# Patient Record
Sex: MALE | Race: WHITE | NOT HISPANIC OR LATINO | Employment: UNEMPLOYED | URBAN - NONMETROPOLITAN AREA
[De-identification: names, ages, dates, MRNs, and addresses within clinical notes are randomized per-mention and may not be internally consistent; named-entity substitution may affect disease eponyms.]

---

## 2017-05-19 RX ORDER — CARVEDILOL 3.12 MG/1
3.12 TABLET ORAL DAILY
Qty: 60 TABLET | Refills: 3 | Status: SHIPPED | OUTPATIENT
Start: 2017-05-19 | End: 2017-05-19 | Stop reason: SDUPTHER

## 2017-05-19 RX ORDER — CARVEDILOL 3.12 MG/1
3.12 TABLET ORAL 2 TIMES DAILY WITH MEALS
Qty: 60 TABLET | Refills: 3 | Status: SHIPPED | OUTPATIENT
Start: 2017-05-19 | End: 2017-10-04 | Stop reason: SDUPTHER

## 2017-05-19 RX ORDER — CARVEDILOL 3.12 MG/1
TABLET ORAL
Qty: 60 TABLET | Refills: 1 | OUTPATIENT
Start: 2017-05-19

## 2017-06-14 ENCOUNTER — HOSPITAL ENCOUNTER (OUTPATIENT)
Facility: HOSPITAL | Age: 42
Setting detail: OBSERVATION
Discharge: HOME | End: 2017-06-17
Attending: FAMILY MEDICINE | Admitting: FAMILY MEDICINE

## 2017-06-14 DIAGNOSIS — I25.110 CORONARY ARTERY DISEASE WITH UNSTABLE ANGINA PECTORIS, UNSPECIFIED VESSEL OR LESION TYPE, UNSPECIFIED WHETHER NATIVE OR TRANSPLANTED HEART (HCC): Primary | ICD-10-CM

## 2017-06-15 PROBLEM — I25.10 CAD (CORONARY ARTERY DISEASE): Status: ACTIVE | Noted: 2017-06-15

## 2017-06-15 PROBLEM — R07.9 CHEST PAIN: Status: ACTIVE | Noted: 2017-06-15

## 2017-06-15 LAB
ALBUMIN SERPL-MCNC: 4 G/DL (ref 3.4–4.8)
ALBUMIN/GLOB SERPL: 1.4 G/DL (ref 1.1–1.8)
ALP SERPL-CCNC: 102 U/L (ref 38–126)
ALT SERPL W P-5'-P-CCNC: 42 U/L (ref 21–72)
ANION GAP SERPL CALCULATED.3IONS-SCNC: 10 MMOL/L (ref 5–15)
AST SERPL-CCNC: 20 U/L (ref 17–59)
BILIRUB SERPL-MCNC: 0.4 MG/DL (ref 0.2–1.3)
BUN BLD-MCNC: 13 MG/DL (ref 7–21)
BUN/CREAT SERPL: 12.9 (ref 7–25)
CALCIUM SPEC-SCNC: 9.6 MG/DL (ref 8.4–10.2)
CHLORIDE SERPL-SCNC: 103 MMOL/L (ref 95–110)
CK MB SERPL-CCNC: 0.38 NG/ML (ref 0–5)
CK SERPL-CCNC: 46 U/L (ref 55–170)
CO2 SERPL-SCNC: 25 MMOL/L (ref 22–31)
CREAT BLD-MCNC: 1.01 MG/DL (ref 0.7–1.3)
DEPRECATED RDW RBC AUTO: 44.5 FL (ref 35.1–43.9)
ERYTHROCYTE [DISTWIDTH] IN BLOOD BY AUTOMATED COUNT: 14.2 % (ref 11.5–14.5)
GFR SERPL CREATININE-BSD FRML MDRD: 81 ML/MIN/1.73 (ref 60–147)
GLOBULIN UR ELPH-MCNC: 2.8 GM/DL (ref 2.3–3.5)
GLUCOSE BLD-MCNC: 110 MG/DL (ref 60–100)
HCT VFR BLD AUTO: 47.1 % (ref 39–49)
HGB BLD-MCNC: 16.5 G/DL (ref 13.7–17.3)
INR PPP: 1.02 (ref 0.8–1.2)
MCH RBC QN AUTO: 30.1 PG (ref 26.5–34)
MCHC RBC AUTO-ENTMCNC: 35 G/DL (ref 31.5–36.3)
MCV RBC AUTO: 85.8 FL (ref 80–98)
PLATELET # BLD AUTO: 205 10*3/MM3 (ref 150–450)
PMV BLD AUTO: 10.3 FL (ref 8–12)
POTASSIUM BLD-SCNC: 3.8 MMOL/L (ref 3.5–5.1)
PROT SERPL-MCNC: 6.8 G/DL (ref 6.3–8.6)
PROTHROMBIN TIME: 13.3 SECONDS (ref 11.1–15.3)
RBC # BLD AUTO: 5.49 10*6/MM3 (ref 4.37–5.74)
SODIUM BLD-SCNC: 138 MMOL/L (ref 137–145)
TROPONIN I SERPL-MCNC: 0.01 NG/ML
TROPONIN I SERPL-MCNC: 0.04 NG/ML
TROPONIN I SERPL-MCNC: 0.06 NG/ML
TROPONIN I SERPL-MCNC: 0.07 NG/ML
WBC NRBC COR # BLD: 7.04 10*3/MM3 (ref 3.2–9.8)

## 2017-06-15 PROCEDURE — G0378 HOSPITAL OBSERVATION PER HR: HCPCS

## 2017-06-15 PROCEDURE — 99225 PR SBSQ OBSERVATION CARE/DAY 25 MINUTES: CPT | Performed by: INTERNAL MEDICINE

## 2017-06-15 PROCEDURE — 85027 COMPLETE CBC AUTOMATED: CPT | Performed by: INTERNAL MEDICINE

## 2017-06-15 PROCEDURE — 82550 ASSAY OF CK (CPK): CPT | Performed by: FAMILY MEDICINE

## 2017-06-15 PROCEDURE — 93010 ELECTROCARDIOGRAM REPORT: CPT | Performed by: INTERNAL MEDICINE

## 2017-06-15 PROCEDURE — 80053 COMPREHEN METABOLIC PANEL: CPT | Performed by: INTERNAL MEDICINE

## 2017-06-15 PROCEDURE — 84484 ASSAY OF TROPONIN QUANT: CPT | Performed by: FAMILY MEDICINE

## 2017-06-15 PROCEDURE — 93005 ELECTROCARDIOGRAM TRACING: CPT | Performed by: NURSE PRACTITIONER

## 2017-06-15 PROCEDURE — 85610 PROTHROMBIN TIME: CPT | Performed by: INTERNAL MEDICINE

## 2017-06-15 PROCEDURE — 82553 CREATINE MB FRACTION: CPT | Performed by: FAMILY MEDICINE

## 2017-06-15 PROCEDURE — 84484 ASSAY OF TROPONIN QUANT: CPT | Performed by: NURSE PRACTITIONER

## 2017-06-15 RX ORDER — SODIUM CHLORIDE 0.9 % (FLUSH) 0.9 %
1-10 SYRINGE (ML) INJECTION AS NEEDED
Status: DISCONTINUED | OUTPATIENT
Start: 2017-06-16 | End: 2017-06-16 | Stop reason: HOSPADM

## 2017-06-15 RX ORDER — ONDANSETRON 2 MG/ML
4 INJECTION INTRAMUSCULAR; INTRAVENOUS EVERY 6 HOURS PRN
Status: DISCONTINUED | OUTPATIENT
Start: 2017-06-15 | End: 2017-06-17 | Stop reason: HOSPADM

## 2017-06-15 RX ORDER — PANTOPRAZOLE SODIUM 40 MG/1
40 TABLET, DELAYED RELEASE ORAL DAILY
Status: DISCONTINUED | OUTPATIENT
Start: 2017-06-15 | End: 2017-06-15 | Stop reason: SDUPTHER

## 2017-06-15 RX ORDER — NICOTINE 21 MG/24HR
1 PATCH, TRANSDERMAL 24 HOURS TRANSDERMAL EVERY 24 HOURS
Status: DISCONTINUED | OUTPATIENT
Start: 2017-06-15 | End: 2017-06-17 | Stop reason: HOSPADM

## 2017-06-15 RX ORDER — DOCUSATE SODIUM 100 MG/1
100 CAPSULE, LIQUID FILLED ORAL 2 TIMES DAILY
Status: DISCONTINUED | OUTPATIENT
Start: 2017-06-15 | End: 2017-06-17 | Stop reason: HOSPADM

## 2017-06-15 RX ORDER — ACETAMINOPHEN 325 MG/1
650 TABLET ORAL EVERY 4 HOURS PRN
Status: DISCONTINUED | OUTPATIENT
Start: 2017-06-15 | End: 2017-06-17 | Stop reason: HOSPADM

## 2017-06-15 RX ORDER — PANTOPRAZOLE SODIUM 40 MG/1
40 TABLET, DELAYED RELEASE ORAL EVERY MORNING
Status: DISCONTINUED | OUTPATIENT
Start: 2017-06-15 | End: 2017-06-17 | Stop reason: HOSPADM

## 2017-06-15 RX ORDER — ASPIRIN 81 MG/1
81 TABLET ORAL DAILY
Status: DISCONTINUED | OUTPATIENT
Start: 2017-06-15 | End: 2017-06-15

## 2017-06-15 RX ORDER — ASPIRIN 325 MG
325 TABLET, DELAYED RELEASE (ENTERIC COATED) ORAL DAILY
Status: DISCONTINUED | OUTPATIENT
Start: 2017-06-16 | End: 2017-06-16 | Stop reason: SDUPTHER

## 2017-06-15 RX ORDER — SODIUM CHLORIDE 0.9 % (FLUSH) 0.9 %
1-10 SYRINGE (ML) INJECTION AS NEEDED
Status: DISCONTINUED | OUTPATIENT
Start: 2017-06-15 | End: 2017-06-17 | Stop reason: HOSPADM

## 2017-06-15 RX ORDER — CARVEDILOL 3.12 MG/1
3.12 TABLET ORAL DAILY
Status: DISCONTINUED | OUTPATIENT
Start: 2017-06-15 | End: 2017-06-17 | Stop reason: HOSPADM

## 2017-06-15 RX ORDER — NITROGLYCERIN 0.4 MG/1
0.4 TABLET SUBLINGUAL
Status: DISCONTINUED | OUTPATIENT
Start: 2017-06-15 | End: 2017-06-17 | Stop reason: HOSPADM

## 2017-06-15 RX ORDER — SODIUM CHLORIDE 9 MG/ML
50 INJECTION, SOLUTION INTRAVENOUS CONTINUOUS
Status: DISCONTINUED | OUTPATIENT
Start: 2017-06-16 | End: 2017-06-17 | Stop reason: HOSPADM

## 2017-06-15 RX ADMIN — ASPIRIN 81 MG: 81 TABLET ORAL at 08:45

## 2017-06-15 RX ADMIN — CARVEDILOL 3.12 MG: 3.12 TABLET, FILM COATED ORAL at 08:45

## 2017-06-15 RX ADMIN — NICOTINE 1 PATCH: 21 PATCH TRANSDERMAL at 05:50

## 2017-06-15 RX ADMIN — PANTOPRAZOLE SODIUM 40 MG: 40 TABLET, DELAYED RELEASE ORAL at 05:50

## 2017-06-15 NOTE — H&P
[facility] Medicine Admission      Date of Admission: 6/14/2017      Primary Care Physician: Grabiel Olvera MD      Chief Complaint: Chest pain     HPI: Patient presents to ED with complaints of chest pain that started today. Patient states that he experiences chest pain constantly in the central portion of the chest. Worsens with exertion. States when he has CP, he describes it as a sharp sensation with radiation to the upper extremities and left side of his neck. Associated with SOA and diaphoresis. SL Nitro has some relief of pain with symptoms. He presented to ED at [facility] ED. ECG showed no new ST-T wave changes and cardiac enzymes negative with the 1st set. Due to having no cardiology at their facility he was transferred to [facility]. States having a history of CAD with stent placement in 2010.     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    Past Surgical History:  has a past surgical history that includes Cardiac catheterization; Esophagogastroduodenoscopy; Colonoscopy; and Esophagogastroduodenoscopy.    Family History: family history includes Hypertension in his other.    Social History:  reports that he has been smoking Cigarettes.  He has been smoking about 2.00 packs per day. He has never used smokeless tobacco. He reports that he does not drink  alcohol or use illicit drugs.    Allergies: No Known Allergies    Medications: Scheduled Meds:  aspirin 81 mg Oral Daily   carvedilol 3.125 mg Oral Daily   docusate sodium 100 mg Oral BID   nicotine 1 patch Transdermal Q24H   pantoprazole 40 mg Oral QAM     Continuous Infusions:   PRN Meds:.•  acetaminophen  •  nitroglycerin  •  ondansetron  •  sodium chloride    Review of Systems:  Review of Systems   Constitutional: Positive for diaphoresis and fatigue. Negative for appetite change, chills and fever.   HENT: Negative for congestion, ear pain, rhinorrhea, sneezing and sore throat.    Respiratory: Positive for shortness of breath. Negative for cough.    Cardiovascular: Positive for chest pain. Negative for palpitations and leg swelling.   Gastrointestinal: Negative for diarrhea, nausea and vomiting.   Endocrine: Negative for polyphagia and polyuria.   Musculoskeletal: Negative for back pain and neck pain.   Skin: Negative for color change and rash.   Neurological: Negative for dizziness, syncope and weakness.   Psychiatric/Behavioral: Negative for agitation, confusion and dysphoric mood.      Otherwise complete ROS is negative except as mentioned above.    Physical Exam:   Heart Rate:  [86] 86  Resp:  [20] 20  BP: (130)/(92) 130/92  Physical Exam   Constitutional: He is oriented to person, place, and time. He appears well-developed and well-nourished.   Cardiovascular: Normal rate, regular rhythm and normal heart sounds.  Exam reveals no gallop and no friction rub.    No murmur heard.  Pulmonary/Chest: Effort normal and breath sounds normal. No respiratory distress. He has no wheezes. He has no rales.   Abdominal: Soft. Bowel sounds are normal. There is no tenderness.   Musculoskeletal: Normal range of motion. He exhibits no edema.   Neurological: He is alert and oriented to person, place, and time.   Skin: Skin is warm and dry.   Psychiatric: He has a normal mood and affect. His behavior is normal.   Vitals  reviewed.        Results Reviewed:  I have personally reviewed current lab, radiology, and data and agree with results.  Lab Results (last 24 hours)     ** No results found for the last 24 hours. **        Imaging Results (last 24 hours)     ** No results found for the last 24 hours. **                          Assessment/Plan:   1) Chest pain R/O ACS - Trend 3 sets of cardiac enzymes, monitor on telemetry. ASA, SL Nitro as needed, continue Coreg. Cardiology consultation.  2) Tobacco dependence syndrome - Will provide patient with a nicotine patch  3) DVT PPx - SCDs/TEDs           Jalen Mae MD  06/15/17  1:07 AM

## 2017-06-15 NOTE — PLAN OF CARE
Problem: Patient Care Overview (Adult)  Goal: Plan of Care Review  Outcome: Ongoing (interventions implemented as appropriate)    06/15/17 1872   Coping/Psychosocial Response Interventions   Plan Of Care Reviewed With patient   Patient Care Overview   Progress improving   Outcome Evaluation   Outcome Summary/Follow up Plan pt c/o slight chest discomfort today, troponins drawn and has gradually increased. FLORI Sandoval is aware. consult in to cardiology. v/s stable. will continue to monitor.        Goal: Adult Individualization and Mutuality  Outcome: Ongoing (interventions implemented as appropriate)  Goal: Discharge Needs Assessment  Outcome: Ongoing (interventions implemented as appropriate)    Problem: Acute Coronary Syndrome (ACS) (Adult)  Goal: Signs and Symptoms of Listed Potential Problems Will be Absent or Manageable (Acute Coronary Syndrome)  Outcome: Ongoing (interventions implemented as appropriate)

## 2017-06-15 NOTE — PLAN OF CARE
Problem: Patient Care Overview (Adult)  Goal: Plan of Care Review  Outcome: Ongoing (interventions implemented as appropriate)    06/15/17 0527   Coping/Psychosocial Response Interventions   Plan Of Care Reviewed With patient   Patient Care Overview   Progress improving   Outcome Evaluation   Outcome Summary/Follow up Plan No c/o chest pain, lab work WNL, VSS stable.        Goal: Adult Individualization and Mutuality  Outcome: Ongoing (interventions implemented as appropriate)  Goal: Discharge Needs Assessment  Outcome: Ongoing (interventions implemented as appropriate)    Problem: Acute Coronary Syndrome (ACS) (Adult)  Goal: Signs and Symptoms of Listed Potential Problems Will be Absent or Manageable (Acute Coronary Syndrome)  Outcome: Ongoing (interventions implemented as appropriate)

## 2017-06-15 NOTE — PROGRESS NOTES
[facility] Medicine Services  INPATIENT PROGRESS NOTE    Length of Stay: 0  Date of Admission: 6/14/2017  Primary Care Physician: Grabiel Olvera MD    Subjective   Chief Complaint: Chest pain  HPI:  41 year old male with history of CAD s/p LAD stent in 2010 presents with symptoms of similar quality and quantity as that time.  Troponin is trace elevated.  He is currently pain free.      Review of Systems   Constitutional: Negative for chills and fever.   Respiratory: Negative for shortness of breath and wheezing.    Cardiovascular: Negative for chest pain (currenlty chest pain free) and palpitations.   Gastrointestinal: Negative for abdominal pain, nausea and vomiting.      All pertinent negatives and positives are as above. All other systems have been reviewed and are negative unless otherwise stated.     Objective    Temp:  [97 °F (36.1 °C)-98.8 °F (37.1 °C)] 97.7 °F (36.5 °C)  Heart Rate:  [65-86] 70  Resp:  [20] 20  BP: (110-130)/(62-92) 127/70    Physical Exam   Constitutional: He is oriented to person, place, and time. He appears well-developed and well-nourished.   HENT:   Head: Normocephalic and atraumatic.   Cardiovascular: Normal rate, regular rhythm, normal heart sounds and intact distal pulses.    Pulmonary/Chest: Effort normal and breath sounds normal. No respiratory distress. He has no wheezes.   Abdominal: Soft. Bowel sounds are normal. He exhibits no distension. There is no tenderness.   Musculoskeletal: Normal range of motion. He exhibits no edema.   Neurological: He is alert and oriented to person, place, and time.   Skin: Skin is warm and dry. No erythema.   Vitals reviewed.    Results Review:  I have reviewed the labs, radiology results, and diagnostic studies.    Laboratory Data:         Invalid input(s): LABALBU, PROT  CrCl cannot be calculated (Patient's most recent sCr result is older than the maximum 30 days allowed.).                    Culture  Data:   No results found for: BLOODCX  No results found for: URINECX  No results found for: RESPCX  No results found for: WOUNDCX  No results found for: STOOLCX  No components found for: BODYFLD    Radiology Data:   Imaging Results (last 24 hours)     ** No results found for the last 24 hours. **          I have reviewed the patient current medications.     Assessment/Plan     Hospital Problem List     Chest pain    CAD (coronary artery disease)          Plan:   Trend troponin  Aspirin  Beta blocker  Cardiology consult        This document has been electronically signed by FLORI Dickinson on Char 15, 2017 3:59 PM    I personally evaluated and examined the patient in conjunction with PRIMO Reyes and agree with the assessment, treatment plan, and disposition of the patient as recorded.   Lungs are clear

## 2017-06-16 LAB
ANION GAP SERPL CALCULATED.3IONS-SCNC: 8 MMOL/L (ref 5–15)
BASOPHILS # BLD AUTO: 0.07 10*3/MM3 (ref 0–0.2)
BASOPHILS NFR BLD AUTO: 1.1 % (ref 0–2)
BUN BLD-MCNC: 14 MG/DL (ref 7–21)
BUN/CREAT SERPL: 13.3 (ref 7–25)
CALCIUM SPEC-SCNC: 8.9 MG/DL (ref 8.4–10.2)
CHLORIDE SERPL-SCNC: 105 MMOL/L (ref 95–110)
CO2 SERPL-SCNC: 26 MMOL/L (ref 22–31)
CREAT BLD-MCNC: 1.05 MG/DL (ref 0.7–1.3)
DEPRECATED RDW RBC AUTO: 45.5 FL (ref 35.1–43.9)
EOSINOPHIL # BLD AUTO: 0.27 10*3/MM3 (ref 0–0.7)
EOSINOPHIL NFR BLD AUTO: 4.1 % (ref 0–7)
ERYTHROCYTE [DISTWIDTH] IN BLOOD BY AUTOMATED COUNT: 14.4 % (ref 11.5–14.5)
GFR SERPL CREATININE-BSD FRML MDRD: 77 ML/MIN/1.73 (ref 60–147)
GLUCOSE BLD-MCNC: 98 MG/DL (ref 60–100)
HCT VFR BLD AUTO: 47.9 % (ref 39–49)
HGB BLD-MCNC: 16.4 G/DL (ref 13.7–17.3)
IMM GRANULOCYTES # BLD: 0.02 10*3/MM3 (ref 0–0.02)
IMM GRANULOCYTES NFR BLD: 0.3 % (ref 0–0.5)
LYMPHOCYTES # BLD AUTO: 2.94 10*3/MM3 (ref 0.6–4.2)
LYMPHOCYTES NFR BLD AUTO: 44.4 % (ref 10–50)
MCH RBC QN AUTO: 29.5 PG (ref 26.5–34)
MCHC RBC AUTO-ENTMCNC: 34.2 G/DL (ref 31.5–36.3)
MCV RBC AUTO: 86.3 FL (ref 80–98)
MONOCYTES # BLD AUTO: 0.54 10*3/MM3 (ref 0–0.9)
MONOCYTES NFR BLD AUTO: 8.2 % (ref 0–12)
NEUTROPHILS # BLD AUTO: 2.78 10*3/MM3 (ref 2–8.6)
NEUTROPHILS NFR BLD AUTO: 41.9 % (ref 37–80)
PLATELET # BLD AUTO: 188 10*3/MM3 (ref 150–450)
PMV BLD AUTO: 10.5 FL (ref 8–12)
POTASSIUM BLD-SCNC: 4.1 MMOL/L (ref 3.5–5.1)
RBC # BLD AUTO: 5.55 10*6/MM3 (ref 4.37–5.74)
SODIUM BLD-SCNC: 139 MMOL/L (ref 137–145)
WBC NRBC COR # BLD: 6.62 10*3/MM3 (ref 3.2–9.8)

## 2017-06-16 PROCEDURE — 0 IOPAMIDOL PER 1 ML: Performed by: INTERNAL MEDICINE

## 2017-06-16 PROCEDURE — C1887 CATHETER, GUIDING: HCPCS | Performed by: INTERNAL MEDICINE

## 2017-06-16 PROCEDURE — 25010000002 PROTAMINE SULFATE PER 10 MG: Performed by: INTERNAL MEDICINE

## 2017-06-16 PROCEDURE — C1769 GUIDE WIRE: HCPCS | Performed by: INTERNAL MEDICINE

## 2017-06-16 PROCEDURE — 99152 MOD SED SAME PHYS/QHP 5/>YRS: CPT | Performed by: INTERNAL MEDICINE

## 2017-06-16 PROCEDURE — 80048 BASIC METABOLIC PNL TOTAL CA: CPT | Performed by: FAMILY MEDICINE

## 2017-06-16 PROCEDURE — 99153 MOD SED SAME PHYS/QHP EA: CPT | Performed by: INTERNAL MEDICINE

## 2017-06-16 PROCEDURE — 25010000002 FENTANYL CITRATE (PF) 100 MCG/2ML SOLUTION: Performed by: INTERNAL MEDICINE

## 2017-06-16 PROCEDURE — C9600 PERC DRUG-EL COR STENT SING: HCPCS | Performed by: INTERNAL MEDICINE

## 2017-06-16 PROCEDURE — G0378 HOSPITAL OBSERVATION PER HR: HCPCS

## 2017-06-16 PROCEDURE — C1725 CATH, TRANSLUMIN NON-LASER: HCPCS | Performed by: INTERNAL MEDICINE

## 2017-06-16 PROCEDURE — C1874 STENT, COATED/COV W/DEL SYS: HCPCS | Performed by: INTERNAL MEDICINE

## 2017-06-16 PROCEDURE — 93454 CORONARY ARTERY ANGIO S&I: CPT | Performed by: INTERNAL MEDICINE

## 2017-06-16 PROCEDURE — 94799 UNLISTED PULMONARY SVC/PX: CPT

## 2017-06-16 PROCEDURE — 25010000002 HEPARIN (PORCINE) PER 1000 UNITS: Performed by: INTERNAL MEDICINE

## 2017-06-16 PROCEDURE — 92928 PRQ TCAT PLMT NTRAC ST 1 LES: CPT | Performed by: INTERNAL MEDICINE

## 2017-06-16 PROCEDURE — 25010000002 MIDAZOLAM PER 1 MG: Performed by: INTERNAL MEDICINE

## 2017-06-16 PROCEDURE — C1894 INTRO/SHEATH, NON-LASER: HCPCS | Performed by: INTERNAL MEDICINE

## 2017-06-16 PROCEDURE — 85025 COMPLETE CBC W/AUTO DIFF WBC: CPT | Performed by: FAMILY MEDICINE

## 2017-06-16 DEVICE — XIENCE ALPINE EVEROLIMUS ELUTING CORONARY STENT SYSTEM 3.50 MM X 23 MM / RAPID-EXCHANGE
Type: IMPLANTABLE DEVICE | Status: FUNCTIONAL
Brand: XIENCE ALPINE

## 2017-06-16 RX ORDER — MIDAZOLAM HYDROCHLORIDE 1 MG/ML
INJECTION INTRAMUSCULAR; INTRAVENOUS AS NEEDED
Status: DISCONTINUED | OUTPATIENT
Start: 2017-06-16 | End: 2017-06-16 | Stop reason: HOSPADM

## 2017-06-16 RX ORDER — HEPARIN SODIUM 1000 [USP'U]/ML
INJECTION, SOLUTION INTRAVENOUS; SUBCUTANEOUS AS NEEDED
Status: DISCONTINUED | OUTPATIENT
Start: 2017-06-16 | End: 2017-06-16 | Stop reason: HOSPADM

## 2017-06-16 RX ORDER — NITROGLYCERIN 5 MG/ML
INJECTION, SOLUTION INTRAVENOUS AS NEEDED
Status: DISCONTINUED | OUTPATIENT
Start: 2017-06-16 | End: 2017-06-16 | Stop reason: HOSPADM

## 2017-06-16 RX ORDER — LIDOCAINE HYDROCHLORIDE 20 MG/ML
INJECTION, SOLUTION INFILTRATION; PERINEURAL AS NEEDED
Status: DISCONTINUED | OUTPATIENT
Start: 2017-06-16 | End: 2017-06-16 | Stop reason: HOSPADM

## 2017-06-16 RX ORDER — PROTAMINE SULFATE 10 MG/ML
INJECTION, SOLUTION INTRAVENOUS AS NEEDED
Status: DISCONTINUED | OUTPATIENT
Start: 2017-06-16 | End: 2017-06-16 | Stop reason: HOSPADM

## 2017-06-16 RX ORDER — HYDROCODONE BITARTRATE AND ACETAMINOPHEN 5; 325 MG/1; MG/1
1 TABLET ORAL EVERY 6 HOURS PRN
Status: DISCONTINUED | OUTPATIENT
Start: 2017-06-16 | End: 2017-06-17 | Stop reason: HOSPADM

## 2017-06-16 RX ORDER — SODIUM CHLORIDE 9 MG/ML
125 INJECTION, SOLUTION INTRAVENOUS CONTINUOUS
Status: DISCONTINUED | OUTPATIENT
Start: 2017-06-16 | End: 2017-06-17 | Stop reason: HOSPADM

## 2017-06-16 RX ORDER — ASPIRIN 81 MG/1
81 TABLET, CHEWABLE ORAL DAILY
Status: DISCONTINUED | OUTPATIENT
Start: 2017-06-17 | End: 2017-06-17 | Stop reason: HOSPADM

## 2017-06-16 RX ORDER — FENTANYL CITRATE 50 UG/ML
INJECTION, SOLUTION INTRAMUSCULAR; INTRAVENOUS AS NEEDED
Status: DISCONTINUED | OUTPATIENT
Start: 2017-06-16 | End: 2017-06-16 | Stop reason: HOSPADM

## 2017-06-16 RX ADMIN — CARVEDILOL 3.12 MG: 3.12 TABLET, FILM COATED ORAL at 09:45

## 2017-06-16 RX ADMIN — ASPIRIN 325 MG: 325 TABLET ORAL at 09:45

## 2017-06-16 RX ADMIN — Medication 3 ML: at 09:42

## 2017-06-16 RX ADMIN — SODIUM CHLORIDE 50 ML/HR: 9 INJECTION, SOLUTION INTRAVENOUS at 09:42

## 2017-06-16 RX ADMIN — ACETAMINOPHEN 650 MG: 325 TABLET ORAL at 17:35

## 2017-06-16 RX ADMIN — SODIUM CHLORIDE 125 ML/HR: 9 INJECTION, SOLUTION INTRAVENOUS at 19:54

## 2017-06-16 NOTE — PLAN OF CARE
Problem: Patient Care Overview (Adult)  Goal: Plan of Care Review  Outcome: Ongoing (interventions implemented as appropriate)    06/15/17 1552 06/15/17 2100 06/16/17 0416   Coping/Psychosocial Response Interventions   Plan Of Care Reviewed With --  patient;spouse --    Patient Care Overview   Progress improving --  --    Outcome Evaluation   Outcome Summary/Follow up Plan --  --  No complaints of chest pain this shift, VSS. Dr. Tineo talked to pt about doing heart cath this afternoon. Cath to be done this afternoon.        Goal: Adult Individualization and Mutuality  Outcome: Ongoing (interventions implemented as appropriate)  Goal: Discharge Needs Assessment  Outcome: Ongoing (interventions implemented as appropriate)    Problem: Acute Coronary Syndrome (ACS) (Adult)  Goal: Signs and Symptoms of Listed Potential Problems Will be Absent or Manageable (Acute Coronary Syndrome)  Outcome: Ongoing (interventions implemented as appropriate)

## 2017-06-16 NOTE — CONSULTS
Patient Name: Kristian Chandra  Age/Sex: 41 y.o. male  : 1975  MRN: [id number]    Date of Hospital Visit: 2017  Encounter Provider: Melissa Tineo MD  Referring Provider: Jalen Mae MD         Subjective:       Chief Complaint: Unstable angina    History of Present Illness:  Kristian Chandra is a 41 y.o. male presents to the hospital with chief complaint of chest discomfort.  Patient stated that he started to experience chest discomfort in his anterior precordium radiating to his left shoulder and left upper extremity.  He stated that his arm gets heavy and numb.  He also gets extreme fatigue and lack of energy with minor exertion.  He said his symptoms would last for approximately 15 minutes of resting.  Patient takes nitroglycerin which helped with his chest discomfort.  Patient stated that his symptoms were very similar to what he had when he had his myocardial infarction in .    Last Echo: May 15, 2012 left ventricular systolic function was within normal limits with estimated left ventricular systolic ejection fraction of 65%.  Mild concentric left ventricular hypertrophy was also noted.  Diastolic function was within normal limits.    Last Cath: 2011 99% stenosis in the mid LAD was discovered.  2.5 x 13 mm Cypher stent was initially deployed at 12 theo.  Subsequently due to wasting the stent 2.5 x 10 mm balloon was used for postdilatation.  According to the report the patient had no residual disease.      Past Medical History:  Past Medical History:   Diagnosis Date   • Abnormal liver function tests     elevated   • Acute bronchitis    • Asthma, allergic    • Blastomycosis    • Chronic obstructive lung disease    • Chronic persistent hepatitis    • Coronary arteriosclerosis     post stent    • Diarrhea    • Dyspnea on exertion    • Epigastric pain    • Essential hypertension    • Generalized abdominal pain    • GERD (gastroesophageal reflux disease)     with  esophagitis   • H/O echocardiogram     Normal left ventricular systolic function with EF of 65%. Concentric left ventricular hypertrophy. 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 Postdilated using a noncompliant balloon [id number] balloon 04/17/2011       • COLONOSCOPY      External and internal hemorrhoids.Medium sized lipoma at the ileocecal valve. Biopsied.Several biopsies obtained in the entire colon and in the terminal ileum. 07/08/2016       • ENDOSCOPY      Internal & external hemorrhoids found. 12/21/2011     • ENDOSCOPY      Mildly severe esophagitis. Gastritis. Normal duodenum. Biopsied. Several biopsies obtained in the lower third of the esophagus.Several biopsies obtained in the gastric antrum. 07/08/2016           Home Medications:    Prescriptions Prior to Admission   Medication Sig Dispense Refill Last Dose   • aspirin 81 MG EC tablet Take 81 mg by mouth Daily.   6/15/2017 at Unknown time   • carvedilol (COREG) 3.125 MG tablet Take 1 tablet by mouth 2 (Two) Times a Day With Meals. (Patient taking differently: Take 3.125 mg by mouth Daily.) 60 tablet 3 6/15/2017 at Unknown time   • esomeprazole (nexIUM) 20 MG capsule Take 20 mg by mouth Every Morning Before Breakfast.   6/15/2017   • Loratadine 10 MG capsule Take  by mouth daily.   6/15/2017 at Unknown time   • pantoprazole (PROTONIX) 40 MG EC tablet Take 1 tablet by mouth daily. 30 tablet 3        Allergies:  No Known Allergies    Past Social History:  Social History     Social History   • Marital  status:      Spouse name: N/A   • Number of children: N/A   • Years of education: N/A     Social History Main Topics   • Smoking status: Current Every Day Smoker     Packs/day: 2.00     Types: Cigarettes   • Smokeless tobacco: Never Used   • Alcohol use No   • Drug use: No   • Sexual activity: Not on file     Other Topics Concern   • Not on file     Social History Narrative       Past Family History:      Review of Systems:   Constitution: No chills, no rigors, no unexplained weight loss or weight gain  Eyes:  No diplopia, no blurred vision, no loss of vision, conjunctiva is pink and sclera is anicteric  ENT:  No tinnitus, no otorrhea, no epistaxis, no sore throat   Respiratory: No cough, no hemoptysis  Cardiovascular: Chest pain, dyspnea on minimal exertion.  Fatigue on minimal exertion  Gastrointestinal: No nausea, no vomiting, no hematemesis, no diarrhea or constipation, no melena  Genitourinary: No frequency of dysuria no hematuria  Integument: positive for  No pruritis and  no skin rash  Hematologic / Lymphatic: No excessive bleeding, easy bruising, fatigue, lymphadenopathy and petechiae  Musculoskeletal: No joint pain, joint stiffness, joint swelling, muscle pain, muscle weakness and neck pain  Neurological: No dizziness, headaches, light headedness, seizures and vertigo  Behavioral/Psych: No depression, homicidal ideations and suicidal ideations  Endocrine: No frequent urination and nocturia, temperature intolerance, weight gain, unintended and weight loss, unintended      Objective:     Objective:  Vital Signs (last 24 hours)       06/14 0700  -  06/15 0659 06/15 0700  -  06/15 2038   Most Recent    Temp (°F) 97.3 -  98.8    97 -  97.7     97.6 (36.4)    Heart Rate 80 -  86    65 -  70     67    Resp   20    18 -  20     18    /62 -  130/92    110/68 -  127/70     126/79    SpO2 (%) 96 -  97    96 -  98     97          There is no height or weight on file to calculate BMI.  Weight change:            Physical Exam:   General Appearance:    Alert, oriented, cooperative, in no acute distress   Head:    Normocephalic, atraumatic, without obvious abnormality   Eyes:            Lids and lashes normal, conjunctivae and sclerae normal, no   icterus, no pallor   Ears:    Ears appear intact with no abnormalities noted   Throat:   Mucous membranes pink and moist   Neck:   Supple, trachea midline, no carotid bruit, no JVD   Lungs:     Clear to auscultation, respirations regular, even and                   Unlabored. No wheezes, rales, rhonchi    Heart:    Regular rhythm and normal rate, normal S1 and S2, no            murmur, no gallop, no rub, no click   Abdomen:     Normal bowel sounds, no masses, liver and spleen nonpalpable, soft, non-tender, non-distended, no guarding, no rebound tenderness   Genitalia:    Deferred   Extremities:   Moves all extremities well, no edema, no cyanosis, no              Redness, no clubbing   Pulses:   Pulses palpable and equal bilaterally   Skin:   No bleeding, bruising or rash   Neurologic:   Alert and oriented to person, place, and time. No focal neurological deficits       Lab Review:           Invalid input(s): LABALBU, PROT    Results from last 7 days  Lab Units 06/15/17  1558 06/15/17  1247 06/15/17  0644 06/15/17  0136   CK TOTAL U/L  --   --   --  46*   TROPONIN I ng/mL 0.063* 0.066* 0.035* 0.012                               Invalid input(s):  T4,  FREET4    EKG:   ECG/EMG Results (last 24 hours)     Procedure Component Value Units Date/Time    SCANNED EKG [[id number]] Resulted:  06/14/17      Updated:  06/15/17 0951    SCANNED EKG [[id number]] Resulted:  06/14/17      Updated:  06/15/17 0958    ECG 12 Lead [[id number]] Collected:  06/15/17 1012     Updated:  06/15/17 1507    Narrative:       Test Reason : chest pain  Blood Pressure : **/** mmHG  Vent. Rate : 067 BPM     Atrial Rate : 067 BPM     P-R Int : 136 ms          QRS Dur : 100 ms      QT Int : 408 ms       P-R-T  Axes : 043 028 042 degrees     QTc Int : 431 ms    Normal sinus rhythm  Normal ECG  When compared with ECG of 30-MAR-2015 09:01,  No significant change was found  Confirmed by ALDAIR HOFFMAN, LIBORIO ([id number]),  MIKE GONZALEZ ([id number]) on  6/15/2017 3:07:38 PM    Referred By:             Confirmed By:LIBORIO QUINTEROS MD    SCANNED EKG [[id number]] Resulted:  06/14/17      Updated:  06/15/17 2029          Imaging:  Imaging Results (last 24 hours)     ** No results found for the last 24 hours. **          I personally viewed and interpreted the patient's EKG/Telemetry data.    Assessment:     Active Problems:    1.  Unstable angina with mildly elevated cardiac enzymes    2.  Coronary artery disease    3.  Chronic tobacco abuse      Plan:   · I have counseled the patient for over 10 minutes regarding tobacco smoking cessation and risk factor modification.  Patient has verbalized understanding.  She has not given any comments in regards to his plans are.  · I have advised patient to consider diagnostic coronary angiogram.  Risks and benefits and all potential complications of diagnostic angiogram was discussed with the patient.  Patient has verbalized understanding.  All his questions were answered.   has given me permission to put him on the schedule for diagnostic coronary angiogram and possible PCI.  We have also discussed potential need for open heart surgery depending on severity and location of the lesions.  · We will continue with current medications.  We will keep patient nothing by mouth after breakfast.              This document has been electronically signed by Melissa Tineo MD on Char 15, 2017 8:51 PM     Melissa Tineo MD  06/15/17  8:38 PM

## 2017-06-16 NOTE — PROGRESS NOTES
[facility] Medicine Services  INPATIENT PROGRESS NOTE    Length of Stay: 0  Date of Admission: 6/14/2017  Primary Care Physician: Grabiel Olvera MD    Subjective   Chief Complaint: Chest pain  HPI:  41 year old male with history of CAD s/p LAD stent in 2010 presents with symptoms of similar quality and quantity as that time.  He was evaluated by cardiology and was recommended coronary angiogram.    Review of Systems   Constitutional: Negative for chills and fever.   Respiratory: Negative for shortness of breath and wheezing.    Cardiovascular: Negative for chest pain (currenlty chest pain free) and palpitations.   Gastrointestinal: Negative for abdominal pain, nausea and vomiting.      All pertinent negatives and positives are as above. All other systems have been reviewed and are negative unless otherwise stated.     Objective    Temp:  [97.4 °F (36.3 °C)-98.4 °F (36.9 °C)] 97.8 °F (36.6 °C)  Heart Rate:  [53-75] 62  Resp:  [16-18] 18  BP: ()/(56-87) 118/78    Physical Exam   Constitutional: He is oriented to person, place, and time. He appears well-developed and well-nourished.   HENT:   Head: Normocephalic and atraumatic.   Cardiovascular: Normal rate, regular rhythm, normal heart sounds and intact distal pulses.    Pulmonary/Chest: Effort normal and breath sounds normal. No respiratory distress. He has no wheezes.   Abdominal: Soft. Bowel sounds are normal. He exhibits no distension. There is no tenderness.   Musculoskeletal: Normal range of motion. He exhibits no edema.   Neurological: He is alert and oriented to person, place, and time.   Skin: Skin is warm and dry. No erythema.   Vitals reviewed.    Results Review:  I have reviewed the labs, radiology results, and diagnostic studies.    Laboratory Data:     Results from last 7 days  Lab Units 06/16/17  0535 06/15/17  2150   SODIUM mmol/L 139 138   POTASSIUM mmol/L 4.1 3.8   CHLORIDE mmol/L 105 103   TOTAL CO2  mmol/L 26.0 25.0   BUN mg/dL 14 13   CREATININE mg/dL 1.05 1.01   GLUCOSE mg/dL 98 110*   CALCIUM mg/dL 8.9 9.6   BILIRUBIN mg/dL  --  0.4   ALK PHOS U/L  --  102   ALT (SGPT) U/L  --  42   AST (SGOT) U/L  --  20   ANION GAP mmol/L 8.0 10.0     CrCl cannot be calculated (Unknown ideal weight.).            Results from last 7 days  Lab Units 06/16/17  0535 06/15/17  2150   WBC 10*3/mm3 6.62 7.04   HEMOGLOBIN g/dL 16.4 16.5   HEMATOCRIT % 47.9 47.1   PLATELETS 10*3/mm3 188 205       Results from last 7 days  Lab Units 06/15/17  2150   INR  1.02       Culture Data:   No results found for: BLOODCX  No results found for: URINECX  No results found for: RESPCX  No results found for: WOUNDCX  No results found for: STOOLCX  No components found for: BODYFLD    Radiology Data:   Imaging Results (last 24 hours)     ** No results found for the last 24 hours. **          I have reviewed the patient current medications.     Assessment/Plan     Hospital Problem List     Chest pain    CAD (coronary artery disease)          Plan:   Heart cath today  ASA  Coreg  Smoking cessation discussed        This document has been electronically signed by FLORI Dickinson on June 16, 2017 3:17 PM

## 2017-06-16 NOTE — PLAN OF CARE
Problem: Patient Care Overview (Adult)  Goal: Plan of Care Review  Outcome: Ongoing (interventions implemented as appropriate)    06/16/17 3045   Coping/Psychosocial Response Interventions   Plan Of Care Reviewed With patient   Patient Care Overview   Progress improving   Outcome Evaluation   Outcome Summary/Follow up Plan Pt returned from cath wtih TR band. TR band deflated. 1 stent to LAD.        Goal: Adult Individualization and Mutuality  Outcome: Ongoing (interventions implemented as appropriate)    Problem: Acute Coronary Syndrome (ACS) (Adult)  Goal: Signs and Symptoms of Listed Potential Problems Will be Absent or Manageable (Acute Coronary Syndrome)  Outcome: Ongoing (interventions implemented as appropriate)

## 2017-06-17 VITALS
SYSTOLIC BLOOD PRESSURE: 154 MMHG | HEART RATE: 60 BPM | WEIGHT: 225.1 LBS | RESPIRATION RATE: 18 BRPM | HEIGHT: 70 IN | BODY MASS INDEX: 32.22 KG/M2 | DIASTOLIC BLOOD PRESSURE: 75 MMHG | TEMPERATURE: 97.7 F | OXYGEN SATURATION: 97 %

## 2017-06-17 PROBLEM — I20.0 UNSTABLE ANGINA (HCC): Status: ACTIVE | Noted: 2017-06-17

## 2017-06-17 PROBLEM — F17.200 TOBACCO DEPENDENCE: Status: ACTIVE | Noted: 2017-06-17

## 2017-06-17 PROBLEM — E78.5 DYSLIPIDEMIA: Status: ACTIVE | Noted: 2017-06-17

## 2017-06-17 LAB
ANION GAP SERPL CALCULATED.3IONS-SCNC: 9 MMOL/L (ref 5–15)
ARTICHOKE IGE QN: 113 MG/DL (ref 1–129)
BASOPHILS # BLD AUTO: 0.05 10*3/MM3 (ref 0–0.2)
BASOPHILS NFR BLD AUTO: 0.7 % (ref 0–2)
BUN BLD-MCNC: 14 MG/DL (ref 7–21)
BUN/CREAT SERPL: 15.1 (ref 7–25)
CALCIUM SPEC-SCNC: 8.1 MG/DL (ref 8.4–10.2)
CHLORIDE SERPL-SCNC: 110 MMOL/L (ref 95–110)
CHOLEST SERPL-MCNC: 152 MG/DL (ref 0–199)
CO2 SERPL-SCNC: 21 MMOL/L (ref 22–31)
CREAT BLD-MCNC: 0.93 MG/DL (ref 0.7–1.3)
DEPRECATED RDW RBC AUTO: 44.3 FL (ref 35.1–43.9)
EOSINOPHIL # BLD AUTO: 0.22 10*3/MM3 (ref 0–0.7)
EOSINOPHIL NFR BLD AUTO: 2.9 % (ref 0–7)
ERYTHROCYTE [DISTWIDTH] IN BLOOD BY AUTOMATED COUNT: 14.2 % (ref 11.5–14.5)
GFR SERPL CREATININE-BSD FRML MDRD: 89 ML/MIN/1.73 (ref 63–147)
GLUCOSE BLD-MCNC: 91 MG/DL (ref 60–100)
HBA1C MFR BLD: 5.73 % (ref 4–5.6)
HCT VFR BLD AUTO: 43.8 % (ref 39–49)
HDLC SERPL-MCNC: 23 MG/DL (ref 60–200)
HGB BLD-MCNC: 15.2 G/DL (ref 13.7–17.3)
IMM GRANULOCYTES # BLD: 0.02 10*3/MM3 (ref 0–0.02)
IMM GRANULOCYTES NFR BLD: 0.3 % (ref 0–0.5)
LDLC/HDLC SERPL: 3.34 {RATIO} (ref 0–3.55)
LYMPHOCYTES # BLD AUTO: 2.79 10*3/MM3 (ref 0.6–4.2)
LYMPHOCYTES NFR BLD AUTO: 37.2 % (ref 10–50)
MCH RBC QN AUTO: 29.6 PG (ref 26.5–34)
MCHC RBC AUTO-ENTMCNC: 34.7 G/DL (ref 31.5–36.3)
MCV RBC AUTO: 85.2 FL (ref 80–98)
MONOCYTES # BLD AUTO: 0.47 10*3/MM3 (ref 0–0.9)
MONOCYTES NFR BLD AUTO: 6.3 % (ref 0–12)
NEUTROPHILS # BLD AUTO: 3.96 10*3/MM3 (ref 2–8.6)
NEUTROPHILS NFR BLD AUTO: 52.6 % (ref 37–80)
PLATELET # BLD AUTO: 183 10*3/MM3 (ref 150–450)
PMV BLD AUTO: 10.1 FL (ref 8–12)
POTASSIUM BLD-SCNC: 3.8 MMOL/L (ref 3.5–5.1)
RBC # BLD AUTO: 5.14 10*6/MM3 (ref 4.37–5.74)
SODIUM BLD-SCNC: 140 MMOL/L (ref 137–145)
TRIGL SERPL-MCNC: 261 MG/DL (ref 20–199)
WBC NRBC COR # BLD: 7.51 10*3/MM3 (ref 3.2–9.8)

## 2017-06-17 PROCEDURE — G0378 HOSPITAL OBSERVATION PER HR: HCPCS

## 2017-06-17 PROCEDURE — 94799 UNLISTED PULMONARY SVC/PX: CPT

## 2017-06-17 PROCEDURE — 83036 HEMOGLOBIN GLYCOSYLATED A1C: CPT | Performed by: INTERNAL MEDICINE

## 2017-06-17 PROCEDURE — 80048 BASIC METABOLIC PNL TOTAL CA: CPT | Performed by: FAMILY MEDICINE

## 2017-06-17 PROCEDURE — 99225 PR SBSQ OBSERVATION CARE/DAY 25 MINUTES: CPT | Performed by: INTERNAL MEDICINE

## 2017-06-17 PROCEDURE — 80061 LIPID PANEL: CPT | Performed by: INTERNAL MEDICINE

## 2017-06-17 PROCEDURE — 85025 COMPLETE CBC W/AUTO DIFF WBC: CPT | Performed by: FAMILY MEDICINE

## 2017-06-17 RX ORDER — ROSUVASTATIN CALCIUM 20 MG/1
20 TABLET, COATED ORAL NIGHTLY
Qty: 30 TABLET | Refills: 0 | Status: SHIPPED | OUTPATIENT
Start: 2017-06-17 | End: 2017-06-17 | Stop reason: HOSPADM

## 2017-06-17 RX ORDER — ROSUVASTATIN CALCIUM 20 MG/1
20 TABLET, COATED ORAL NIGHTLY
Status: DISCONTINUED | OUTPATIENT
Start: 2017-06-17 | End: 2017-06-17 | Stop reason: HOSPADM

## 2017-06-17 RX ORDER — PRAVASTATIN SODIUM 40 MG
40 TABLET ORAL DAILY
Qty: 30 TABLET | Refills: 0 | Status: SHIPPED | OUTPATIENT
Start: 2017-06-17 | End: 2017-07-17 | Stop reason: SDUPTHER

## 2017-06-17 RX ORDER — NITROGLYCERIN 0.4 MG/1
0.4 TABLET SUBLINGUAL
Qty: 30 TABLET | Refills: 0 | Status: SHIPPED | OUTPATIENT
Start: 2017-06-17

## 2017-06-17 RX ADMIN — PANTOPRAZOLE SODIUM 40 MG: 40 TABLET, DELAYED RELEASE ORAL at 09:00

## 2017-06-17 RX ADMIN — SODIUM CHLORIDE 125 ML/HR: 9 INJECTION, SOLUTION INTRAVENOUS at 05:39

## 2017-06-17 RX ADMIN — CARVEDILOL 3.12 MG: 3.12 TABLET, FILM COATED ORAL at 09:04

## 2017-06-17 RX ADMIN — HYDROCODONE BITARTRATE AND ACETAMINOPHEN 1 TABLET: 5; 325 TABLET ORAL at 09:01

## 2017-06-17 RX ADMIN — ASPIRIN 81 MG 81 MG: 81 TABLET ORAL at 09:01

## 2017-06-17 RX ADMIN — TICAGRELOR 90 MG: 90 TABLET ORAL at 09:07

## 2017-06-17 NOTE — DISCHARGE SUMMARY
[facility] Medicine Services  DISCHARGE SUMMARY       Date of Admission: 6/14/2017  Date of Discharge:  6/17/2017  Primary Care Physician: Grabiel Olvera MD    Presenting Problem/History of Present Illness:  Chest pain [R07.9]  Coronary artery disease with unstable angina pectoris, unspecified vessel or lesion type, unspecified whether native or transplanted heart [I25.110]     Final Discharge Diagnoses:  Principal Problem:    Unstable angina  Active Problems:    Chest pain    CAD (coronary artery disease)    Tobacco dependence    Dyslipidemia      Consults:   Consults     Date and Time Order Name Status Description    6/15/2017 0601 Inpatient Consult to Cardiology Completed           Procedures Performed: Procedure(s):  Left Heart Cath  Percutaneous Coronary Intervention                Pertinent Test Results:     Findings:     LM: The left main artery originates from the left coronary sinus. It's a decent caliber vessel. It divides into LAD and left circumflex. The left main is free of disease.     LAD: The left anterior descending coronary artery is medium caliber vessel proximally and distally it tapers off on becomes small caliber vessel. The previously placed stent in the mid LAD is patent. Proximal LAD has 90% stenosis and distal to the previously placed stent the distal LAD has 50% stenosis.      LCX: The left circumflex is a codominant vessel. It is medium to large in caliber. It courses along the anterior AV groove. It gives rise to a decent caliber first obtuse marginal branch. Gives rise to a posterolateral branch and the PDA branch. The first obtuse marginal branch has intermediate between 50 and 70% stenosis.      RCA: The right coronary artery originates from the right coronary sinus. It courses along the posterior AV groove. It gives rise to a PDA distally. Proximal LAD has 50-70% stenosis.     PDA: The PDA is free of obstructive stenosis.      Impression:  "Single vessel obstructive coronary artery disease in the proximal LAD.     Plan: Proceed with angioplasty and stent placement to the proximal LAD.       HPI/Hospital Course:  The patient is a 41 y.o. male who presented to [facility] with chest pain.  He has a history of CAD with LAD stent in 2010.  He presented with sharp sensations in his chest radiating to the upper extremities and neck.  It was worse with exertion.  He was placed on observation after initial negative work-up in the ED.  Serial cardiac enzymes trended upward and cardiology was consulted.  The patient was recommended coronary angiogram which revealed a proximal LAD lesion at 90% which underwent stent placement.  He recovered post-procedure in the CCU without incident.  Smoking cessation was discussed.  He is recommended DAPT and statin therapy.  He is stable and will be discharged to home.      Condition on Discharge:  Stable    Physical Exam on Discharge:  /75  Pulse 60  Temp 97.7 °F (36.5 °C) (Oral)   Resp 18  Ht 70\" (177.8 cm)  Wt 225 lb 1.6 oz (102 kg)  SpO2 97%  BMI 32.3 kg/m2  Physical Exam   Constitutional: He is oriented to person, place, and time. He appears well-developed and well-nourished.   HENT:   Head: Normocephalic and atraumatic.   Neck: Neck supple.   Cardiovascular: Normal rate, regular rhythm and intact distal pulses.    Pulmonary/Chest: Effort normal and breath sounds normal. No respiratory distress. He has no wheezes.   Abdominal: Soft. Bowel sounds are normal. He exhibits no distension. There is no tenderness.   Musculoskeletal: Normal range of motion. He exhibits no edema or deformity.   Neurological: He is alert and oriented to person, place, and time.   Skin: Skin is warm and dry. No erythema.   Vitals reviewed.    Discharge Disposition:  Home or Self Care    Discharge Medications:   Kristian Chandra   Home Medication Instructions ИВАН:[id number]    Printed on:06/17/17 0628   Medication " Information                      aspirin 81 MG EC tablet  Take 81 mg by mouth Daily.             carvedilol (COREG) 3.125 MG tablet  Take 1 tablet by mouth 2 (Two) Times a Day With Meals.             nitroglycerin (NITROSTAT) 0.4 MG SL tablet  Place 1 tablet under the tongue Every 5 (Five) Minutes As Needed for Chest Pain. Take no more than 3 doses in 15 minutes.             pantoprazole (PROTONIX) 40 MG EC tablet  Take 1 tablet by mouth daily.             pravastatin (PRAVACHOL) 40 MG tablet  Take 1 tablet by mouth Daily.             ticagrelor (BRILINTA) 90 MG tablet tablet  Take 1 tablet by mouth 2 (Two) Times a Day.                 Discharge Diet:   Diet Instructions     Diet: Cardiac; Thin Liquids, No Restrictions       Discharge Diet:  Cardiac   Fluid Consistency:  Thin Liquids, No Restrictions                 Activity at Discharge:   Activity Instructions     Activity as Tolerated               Additional Activity Instructions:        Take it easy for a few days. No lifting with right hand for a few days.                Follow-up Appointments:   1. PCP 1 week  2. Dr. Tineo 4 weeks     FLORI Dickinson  06/17/17  1:45 PM    Time:  Discharge planning and teaching took greater than 30 minutes.

## 2017-06-17 NOTE — PLAN OF CARE
Problem: Acute Coronary Syndrome (ACS) (Adult)  Goal: Signs and Symptoms of Listed Potential Problems Will be Absent or Manageable (Acute Coronary Syndrome)  Outcome: Outcome(s) achieved Date Met:  06/17/17

## 2017-06-17 NOTE — PROGRESS NOTES
TODAY'S DATE   06/17/17      SUBJECTIVE: Patient was seen and examined today.  He has no chest pain or chest discomfort.  He has no shortness of breath orthopnea or PND.  Patient stated that he was able to walk with no difficulty this morning.  Patient's right radial artery site has healed very well there was no complications.  His pulse is still patent.    PHYSICAL EXAM:    General Appearance:    Alert, cooperative, in no acute distress   Head:    Normocephalic, without obvious abnormality, atraumatic   Eyes:            Lids and lashes normal, conjunctivae and sclerae normal, no   icterus, no pallor, corneas clear, PERRLA   Ears:    Ears appear intact with no abnormalities noted   Throat:   No oral lesions, no thrush, oral mucosa moist   Neck:   No adenopathy, supple, trachea midline, no thyromegaly, no   carotid bruit, no JVD   Back:     No kyphosis present, no scoliosis present, no skin lesions,      erythema or scars, no tenderness to percussion or                   palpation,   range of motion normal   Lungs:     Clear to auscultation,respirations regular, even and                  unlabored    Heart:    Regular rhythm and normal rate, normal S1 and S2, no            murmur, no gallop, no rub, no click   Chest Wall:    No abnormalities observed   Abdomen:     Normal bowel sounds, no masses, no organomegaly, soft        non-tender, non-distended, no guarding, no rebound                tenderness   Rectal:     Deferred   Extremities:   Moves all extremities well, no edema, no cyanosis, no             redness   Pulses:   Pulses palpable and equal bilaterally   Skin:   No bleeding, bruising or rash   Lymph nodes:   No palpable adenopathy   Neurologic:   Cranial nerves 2 - 12 grossly intact, sensation intact, DTR       present and equal bilaterally           Vital Signs (last 24 hours)       06/16 0700  -  06/17 0659 06/17 0700  -  06/17 0935   Most Recent    Temp (°F) 97.4 -  98.2      97.7     97.7 (36.5)    Heart  Rate 52 -  76    53 -  74     60    Resp 14 -  18      18     18    /73 -  151/79    144/87 -  158/74     154/75    SpO2 (%) 92 -  98    97 -  99     99            LABS:      Results from last 7 days  Lab Units 06/17/17 0314 06/16/17  0535 06/15/17  2150   WBC 10*3/mm3 7.51 6.62 7.04   HEMOGLOBIN g/dL 15.2 16.4 16.5   PLATELETS 10*3/mm3 183 188 205       Results from last 7 days  Lab Units 06/17/17  0314 06/16/17  0535 06/15/17  2150   SODIUM mmol/L 140 139 138   POTASSIUM mmol/L 3.8 4.1 3.8   CHLORIDE mmol/L 110 105 103   TOTAL CO2 mmol/L 21.0* 26.0 25.0   BUN mg/dL 14 14 13   CREATININE mg/dL 0.93 1.05 1.01   CALCIUM mg/dL 8.1* 8.9 9.6   GLUCOSE mg/dL 91 98 110*       Results from last 7 days  Lab Units 06/15/17  2150   BILIRUBIN mg/dL 0.4   ALK PHOS U/L 102   AST (SGOT) U/L 20   ALT (SGPT) U/L 42           Results from last 7 days  Lab Units 06/15/17  2150   INR  1.02       Results from last 7 days  Lab Units 06/15/17  1558 06/15/17  1247 06/15/17  0644 06/15/17  0136   CK TOTAL U/L  --   --   --  46*   TROPONIN I ng/mL 0.063* 0.066* 0.035* 0.012   CKMB ng/mL  --   --   --  0.38       Hemoglobin A1C   Date Value Ref Range Status   06/17/2017 5.73 (H) 4 - 5.6 % Final     No results found for: POCGLU      aspirin 81 mg Oral Daily   carvedilol 3.125 mg Oral Daily   docusate sodium 100 mg Oral BID   nicotine 1 patch Transdermal Q24H   pantoprazole 40 mg Oral QAM   rosuvastatin 20 mg Oral Nightly   ticagrelor 90 mg Oral BID       sodium chloride 50 mL/hr Last Rate: 50 mL/hr (06/16/17 0942)   sodium chloride 125 mL/hr Last Rate: 125 mL/hr (06/17/17 0539)       TELEMETRY:  Normal sinus rhythm  ASSESSMENT:  1.  Non-ST segment elevation myocardial infarction  2.  Coronary artery disease  3.  Hyperlipidemia  4.  Chronic tobacco abuse    PLAN:  · Patient has been counseled regarding tobacco smoking cessation on multiple occasions.  · Patient has been counseled and advised regarding compliance with aspirin and  Brilinta  · Patient has been counseled regarding the importance of taking Crestor  · Patient has verbalized understanding.          This document has been electronically signed by Melissa Tineo MD on June 17, 2017 9:38 AM     Melissa Tineo MD [facility], [facility]

## 2017-06-17 NOTE — PLAN OF CARE
Problem: Patient Care Overview (Adult)  Goal: Plan of Care Review  Outcome: Ongoing (interventions implemented as appropriate)    06/17/17 0550   Coping/Psychosocial Response Interventions   Plan Of Care Reviewed With patient;significant other   Patient Care Overview   Progress improving   Outcome Evaluation   Outcome Summary/Follow up Plan Pt rested throughout the night. Wrist site monitored for bleeding (no complications noted); no problems identified at this time       Goal: Adult Individualization and Mutuality  Outcome: Ongoing (interventions implemented as appropriate)  Goal: Discharge Needs Assessment  Outcome: Ongoing (interventions implemented as appropriate)    Problem: Acute Coronary Syndrome (ACS) (Adult)  Goal: Signs and Symptoms of Listed Potential Problems Will be Absent or Manageable (Acute Coronary Syndrome)  Outcome: Ongoing (interventions implemented as appropriate)

## 2017-07-06 ENCOUNTER — TELEPHONE (OUTPATIENT)
Dept: CARDIOLOGY | Facility: CLINIC | Age: 42
End: 2017-07-06

## 2017-07-17 ENCOUNTER — OFFICE VISIT (OUTPATIENT)
Dept: CARDIOLOGY | Facility: CLINIC | Age: 42
End: 2017-07-17

## 2017-07-17 VITALS
HEART RATE: 78 BPM | WEIGHT: 240 LBS | HEIGHT: 70 IN | OXYGEN SATURATION: 96 % | DIASTOLIC BLOOD PRESSURE: 60 MMHG | SYSTOLIC BLOOD PRESSURE: 110 MMHG | BODY MASS INDEX: 34.36 KG/M2

## 2017-07-17 DIAGNOSIS — I25.10 CORONARY ARTERY DISEASE INVOLVING NATIVE CORONARY ARTERY OF NATIVE HEART WITHOUT ANGINA PECTORIS: Primary | ICD-10-CM

## 2017-07-17 DIAGNOSIS — Z71.6 TOBACCO ABUSE COUNSELING: ICD-10-CM

## 2017-07-17 DIAGNOSIS — E78.00 PURE HYPERCHOLESTEROLEMIA: ICD-10-CM

## 2017-07-17 PROCEDURE — 99214 OFFICE O/P EST MOD 30 MIN: CPT | Performed by: INTERNAL MEDICINE

## 2017-07-17 RX ORDER — PRAVASTATIN SODIUM 40 MG
40 TABLET ORAL DAILY
Qty: 30 TABLET | Refills: 6 | Status: SHIPPED | OUTPATIENT
Start: 2017-07-17 | End: 2018-02-20 | Stop reason: SDUPTHER

## 2017-07-19 RX ORDER — PANTOPRAZOLE SODIUM 40 MG/1
40 TABLET, DELAYED RELEASE ORAL DAILY
Qty: 30 TABLET | Refills: 11 | Status: SHIPPED | OUTPATIENT
Start: 2017-07-19 | End: 2017-07-24 | Stop reason: CLARIF

## 2017-07-24 RX ORDER — OMEPRAZOLE 40 MG/1
40 CAPSULE, DELAYED RELEASE ORAL DAILY
Qty: 30 CAPSULE | Refills: 11 | Status: SHIPPED | OUTPATIENT
Start: 2017-07-24 | End: 2017-07-26

## 2017-07-26 RX ORDER — RANITIDINE 300 MG/1
300 CAPSULE ORAL EVERY EVENING
Qty: 30 CAPSULE | Refills: 11 | Status: SHIPPED | OUTPATIENT
Start: 2017-07-26 | End: 2017-12-05

## 2017-07-28 ENCOUNTER — TELEPHONE (OUTPATIENT)
Dept: GASTROENTEROLOGY | Facility: CLINIC | Age: 42
End: 2017-07-28

## 2017-07-28 NOTE — TELEPHONE ENCOUNTER
Pt rx for Brilinta 90 mg BID required prior authorization.  Authorization is obtained and is valid 07/26/17-07/26/18.  [facility] is notified at [phone].  Attempted to contact pt at [phone].  Left voicemail with information and number to office in case he has any questions.

## 2017-07-31 NOTE — PROGRESS NOTES
Chief complaint : Coronary artery disease    History of Present Illness this is a very pleasant 51-year-old gentleman who comes today for a follow-up visit.  Patient was recently in the hospital with acute myocardial infarction.  Patient underwent successful PCI with a stent placement to his proximal LAD with a 3.5 x 23 mm xience alpine stent.  Patient stated that he has normal chest pain or chest discomfort.  He has no shortness of breath orthopnea or PND.         Review of Systems   Constitution: Negative. Negative for decreased appetite, diaphoresis, weakness, night sweats, weight gain and weight loss.   HENT: Negative for headaches, hearing loss, nosebleeds and sore throat.    Eyes: Negative.  Negative for blurred vision and photophobia.   Cardiovascular: Negative for chest pain, claudication, dyspnea on exertion, irregular heartbeat, leg swelling, palpitations, paroxysmal nocturnal dyspnea and syncope.   Respiratory: Negative for cough, hemoptysis, shortness of breath and wheezing.    Endocrine: Negative for cold intolerance, heat intolerance, polydipsia, polyphagia and polyuria.   Hematologic/Lymphatic: Negative.    Skin: Negative for color change, dry skin, flushing, itching and rash.   Musculoskeletal: Negative.  Negative for muscle cramps, muscle weakness and myalgias.   Gastrointestinal: Negative for abdominal pain, change in bowel habit, diarrhea, hematemesis, melena, nausea and vomiting.   Genitourinary: Negative for dysuria, frequency and hematuria.   Neurological: Negative for dizziness, focal weakness, light-headedness, loss of balance, numbness and seizures.   Psychiatric/Behavioral: Negative.  Negative for substance abuse, suicidal ideas and thoughts of violence.   Allergic/Immunologic: Negative.        Past Medical History:   Diagnosis Date   • Abnormal liver function tests     elevated   • Acute bronchitis    • Asthma, allergic    • Blastomycosis    • Chronic obstructive lung disease    • Chronic  "persistent hepatitis    • Coronary arteriosclerosis     post stent 2010   • Diarrhea    • Dyspnea on exertion    • Epigastric pain    • Essential hypertension    • Generalized abdominal pain    • GERD (gastroesophageal reflux disease)     with esophagitis   • H/O echocardiogram     Normal left ventricular systolic function with EF of 65%. Concentric left ventricular hypertrophy. 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     reports that he has been smoking Cigarettes.  He has been smoking about 2.00 packs per day. He has never used smokeless tobacco. He reports that he does not drink alcohol or use illicit drugs.    Objective     Vital Signs   7/17/17 6/14/17 8/24/16   /60 154/75 111/73   Heart Rate 78 60 95   Resp  18    Temp  97.7 °F (36.5 °C)    Temp src  Oral    SpO2 96 % 97 %    Weight 240 lb (109 kg) 225 lb 1.6 oz (102 kg) 232 lb 3.2 oz (105 kg)   Height 70\" (177.8 cm) 70\" (177.8 cm) 70\" (177.8 cm)   BMI (Calculated) 34.4 32.3 33.3   BSA (Calculated - sq m) 2.26 sq meters 2.2 sq meters 2.22 sq meters         Physical Exam     Physical Exam:   General Appearance:    Alert, oriented, cooperative, in no acute distress   Head:    Normocephalic, atraumatic, without obvious abnormality   Eyes:            Lids and lashes normal, conjunctivae and sclerae normal, no   icterus, no pallor   Ears:    Ears appear intact with no abnormalities noted   Throat:   Mucous membranes pink and moist   Neck:   Supple, trachea midline, no carotid bruit, no JVD   Lungs:     Clear to auscultation, respirations " regular, even and                   Unlabored. No wheezes, rales, rhonchi    Heart:    Regular rhythm and normal rate, normal S1 and S2, no            murmur, no gallop, no rub, no click   Abdomen:     Normal bowel sounds, no masses, liver and spleen nonpalpable, soft, non-tender, non-distended, no guarding, no rebound tenderness   Genitalia:    Deferred   Extremities:   Moves all extremities well, no edema, no cyanosis, no              Redness, no clubbing   Pulses:   Pulses palpable and equal bilaterally   Skin:   No bleeding, bruising or rash   Neurologic:   Alert and oriented to person, place, and time. No focal neurological deficits         Physical Exam  Procedures    Assessment/Plan     Patient Active Problem List   Diagnosis   • Chest pain   • CAD (coronary artery disease)   • Unstable angina   • Tobacco dependence   • Dyslipidemia     1. Coronary artery disease involving native coronary artery of native heart without angina pectoris  March 26, 2013:  2.5 x 15 mm Xience Xpedition stent to the right coronary artery  June 16, 2017:     3.5 x 23 mm Xience Alpine stent to the proximal LAD    Plan:  · Continue with dual antiplatelet treatment  · Continue with risk factor modification  · Patient is advised to continue with cardiac rehabilitation    2. Pure hypercholesterolemia  Component      Latest Ref Rng & Units 1/8/2014 11/17/2015 6/17/2017   Total Cholesterol      0 - 199 mg/dL 153 169 152   Triglycerides      20 - 199 mg/dL 368 (H) 127 261 (H)   HDL Cholesterol      60 - 200 mg/dL 28 (L)  23 (L)   LDL Cholesterol      1 - 129 mg/dL 51  113   LDL/HDL Ratio      0.00 - 3.55  4.25 (H) 3.34     We will continue with diet modification  Continue with current dose of pravastatin  If the LDL is not at goal we may switch patient to Crestor or Lipitor    3. Tobacco abuse counseling  Patient has quit smoking cigarettes for the last one month.  I have encouraged patient to continue with tobacco smoking cessation      I  discussed the patients findings and my recommendations with patient.          This document has been electronically signed by Melissa Tineo MD on July 31, 2017 11:18 AM     Melissa Tineo MD  07/31/17  11:15 AM

## 2017-10-04 RX ORDER — CARVEDILOL 3.12 MG/1
TABLET ORAL
Qty: 60 TABLET | Refills: 2 | Status: SHIPPED | OUTPATIENT
Start: 2017-10-04 | End: 2017-12-30 | Stop reason: SDUPTHER

## 2017-12-05 ENCOUNTER — OFFICE VISIT (OUTPATIENT)
Dept: CARDIOLOGY | Facility: CLINIC | Age: 42
End: 2017-12-05

## 2017-12-05 VITALS
BODY MASS INDEX: 35.07 KG/M2 | HEIGHT: 70 IN | HEART RATE: 60 BPM | WEIGHT: 245 LBS | SYSTOLIC BLOOD PRESSURE: 120 MMHG | DIASTOLIC BLOOD PRESSURE: 60 MMHG

## 2017-12-05 DIAGNOSIS — F17.200 TOBACCO DEPENDENCE: ICD-10-CM

## 2017-12-05 DIAGNOSIS — I25.10 CORONARY ARTERY DISEASE INVOLVING NATIVE CORONARY ARTERY OF NATIVE HEART WITHOUT ANGINA PECTORIS: Primary | ICD-10-CM

## 2017-12-05 DIAGNOSIS — E78.5 DYSLIPIDEMIA: ICD-10-CM

## 2017-12-05 DIAGNOSIS — R07.2 PRECORDIAL PAIN: ICD-10-CM

## 2017-12-05 PROBLEM — I20.0 UNSTABLE ANGINA (HCC): Status: RESOLVED | Noted: 2017-06-17 | Resolved: 2017-12-05

## 2017-12-05 PROCEDURE — 99213 OFFICE O/P EST LOW 20 MIN: CPT | Performed by: INTERNAL MEDICINE

## 2017-12-05 NOTE — PROGRESS NOTES
Kristian Chandra  42 y.o. male    12/05/2017  1. Coronary artery disease involving native coronary artery of native heart without angina pectoris    2. Tobacco dependence    3. Dyslipidemia    4. Precordial pain        History of Present Illness:Routine follow-up    41 years old patient known to Dr. Tineo with history of for CAD status post PTA stent with in stent in-stent done in June 2017.  Patient denies orthopnea, PND, nauseous, vomiting.  Polydipsia polyuria.  Denies any fever cough which it.  Present dysuria hematuria.  Denies any intermittent claudication.  Fortunately he stopped smoking.  History of gastroesophageal reflux disease for which patient is taking H2 blocker        Cath 6/2017   LAD: The left anterior descending coronary artery is medium caliber vessel proximally and distally it tapers off on becomes small caliber vessel.  The previously placed stent in the mid LAD is patent.  Proximal LAD has 90% stenosis and distal to the previously placed stent the distal LAD has 50% stenosis.Status post PTA stent of LAD      LCX: The left circumflex is a codominant vessel.  It is medium to large in caliber.  It courses along the anterior AV groove.  It gives rise to a decent caliber first obtuse marginal branch.  Gives rise to a posterolateral branch and the PDA branch.  The first obtuse marginal branch has intermediate between 50 and 70% stenosis  SUBJECTIVE    No Known Allergies      Past Medical History:   Diagnosis Date   • Abnormal liver function tests     elevated   • Acute bronchitis    • Asthma, allergic    • Blastomycosis    • Chronic obstructive lung disease    • Chronic persistent hepatitis    • Coronary arteriosclerosis     post stent 2010   • Diarrhea    • Dyspnea on exertion    • Epigastric pain    • Essential hypertension    • Generalized abdominal pain    • GERD (gastroesophageal reflux disease)     with esophagitis   • H/O echocardiogram     Normal left ventricular systolic function with  EF of 65%. Concentric left ventricular hypertrophy. 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 Postdilated using a noncompliant balloon [id number] balloon 04/17/2011       • CARDIAC CATHETERIZATION N/A 6/16/2017    Procedure: Left Heart Cath;  Surgeon: Melissa Tineo MD;  Location: [facility] CATH INVASIVE LOCATION;  Service:    • COLONOSCOPY      External and internal hemorrhoids.Medium sized lipoma at the ileocecal valve. Biopsied.Several biopsies obtained in the entire colon and in the terminal ileum. 07/08/2016       • ENDOSCOPY      Internal & external hemorrhoids found. 12/21/2011     • ENDOSCOPY      Mildly severe esophagitis. Gastritis. Normal duodenum. Biopsied. Several biopsies obtained in the lower third of the esophagus.Several biopsies obtained in the gastric antrum. 07/08/2016       • [facility] RT/LT HEART CATHETERS N/A 6/16/2017    Procedure: Percutaneous Coronary Intervention;  Surgeon: Melissa Tineo MD;  Location: [facility] CATH INVASIVE LOCATION;  Service: Cardiovascular         Family History   Problem Relation Age of Onset   • Hypertension Other          Social History     Social History   • Marital status:      Spouse name: N/A   • Number of children: N/A   • Years of education: N/A     Occupational History   • Not on file.     Social History Main Topics   • Smoking status: Current Every Day Smoker     Packs/day: 2.00     Types: Cigarettes   • Smokeless tobacco: Never Used   • Alcohol use No   • Drug use: No   • Sexual activity: Not on file     Other  "Topics Concern   • Not on file     Social History Narrative         Current Outpatient Prescriptions   Medication Sig Dispense Refill   • aspirin 81 MG EC tablet Take 81 mg by mouth Daily.     • carvedilol (COREG) 3.125 MG tablet TAKE ONE TABLET BY MOUTH TWICE A DAY WITH MEALS 60 tablet 2   • nitroglycerin (NITROSTAT) 0.4 MG SL tablet Place 1 tablet under the tongue Every 5 (Five) Minutes As Needed for Chest Pain. Take no more than 3 doses in 15 minutes. 30 tablet 0   • pravastatin (PRAVACHOL) 40 MG tablet Take 1 tablet by mouth Daily. 30 tablet 6   • ticagrelor (BRILINTA) 90 MG tablet tablet Take 1 tablet by mouth 2 (Two) Times a Day. 60 tablet 11     No current facility-administered medications for this visit.          OBJECTIVE    /60  Pulse 60  Ht 177.8 cm (70\")  Wt 111 kg (245 lb)  BMI 35.15 kg/m2        Review of Systems     Constitutional:  Denies recent weight loss, weight gain, fever or chills, no change in exercise tolerance     HENT:  Denies any hearing loss, epistaxis, hoarseness, or difficulty speaking.     Eyes: No blurry     Respiratory:  Denies dyspnea with exertion,no cough, wheezing, or hemoptysis.     Cardiovascular: See H&P     Gastrointestinal:  Denies change in bowel habits, dyspepsia, ulcer disease, hematochezia, or melena.     Endocrine: Negative for cold intolerance, heat intolerance, polydipsia, polyphagia and polyuria. Denies any history of weight change, heat/cold intolerance, polydipsia, polyuria     Genitourinary: Negative.      Musculoskeletal: Denies any history of arthritic symptoms or back problems     Skin:  Denies any change in hair or nails, rashes, or skin lesions.     Allergic/Immunologic: Negative.  Negative for environmental allergies, food allergies and immunocompromised state.     Neurological:  Denies any history of recurrent headaches, strokes, TIA, or seizure disorder.     Hematological: Denies any food allergies, seasonal allergies, bleeding disorders, or " lymphadenopathy.     Psychiatric/Behavioral: Denies any history of depression, substance abuse, or change in cognitive function.         Physical Exam     Constitutional: Cooperative, alert and oriented, well-developed, well-nourished, in no acute distress.     HENT:   Head: Normocephalic, normal hair patterns, no masses or tenderness.  Ears, Nose, and Throat: No gross abnormalities. No pallor or cyanosis. Dentition good.   Eyes: EOMS intact, PERRL, conjunctivae and lids unremarkable. Fundoscopic exam and visual fields not performed.   Neck: No palpable masses or adenopathy, no thyromegaly, no JVD, carotid pulses are full and equal bilaterally and without  Bruits.     Cardiovascular: Regular rhythm, S1 and S2 normal, no S3 or S4. Apical impulse not displaced. No murmurs, gallops, or rubs detected.     Pulmonary/Chest: Chest: normal symmetry, no tenderness to palpation, normal respiratory excursion, no intercostal retraction, no use of accessory muscles.            Pulmonary: Normal breath sounds. No rales or ronchi.    Abdominal: Abdomen soft, bowel sounds normoactive, no masses, no hepatosplenomegaly, non-tender, no bruits.     Musculoskeletal: No deformities, clubbing, cyanosis, erythema, or edema observed. There are no spinal abnormalities noted. Normal muscle strength and tone. Pulses full and equal in all extremities, no bruits auscultated.     Neurological: No gross motor or sensory deficits noted, affect appropriate, oriented to time, person, place.     Skin: Warm and dry to the touch, no apparent skin lesions or masses noted.     Psychiatric: He has a normal mood and affect. His behavior is normal. Judgment and thought content normal.         Procedures      Lab Results   Component Value Date    WBC 7.51 06/17/2017    HGB 15.2 06/17/2017    HCT 43.8 06/17/2017    MCV 85.2 06/17/2017     06/17/2017     Lab Results   Component Value Date    GLUCOSE 91 06/17/2017    BUN 14 06/17/2017    CREATININE 0.93  06/17/2017    EGFRIFNONA 89 06/17/2017    BCR 15.1 06/17/2017    CO2 21.0 (L) 06/17/2017    CALCIUM 8.1 (L) 06/17/2017    ALBUMIN 4.00 06/15/2017    LABIL2 1.4 06/15/2017    AST 20 06/15/2017    ALT 42 06/15/2017     Lab Results   Component Value Date    CHOL 152 06/17/2017     Lab Results   Component Value Date    TRIG 261 (H) 06/17/2017    TRIG 127 11/17/2015    TRIG 368 (H) 01/08/2014     Lab Results   Component Value Date    HDL 23 (L) 06/17/2017    HDL 28 (L) 01/08/2014     Lab Results   Component Value Date    LDLCALC 119 11/17/2015    LDLCALC 51 01/08/2014     No results found for: LDL  No results found for: HDLLDLRATIO  No components found for: CHOLHDL  Lab Results   Component Value Date    HGBA1C 5.73 (H) 06/17/2017     Lab Results   Component Value Date    TSH 5.28 (H) 01/14/2014           ASSESSMENT AND PLAN  . Coronary artery disease involving native coronary artery of native heart without angina pectoris  March 26, 2013:  2.5 x 15 mm Xience Xpedition stent to the right coronary artery  June 16, 2017:     3.5 x 23 mm Xience Alpine stent to the proximal LAD     Plan:  · Continue with dual antiplatelet treatment  · Continue with risk factor modification   No evidence of ongoing ischemia at the time of evaluation her sinus symptoms testing of congestive heart failure  2. Pure hypercholesterolemia  Component      Latest Ref Rng & Units 1/8/2014 11/17/2015 6/17/2017   Total Cholesterol      0 - 199 mg/dL 153 169 152   Triglycerides      20 - 199 mg/dL 368 (H) 127 261 (H)   HDL Cholesterol      60 - 200 mg/dL 28 (L)   23 (L)   LDL Cholesterol      1 - 129 mg/dL 51   113   LDL/HDL Ratio      0.00 - 3.55   4.25 (H) 3.34     Will check the lipid profile before changing statin from Pravachol to Crestor or Lipitor.    3.  History of smoking fortunately stopped smoking.    Diagnoses and all orders for this visit:    Coronary artery disease involving native coronary artery of native heart without angina  pectoris    Tobacco dependence    Dyslipidemia    Precordial pain        Laura Katz MD  12/5/2017  2:12 PM

## 2018-01-03 RX ORDER — CARVEDILOL 3.12 MG/1
TABLET ORAL
Qty: 60 TABLET | Refills: 6 | Status: SHIPPED | OUTPATIENT
Start: 2018-01-03 | End: 2018-09-27 | Stop reason: SDUPTHER

## 2018-02-20 RX ORDER — PRAVASTATIN SODIUM 40 MG
40 TABLET ORAL DAILY
Qty: 30 TABLET | Refills: 6 | Status: SHIPPED | OUTPATIENT
Start: 2018-02-20 | End: 2018-02-26 | Stop reason: SDUPTHER

## 2018-02-20 NOTE — TELEPHONE ENCOUNTER
Refill Request came in by fax from   [facility] - [place], [place] - [address] [place] [address] [facility] [facility] [address] - [phone]  - [phone] [address]  [facility] [address]  Phone: [phone] Fax: [phone]   Refill sent to pharmacy via e-scribe.

## 2018-02-26 RX ORDER — PRAVASTATIN SODIUM 40 MG
40 TABLET ORAL DAILY
Qty: 30 TABLET | Refills: 6 | Status: SHIPPED | OUTPATIENT
Start: 2018-02-26 | End: 2018-03-05 | Stop reason: SDUPTHER

## 2018-02-26 NOTE — TELEPHONE ENCOUNTER
Refill Request came in by fax from   [facility] - [place], [place] - [address] [place] [address] [facility] AT [facility] [address] - [phone]  - [phone] [address]  [facility] [address]  Phone: [phone] Fax: [phone]   Refill sent to pharmacy via e-scribe.

## 2018-03-05 RX ORDER — PRAVASTATIN SODIUM 40 MG
40 TABLET ORAL DAILY
Qty: 30 TABLET | Refills: 6 | Status: SHIPPED | OUTPATIENT
Start: 2018-03-05

## 2018-03-05 NOTE — TELEPHONE ENCOUNTER
Refill Request came in by fax from   [facility] - [place], [place] - [address] [place] [address] [place] [place] [facility] [place] [address] [phone]  - [phone] [address]  [facility] [address]  Phone: [phone] Fax: [phone]   Refill sent to pharmacy via e-scribe.

## 2018-09-27 RX ORDER — CARVEDILOL 3.12 MG/1
TABLET ORAL
Qty: 60 TABLET | Refills: 3 | Status: SHIPPED | OUTPATIENT
Start: 2018-09-27 | End: 2019-01-18 | Stop reason: SDUPTHER

## 2018-10-31 DIAGNOSIS — Z79.899 ON LONG TERM DRUG THERAPY: Primary | ICD-10-CM

## 2018-11-09 ENCOUNTER — DOCUMENTATION (OUTPATIENT)
Dept: CARDIOLOGY | Facility: CLINIC | Age: 43
End: 2018-11-09

## 2018-11-12 ENCOUNTER — DOCUMENTATION (OUTPATIENT)
Dept: CARDIOLOGY | Facility: CLINIC | Age: 43
End: 2018-11-12

## 2018-11-12 NOTE — PROGRESS NOTES
Received PA approval for patient on his Brilinta through November 2019.  Also told patient he needed to go get his Hepatic function panel done. Patient going to get his labs done this week per our phone conversation.

## 2018-11-21 ENCOUNTER — DOCUMENTATION (OUTPATIENT)
Dept: CARDIOLOGY | Facility: CLINIC | Age: 43
End: 2018-11-21

## 2018-11-21 NOTE — PROGRESS NOTES
Patient is to stop Brilinta and aspirin 3-4 days prior to dental work and resume the medications after. Patient understood. Also called [facility] and informed of same.

## 2019-01-18 RX ORDER — CARVEDILOL 3.12 MG/1
TABLET ORAL
Qty: 60 TABLET | Refills: 3 | Status: SHIPPED | OUTPATIENT
Start: 2019-01-18 | End: 2019-05-17 | Stop reason: SDUPTHER

## 2019-01-22 DIAGNOSIS — Z79.899 ON LONG TERM DRUG THERAPY: ICD-10-CM

## 2019-02-13 ENCOUNTER — TELEPHONE (OUTPATIENT)
Dept: FAMILY MEDICINE CLINIC | Facility: CLINIC | Age: 44
End: 2019-02-13

## 2019-02-13 NOTE — TELEPHONE ENCOUNTER
He has not seen Dr Olvera since 2014.  He would be considered a new patient.  Dr. Olvera is not accepting new patients.  He will need to establish with either Dr. Leggett or Reynaldo Albert.  Or any other provider that may be taking new patients.  Juhi  Thank you

## 2019-02-13 NOTE — TELEPHONE ENCOUNTER
Patient called and said his insurance called wanting him to have a wellness visit. Once looking this patient has not been seen since 2015. Will Dr Wade abel see him? Call patient at [phone]

## 2019-03-20 ENCOUNTER — DOCUMENTATION (OUTPATIENT)
Dept: CARDIOLOGY | Facility: CLINIC | Age: 44
End: 2019-03-20

## 2019-03-20 NOTE — PROGRESS NOTES
Patient called wanting Melissa changed.  I told patient we could not change any medications or authorize any refills  until he sees Dr. Katz.  He has not seen Dr. Katz since December of 2017.  Patient understood. I transferred him up front for an appointment.

## 2019-05-17 RX ORDER — CARVEDILOL 3.12 MG/1
TABLET ORAL
Qty: 60 TABLET | Refills: 3 | Status: SHIPPED | OUTPATIENT
Start: 2019-05-17 | End: 2019-11-07 | Stop reason: SDUPTHER

## 2019-11-07 RX ORDER — CARVEDILOL 3.12 MG/1
3.12 TABLET ORAL 2 TIMES DAILY WITH MEALS
Qty: 60 TABLET | Refills: 1 | Status: SHIPPED | OUTPATIENT
Start: 2019-11-07

## 2019-11-07 NOTE — TELEPHONE ENCOUNTER
Patient has lost his insurance.  He has not been here because he cannot pay.  He no longer takes Brillinta.  He wanted a refill on his Coreg.  I gave him one month with one refill  and told him he needs to see us or his PCP so he can continue to get his medications filled because we cannot fill them anymore without an appointment.

## 2023-12-21 ENCOUNTER — TELEPHONE (OUTPATIENT)
Dept: CARDIAC SURGERY | Facility: CLINIC | Age: 48
End: 2023-12-21
Payer: MEDICAID

## 2023-12-21 NOTE — TELEPHONE ENCOUNTER
Called patient to discuss open heart surgery with Dr. Delgado. Notified patient that Dr. Delgado recommends he come in for an office evaluation on 12/28 for surgery on 12/29. Patient agreeable to office admit on 12/28 with surgery on 12/29. Appointment scheduled on 12/28 at 1:15 pm with Dr. Delgado. Message sent to surgery scheduling.

## 2023-12-22 ENCOUNTER — PREP FOR SURGERY (OUTPATIENT)
Dept: OTHER | Facility: HOSPITAL | Age: 48
End: 2023-12-22
Payer: MEDICAID

## 2023-12-22 DIAGNOSIS — I25.10 CORONARY ARTERY DISEASE INVOLVING NATIVE CORONARY ARTERY OF NATIVE HEART WITHOUT ANGINA PECTORIS: Primary | ICD-10-CM

## 2023-12-26 ENCOUNTER — TELEPHONE (OUTPATIENT)
Dept: CARDIAC SURGERY | Facility: CLINIC | Age: 48
End: 2023-12-26
Payer: MEDICAID

## 2023-12-28 ENCOUNTER — OFFICE VISIT (OUTPATIENT)
Dept: CARDIAC SURGERY | Facility: CLINIC | Age: 48
End: 2023-12-28
Payer: MEDICAID

## 2023-12-28 ENCOUNTER — APPOINTMENT (OUTPATIENT)
Dept: CARDIOLOGY | Facility: HOSPITAL | Age: 48
End: 2023-12-28
Payer: MEDICAID

## 2023-12-28 ENCOUNTER — HOSPITAL ENCOUNTER (INPATIENT)
Facility: HOSPITAL | Age: 48
LOS: 6 days | Discharge: HOME HEALTH | End: 2024-01-03
Attending: THORACIC SURGERY (CARDIOTHORACIC VASCULAR SURGERY) | Admitting: THORACIC SURGERY (CARDIOTHORACIC VASCULAR SURGERY)
Payer: MEDICAID

## 2023-12-28 ENCOUNTER — APPOINTMENT (OUTPATIENT)
Dept: GENERAL RADIOLOGY | Facility: HOSPITAL | Age: 48
End: 2023-12-28
Payer: MEDICAID

## 2023-12-28 ENCOUNTER — APPOINTMENT (OUTPATIENT)
Dept: CT IMAGING | Facility: HOSPITAL | Age: 48
End: 2023-12-28
Payer: MEDICAID

## 2023-12-28 VITALS
HEIGHT: 70 IN | SYSTOLIC BLOOD PRESSURE: 118 MMHG | DIASTOLIC BLOOD PRESSURE: 74 MMHG | OXYGEN SATURATION: 97 % | HEART RATE: 80 BPM | WEIGHT: 240 LBS | BODY MASS INDEX: 34.36 KG/M2 | RESPIRATION RATE: 18 BRPM | TEMPERATURE: 98 F

## 2023-12-28 DIAGNOSIS — Z95.1 S/P CABG (CORONARY ARTERY BYPASS GRAFT): Primary | ICD-10-CM

## 2023-12-28 DIAGNOSIS — E78.5 DYSLIPIDEMIA: ICD-10-CM

## 2023-12-28 DIAGNOSIS — F17.200 TOBACCO DEPENDENCE: ICD-10-CM

## 2023-12-28 DIAGNOSIS — I25.118 CORONARY ARTERY DISEASE OF NATIVE ARTERY OF NATIVE HEART WITH STABLE ANGINA PECTORIS: Primary | ICD-10-CM

## 2023-12-28 DIAGNOSIS — R07.2 PRECORDIAL PAIN: ICD-10-CM

## 2023-12-28 LAB
ABO GROUP BLD: NORMAL
ALBUMIN SERPL-MCNC: 4.2 G/DL (ref 3.5–5.2)
ALBUMIN/GLOB SERPL: 1.6 G/DL
ALP SERPL-CCNC: 102 U/L (ref 39–117)
ALT SERPL W P-5'-P-CCNC: 84 U/L (ref 1–41)
ANION GAP SERPL CALCULATED.3IONS-SCNC: 9.8 MMOL/L (ref 5–15)
APTT PPP: 32.7 SECONDS (ref 22.7–35.4)
ARTERIAL PATENCY WRIST A: POSITIVE
AST SERPL-CCNC: 44 U/L (ref 1–40)
ATMOSPHERIC PRESS: 743.6 MMHG
BASE EXCESS BLDA CALC-SCNC: -1.7 MMOL/L (ref 0–2)
BASOPHILS # BLD AUTO: 0.05 10*3/MM3 (ref 0–0.2)
BASOPHILS NFR BLD AUTO: 0.8 % (ref 0–1.5)
BDY SITE: ABNORMAL
BH CV XLRA MEAS - DIST GSV CALF DIST LEFT: 0.36 CM
BH CV XLRA MEAS - DIST GSV CALF DIST RIGHT: 0.33 CM
BH CV XLRA MEAS - DIST GSV THIGH DIST LEFT: 0.38 CM
BH CV XLRA MEAS - DIST GSV THIGH DIST RIGHT: 0.34 CM
BH CV XLRA MEAS - DIST LSV CALF DIST LEFT: 0.25 CM
BH CV XLRA MEAS - DIST LSV CALF DIST RIGHT: 0.27 CM
BH CV XLRA MEAS - GSV ANKLE DIST LEFT: 0.34 CM
BH CV XLRA MEAS - GSV ANKLE DIST RIGHT: 0.33 CM
BH CV XLRA MEAS - GSV KNEE DIST LEFT: 0.42 CM
BH CV XLRA MEAS - GSV KNEE DIST RIGHT: 0.29 CM
BH CV XLRA MEAS - GSV ORIGIN DIST LEFT: 0.41 CM
BH CV XLRA MEAS - GSV ORIGIN DIST RIGHT: 0.58 CM
BH CV XLRA MEAS - MID GSV CALF LEFT: 0.3 CM
BH CV XLRA MEAS - MID GSV CALF RIGHT: 0.28 CM
BH CV XLRA MEAS - MID GSV THIGH  LEFT: 0.26 CM
BH CV XLRA MEAS - MID GSV THIGH  RIGHT: 0.32 CM
BH CV XLRA MEAS - MID LSV CALF DIST LEFT: 0.21 CM
BH CV XLRA MEAS - MID LSV CALF DIST RIGHT: 0.23 CM
BH CV XLRA MEAS - PROX GSV CALF DIST LEFT: 0.31 CM
BH CV XLRA MEAS - PROX GSV CALF DIST RIGHT: 0.37 CM
BH CV XLRA MEAS - PROX GSV THIGH  LEFT: 0.32 CM
BH CV XLRA MEAS - PROX GSV THIGH  RIGHT: 0.42 CM
BH CV XLRA MEAS - PROX LSV CALF DIST LEFT: 0.3 CM
BH CV XLRA MEAS - PROX LSV CALF DIST RIGHT: 0.27 CM
BH CV XLRA MEAS LEFT DIST CCA EDV: -22.4 CM/SEC
BH CV XLRA MEAS LEFT DIST CCA PSV: -86.8 CM/SEC
BH CV XLRA MEAS LEFT DIST ICA EDV: -33 CM/SEC
BH CV XLRA MEAS LEFT DIST ICA PSV: -75.8 CM/SEC
BH CV XLRA MEAS LEFT ICA/CCA RATIO: 1.14
BH CV XLRA MEAS LEFT MID ICA EDV: -34 CM/SEC
BH CV XLRA MEAS LEFT MID ICA PSV: -99.1 CM/SEC
BH CV XLRA MEAS LEFT PROX CCA EDV: 22.8 CM/SEC
BH CV XLRA MEAS LEFT PROX CCA PSV: 117 CM/SEC
BH CV XLRA MEAS LEFT PROX ECA EDV: -22.3 CM/SEC
BH CV XLRA MEAS LEFT PROX ECA PSV: -107 CM/SEC
BH CV XLRA MEAS LEFT PROX ICA EDV: -28.1 CM/SEC
BH CV XLRA MEAS LEFT PROX ICA PSV: -73.9 CM/SEC
BH CV XLRA MEAS LEFT PROX SCLA PSV: 153 CM/SEC
BH CV XLRA MEAS LEFT VERTEBRAL A EDV: -13.6 CM/SEC
BH CV XLRA MEAS LEFT VERTEBRAL A PSV: -31.2 CM/SEC
BH CV XLRA MEAS RIGHT DIST CCA EDV: -19.3 CM/SEC
BH CV XLRA MEAS RIGHT DIST CCA PSV: -71.9 CM/SEC
BH CV XLRA MEAS RIGHT DIST ICA EDV: -26.3 CM/SEC
BH CV XLRA MEAS RIGHT DIST ICA PSV: -72.3 CM/SEC
BH CV XLRA MEAS RIGHT ICA/CCA RATIO: 1.01
BH CV XLRA MEAS RIGHT MID ICA EDV: -25.1 CM/SEC
BH CV XLRA MEAS RIGHT MID ICA PSV: -71.9 CM/SEC
BH CV XLRA MEAS RIGHT PROX CCA EDV: 19.3 CM/SEC
BH CV XLRA MEAS RIGHT PROX CCA PSV: 96.2 CM/SEC
BH CV XLRA MEAS RIGHT PROX ECA EDV: -21.2 CM/SEC
BH CV XLRA MEAS RIGHT PROX ECA PSV: -129 CM/SEC
BH CV XLRA MEAS RIGHT PROX ICA EDV: -21.2 CM/SEC
BH CV XLRA MEAS RIGHT PROX ICA PSV: -59.7 CM/SEC
BH CV XLRA MEAS RIGHT PROX SCLA PSV: 258 CM/SEC
BH CV XLRA MEAS RIGHT VERTEBRAL A EDV: -6.7 CM/SEC
BH CV XLRA MEAS RIGHT VERTEBRAL A PSV: -39.7 CM/SEC
BILIRUB SERPL-MCNC: 0.6 MG/DL (ref 0–1.2)
BILIRUB UR QL STRIP: NEGATIVE
BLD GP AB SCN SERPL QL: NEGATIVE
BUN SERPL-MCNC: 10 MG/DL (ref 6–20)
BUN/CREAT SERPL: 9.5 (ref 7–25)
CALCIUM SPEC-SCNC: 8.9 MG/DL (ref 8.6–10.5)
CHLORIDE SERPL-SCNC: 105 MMOL/L (ref 98–107)
CHOLEST SERPL-MCNC: 98 MG/DL (ref 0–200)
CLARITY UR: CLEAR
CLOSE TME COLL+ADP + EPINEP PNL BLD: 96 % (ref 86–100)
CO2 BLDA-SCNC: 23.3 MMOL/L (ref 23–27)
CO2 SERPL-SCNC: 21.2 MMOL/L (ref 22–29)
COLOR UR: YELLOW
CREAT SERPL-MCNC: 1.05 MG/DL (ref 0.76–1.27)
DEPRECATED RDW RBC AUTO: 46.5 FL (ref 37–54)
DEVICE COMMENT: ABNORMAL
EGFRCR SERPLBLD CKD-EPI 2021: 87 ML/MIN/1.73
EOSINOPHIL # BLD AUTO: 0.12 10*3/MM3 (ref 0–0.4)
EOSINOPHIL NFR BLD AUTO: 1.8 % (ref 0.3–6.2)
ERYTHROCYTE [DISTWIDTH] IN BLOOD BY AUTOMATED COUNT: 14.4 % (ref 12.3–15.4)
GLOBULIN UR ELPH-MCNC: 2.7 GM/DL
GLUCOSE SERPL-MCNC: 192 MG/DL (ref 65–99)
GLUCOSE UR STRIP-MCNC: ABNORMAL MG/DL
HBA1C MFR BLD: 8.2 % (ref 4.8–5.6)
HCO3 BLDA-SCNC: 22.2 MMOL/L (ref 22–28)
HCT VFR BLD AUTO: 45.2 % (ref 37.5–51)
HDLC SERPL-MCNC: 23 MG/DL (ref 40–60)
HEMODILUTION: NO
HGB BLD-MCNC: 15.4 G/DL (ref 13–17.7)
HGB UR QL STRIP.AUTO: NEGATIVE
IMM GRANULOCYTES # BLD AUTO: 0.02 10*3/MM3 (ref 0–0.05)
IMM GRANULOCYTES NFR BLD AUTO: 0.3 % (ref 0–0.5)
INR PPP: 1.1 (ref 0.9–1.1)
KETONES UR QL STRIP: NEGATIVE
LDLC SERPL CALC-MCNC: 33 MG/DL (ref 0–100)
LDLC/HDLC SERPL: 0.89 {RATIO}
LEUKOCYTE ESTERASE UR QL STRIP.AUTO: NEGATIVE
LYMPHOCYTES # BLD AUTO: 2 10*3/MM3 (ref 0.7–3.1)
LYMPHOCYTES NFR BLD AUTO: 30.5 % (ref 19.6–45.3)
MAGNESIUM SERPL-MCNC: 2 MG/DL (ref 1.6–2.6)
MCH RBC QN AUTO: 30 PG (ref 26.6–33)
MCHC RBC AUTO-ENTMCNC: 34.1 G/DL (ref 31.5–35.7)
MCV RBC AUTO: 87.9 FL (ref 79–97)
MODALITY: ABNORMAL
MONOCYTES # BLD AUTO: 0.4 10*3/MM3 (ref 0.1–0.9)
MONOCYTES NFR BLD AUTO: 6.1 % (ref 5–12)
NEUTROPHILS NFR BLD AUTO: 3.96 10*3/MM3 (ref 1.7–7)
NEUTROPHILS NFR BLD AUTO: 60.5 % (ref 42.7–76)
NITRITE UR QL STRIP: NEGATIVE
NRBC BLD AUTO-RTO: 0 /100 WBC (ref 0–0.2)
NT-PROBNP SERPL-MCNC: <36 PG/ML (ref 0–450)
PCO2 BLDA: 34.5 MM HG (ref 35–45)
PH BLDA: 7.42 PH UNITS (ref 7.35–7.45)
PH UR STRIP.AUTO: 5.5 [PH] (ref 5–8)
PLATELET # BLD AUTO: 261 10*3/MM3 (ref 140–450)
PMV BLD AUTO: 9.6 FL (ref 6–12)
PO2 BLDA: 85.5 MM HG (ref 80–100)
POTASSIUM SERPL-SCNC: 4.1 MMOL/L (ref 3.5–5.2)
PROT SERPL-MCNC: 6.9 G/DL (ref 6–8.5)
PROT UR QL STRIP: NEGATIVE
PROTHROMBIN TIME: 14.3 SECONDS (ref 11.7–14.2)
RBC # BLD AUTO: 5.14 10*6/MM3 (ref 4.14–5.8)
RH BLD: POSITIVE
SAO2 % BLDCOA: 96.7 % (ref 92–98.5)
SARS-COV-2 RNA RESP QL NAA+PROBE: NOT DETECTED
SET MECH RESP RATE: 20
SODIUM SERPL-SCNC: 136 MMOL/L (ref 136–145)
SP GR UR STRIP: 1.02 (ref 1–1.03)
T&S EXPIRATION DATE: NORMAL
TOTAL RATE: 20 BREATHS/MINUTE
TRIGL SERPL-MCNC: 273 MG/DL (ref 0–150)
UROBILINOGEN UR QL STRIP: ABNORMAL
VLDLC SERPL-MCNC: 42 MG/DL (ref 5–40)
WBC NRBC COR # BLD AUTO: 6.55 10*3/MM3 (ref 3.4–10.8)

## 2023-12-28 PROCEDURE — 99203 OFFICE O/P NEW LOW 30 MIN: CPT | Performed by: THORACIC SURGERY (CARDIOTHORACIC VASCULAR SURGERY)

## 2023-12-28 PROCEDURE — 86901 BLOOD TYPING SEROLOGIC RH(D): CPT | Performed by: NURSE PRACTITIONER

## 2023-12-28 PROCEDURE — 85730 THROMBOPLASTIN TIME PARTIAL: CPT | Performed by: NURSE PRACTITIONER

## 2023-12-28 PROCEDURE — 93880 EXTRACRANIAL BILAT STUDY: CPT

## 2023-12-28 PROCEDURE — 85610 PROTHROMBIN TIME: CPT | Performed by: NURSE PRACTITIONER

## 2023-12-28 PROCEDURE — 81003 URINALYSIS AUTO W/O SCOPE: CPT | Performed by: NURSE PRACTITIONER

## 2023-12-28 PROCEDURE — 71250 CT THORAX DX C-: CPT

## 2023-12-28 PROCEDURE — 86920 COMPATIBILITY TEST SPIN: CPT

## 2023-12-28 PROCEDURE — 86900 BLOOD TYPING SEROLOGIC ABO: CPT | Performed by: NURSE PRACTITIONER

## 2023-12-28 PROCEDURE — 82803 BLOOD GASES ANY COMBINATION: CPT

## 2023-12-28 PROCEDURE — 80053 COMPREHEN METABOLIC PANEL: CPT | Performed by: NURSE PRACTITIONER

## 2023-12-28 PROCEDURE — 36600 WITHDRAWAL OF ARTERIAL BLOOD: CPT

## 2023-12-28 PROCEDURE — 71046 X-RAY EXAM CHEST 2 VIEWS: CPT

## 2023-12-28 PROCEDURE — 87635 SARS-COV-2 COVID-19 AMP PRB: CPT | Performed by: NURSE PRACTITIONER

## 2023-12-28 PROCEDURE — 83036 HEMOGLOBIN GLYCOSYLATED A1C: CPT | Performed by: NURSE PRACTITIONER

## 2023-12-28 PROCEDURE — 85576 BLOOD PLATELET AGGREGATION: CPT | Performed by: NURSE PRACTITIONER

## 2023-12-28 PROCEDURE — 83880 ASSAY OF NATRIURETIC PEPTIDE: CPT | Performed by: NURSE PRACTITIONER

## 2023-12-28 PROCEDURE — 80061 LIPID PANEL: CPT | Performed by: NURSE PRACTITIONER

## 2023-12-28 PROCEDURE — 83735 ASSAY OF MAGNESIUM: CPT | Performed by: NURSE PRACTITIONER

## 2023-12-28 PROCEDURE — 93970 EXTREMITY STUDY: CPT

## 2023-12-28 PROCEDURE — 86850 RBC ANTIBODY SCREEN: CPT | Performed by: NURSE PRACTITIONER

## 2023-12-28 PROCEDURE — 85025 COMPLETE CBC W/AUTO DIFF WBC: CPT | Performed by: NURSE PRACTITIONER

## 2023-12-28 RX ORDER — ESOMEPRAZOLE MAGNESIUM 40 MG/1
40 CAPSULE, DELAYED RELEASE ORAL
Status: ON HOLD | COMMUNITY

## 2023-12-28 RX ORDER — ATORVASTATIN CALCIUM 20 MG/1
40 TABLET, FILM COATED ORAL NIGHTLY
Status: DISCONTINUED | OUTPATIENT
Start: 2023-12-28 | End: 2023-12-29

## 2023-12-28 RX ORDER — ONDANSETRON 2 MG/ML
4 INJECTION INTRAMUSCULAR; INTRAVENOUS EVERY 6 HOURS PRN
Status: DISCONTINUED | OUTPATIENT
Start: 2023-12-28 | End: 2023-12-29

## 2023-12-28 RX ORDER — LORATADINE 10 MG/1
10 TABLET ORAL
Status: ON HOLD | COMMUNITY

## 2023-12-28 RX ORDER — BLOOD-GLUCOSE METER
KIT MISCELLANEOUS
Status: ON HOLD | COMMUNITY
Start: 2023-12-22

## 2023-12-28 RX ORDER — ATORVASTATIN CALCIUM 40 MG/1
40 TABLET, FILM COATED ORAL NIGHTLY
Status: ON HOLD | COMMUNITY
Start: 2023-12-21

## 2023-12-28 RX ORDER — CHLORHEXIDINE GLUCONATE ORAL RINSE 1.2 MG/ML
15 SOLUTION DENTAL EVERY 12 HOURS SCHEDULED
Status: DISCONTINUED | OUTPATIENT
Start: 2023-12-28 | End: 2023-12-29

## 2023-12-28 RX ORDER — NITROGLYCERIN 0.4 MG/1
0.4 TABLET SUBLINGUAL
Status: DISCONTINUED | OUTPATIENT
Start: 2023-12-28 | End: 2023-12-29

## 2023-12-28 RX ORDER — AMOXICILLIN 250 MG
2 CAPSULE ORAL 2 TIMES DAILY
Status: DISCONTINUED | OUTPATIENT
Start: 2023-12-28 | End: 2023-12-29

## 2023-12-28 RX ORDER — SODIUM CHLORIDE 0.9 % (FLUSH) 0.9 %
10 SYRINGE (ML) INJECTION EVERY 12 HOURS SCHEDULED
Status: DISCONTINUED | OUTPATIENT
Start: 2023-12-28 | End: 2023-12-29

## 2023-12-28 RX ORDER — LANCETS 28 GAUGE
EACH MISCELLANEOUS
Status: ON HOLD | COMMUNITY
Start: 2023-12-21

## 2023-12-28 RX ORDER — ACETAMINOPHEN 325 MG/1
325 TABLET ORAL EVERY 4 HOURS PRN
Status: DISCONTINUED | OUTPATIENT
Start: 2023-12-28 | End: 2023-12-29

## 2023-12-28 RX ORDER — BISACODYL 5 MG/1
5 TABLET, DELAYED RELEASE ORAL DAILY PRN
Status: DISCONTINUED | OUTPATIENT
Start: 2023-12-28 | End: 2023-12-29

## 2023-12-28 RX ORDER — SODIUM CHLORIDE 9 MG/ML
40 INJECTION, SOLUTION INTRAVENOUS AS NEEDED
Status: DISCONTINUED | OUTPATIENT
Start: 2023-12-28 | End: 2023-12-29

## 2023-12-28 RX ORDER — CEFAZOLIN SODIUM 2 G/100ML
2000 INJECTION, SOLUTION INTRAVENOUS
Status: COMPLETED | OUTPATIENT
Start: 2023-12-29 | End: 2023-12-29

## 2023-12-28 RX ORDER — FLUTICASONE FUROATE, UMECLIDINIUM BROMIDE AND VILANTEROL TRIFENATATE 200; 62.5; 25 UG/1; UG/1; UG/1
POWDER RESPIRATORY (INHALATION)
COMMUNITY

## 2023-12-28 RX ORDER — CHLORHEXIDINE GLUCONATE 500 MG/1
1 CLOTH TOPICAL EVERY 12 HOURS
Status: DISCONTINUED | OUTPATIENT
Start: 2023-12-28 | End: 2023-12-29

## 2023-12-28 RX ORDER — BISACODYL 10 MG
10 SUPPOSITORY, RECTAL RECTAL DAILY PRN
Status: DISCONTINUED | OUTPATIENT
Start: 2023-12-28 | End: 2023-12-29

## 2023-12-28 RX ORDER — POLYETHYLENE GLYCOL 3350 17 G/17G
17 POWDER, FOR SOLUTION ORAL DAILY PRN
Status: DISCONTINUED | OUTPATIENT
Start: 2023-12-28 | End: 2023-12-29

## 2023-12-28 RX ORDER — ALPRAZOLAM 0.25 MG/1
0.25 TABLET ORAL EVERY 8 HOURS PRN
Status: DISCONTINUED | OUTPATIENT
Start: 2023-12-28 | End: 2023-12-29

## 2023-12-28 RX ORDER — SODIUM CHLORIDE 0.9 % (FLUSH) 0.9 %
10 SYRINGE (ML) INJECTION AS NEEDED
Status: DISCONTINUED | OUTPATIENT
Start: 2023-12-28 | End: 2023-12-29

## 2023-12-28 RX ORDER — LISINOPRIL 5 MG/1
5 TABLET ORAL DAILY
Status: ON HOLD | COMMUNITY
Start: 2023-12-22 | End: 2024-06-18

## 2023-12-28 RX ADMIN — MUPIROCIN 1 APPLICATION: 20 OINTMENT TOPICAL at 22:23

## 2023-12-28 RX ADMIN — DOCUSATE SODIUM 50MG AND SENNOSIDES 8.6MG 2 TABLET: 8.6; 5 TABLET, FILM COATED ORAL at 22:23

## 2023-12-28 RX ADMIN — ALPRAZOLAM 0.25 MG: 0.25 TABLET ORAL at 22:22

## 2023-12-28 RX ADMIN — Medication 10 ML: at 22:23

## 2023-12-28 RX ADMIN — ATORVASTATIN CALCIUM 40 MG: 20 TABLET, FILM COATED ORAL at 22:22

## 2023-12-28 RX ADMIN — 0.12% CHLORHEXIDINE GLUCONATE 15 ML: 1.2 RINSE ORAL at 22:23

## 2023-12-28 NOTE — H&P (VIEW-ONLY)
12/28/2023      Subjective:      Grabiel Olvera MD (Inactive)    Chief Complaint: Chest pain    History of Present Illness:       Dear Grabiel Red MD (Inactive) and Colleagues,  It was nice to see Kristian Chandra in consultation at your request. He is a 48 y.o. male with a history of coronary artery disease who has developed class III angina. He denies heart failure. The ECHO that I reviewed personally shows normal echocardiogram. The Cardiac Cath that I reviewed personally shows multivessel coronary disease with 90% LAD and diagonal.  70% OM1 and OM 3.  80% RCA.  Ejection fraction is 60%.  He stopped taking his Brilinta the last week and is now admitted for surgery..    Patient Active Problem List   Diagnosis    Chest pain    CAD (coronary artery disease)    Tobacco dependence    Dyslipidemia       Past Medical History:   Diagnosis Date    Abnormal liver function tests     elevated    Acute bronchitis     Asthma, allergic     Blastomycosis     Chronic obstructive lung disease     Chronic persistent hepatitis     Coronary arteriosclerosis     post stent 2010    Diabetes mellitus 2023    Diarrhea     Dyspnea on exertion     Epigastric pain     Essential hypertension     Generalized abdominal pain     GERD (gastroesophageal reflux disease)     with esophagitis    H/O echocardiogram     Normal left ventricular systolic function with EF of 65%. Concentric left ventricular hypertrophy. 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  Postdilated using a noncompliant balloon [id number] balloon 04/17/2011        CARDIAC CATHETERIZATION N/A 06/16/2017    Procedure: Left Heart Cath;  Surgeon: Melissa Tineo MD;  Location: [facility] INVASIVE LOCATION;  Service:     COLONOSCOPY      External and internal hemorrhoids.Medium sized lipoma at the ileocecal valve. Biopsied.Several biopsies obtained in the entire colon and in the terminal ileum. 07/08/2016        CORONARY STENT PLACEMENT  2017    ENDOSCOPY      Internal & external hemorrhoids found. 12/21/2011      ENDOSCOPY      Mildly severe esophagitis. Gastritis. Normal duodenum. Biopsied. Several biopsies obtained in the lower third of the esophagus.Several biopsies obtained in the gastric antrum. 07/08/2016        [place] RT/LT HEART CATHETERS N/A 06/16/2017    Procedure: Percutaneous Coronary Intervention;  Surgeon: Melissa Tineo MD;  Location: [facility] INVASIVE LOCATION;  Service: Cardiovascular       Allergies   Allergen Reactions    Wound Dressing Adhesive Dermatitis         Current Outpatient Medications:     Aspirin 325 MG capsule, 1 capsule Daily., Disp: , Rfl:     atorvastatin (LIPITOR) 40 MG tablet, Take 1 tablet by mouth Every Night., Disp: , Rfl:     esomeprazole (nexIUM) 40 MG capsule, Take 1 capsule by mouth., Disp: , Rfl:     FREESTYLE LITE test strip, USE 1 STRIP 4 TIMES DAILY, Disp: , Rfl:     Lancets (freestyle) lancets, 3 TIMES A DAY, Disp: , Rfl:     lisinopril (PRINIVIL,ZESTRIL) 5 MG tablet, Take 1 tablet by mouth Daily., Disp: , Rfl:     loratadine (CLARITIN) 10 MG tablet, Take 1 tablet by mouth., Disp: , Rfl:     nitroglycerin (NITROSTAT) 0.4 MG SL tablet, Place 1 tablet under the tongue Every 5 (Five) Minutes As Needed for Chest Pain. Take no more than 3 doses in 15 minutes., Disp: 30 tablet, Rfl: 0    Social History     Socioeconomic History    Marital status:    Tobacco Use    Smoking status: Former     Packs/day: 2.00     Years: 15.00     Additional pack years: 0.00      Total pack years: 30.00     Types: Cigarettes     Start date: 1990     Quit date: 6/15/2017     Years since quittin.5    Smokeless tobacco: Never   Vaping Use    Vaping Use: Never used   Substance and Sexual Activity    Alcohol use: No    Drug use: No    Sexual activity: Yes     Partners: Female       Family History   Problem Relation Age of Onset    Hypertension Other            Review of Systems:  Review of Systems   Constitutional:  Positive for activity change and fatigue.   HENT: Negative.     Eyes: Negative.    Respiratory:  Positive for chest tightness.    Cardiovascular:  Positive for chest pain.   Gastrointestinal: Negative.    Endocrine: Negative.    Genitourinary: Negative.    Musculoskeletal: Negative.    Allergic/Immunologic: Negative.    Neurological: Negative.    Hematological: Negative.    Psychiatric/Behavioral: Negative.       Cardiovascular ROS: positive for - chest pain  Physical Exam:    Vital Signs:  Weight: 109 kg (240 lb)   Body mass index is 34.44 kg/m².  Temp: 98 °F (36.7 °C)   Heart Rate: 80   BP: 118/74     Constitutional:       Appearance: Healthy appearance.   Eyes:      Conjunctiva/sclera: Conjunctivae normal.      Pupils: Pupils are equal, round, and reactive to light.   HENT:      Nose: Nose normal.    Mouth/Throat:      Dentition: Normal.      Pharynx: Oropharynx is clear.   Neck:      Vascular: JVD normal.   Pulmonary:      Effort: Pulmonary effort is normal.      Breath sounds: Normal breath sounds.   Chest:      Chest wall: Not tender to palpatation.   Cardiovascular:      PMI at left midclavicular line. Normal rate. Regular rhythm. Normal S1. Normal S2.       Murmurs: There is no murmur.      No gallop.  No click. No rub.   Pulses:     Carotid: 4+ with bruit bilaterally.     Radial: 4+ bilaterally.     Femoral: 4+ bilaterally.     Dorsalis pedis: 4+ bilaterally.     Posterior tibial: 4+ bilaterally.  Edema:     Peripheral edema absent.   Abdominal:      General: Bowel  sounds are normal.      Palpations: Abdomen is soft.   Musculoskeletal: Normal range of motion.      Cervical back: Normal range of motion and neck supple. Skin:     General: Skin is warm and dry.   Neurological:      Mental Status: Alert and oriented to person, place and time.          Assessment:     Multivessel coronary disease in a diabetic.  He is young and it is possible we can do to mammary's we will have to see what his A1c records.          Recommendation/Plan:       Admit today for surgery tomorrow.  He has been off his Brilinta but will check his platelet aggregation.  I think will be fine.  I discussed all this with the patient and his new bride.  They were  yesterday.  All questions were answered.        Thank you for allowing me to participate in his care.    Regards,    Jimmy Delgado MD   no

## 2023-12-28 NOTE — PLAN OF CARE
Goal Outcome Evaluation:  Plan of Care Reviewed With: patient        Progress: no change  Outcome Evaluation: 49 yo male, AOx4 direct admitted from office for CABG tomorrow. Preop testing completed, consent signed, NPO after mn. pre surgery education and expectation of recovery reviewed with patient and wife. No acute concerns at this time, continue with plan of care

## 2023-12-28 NOTE — PROGRESS NOTES
This RT obtained ABG and asked patient about smoking history. Pt stated he smoked about 2 packs a day for 28 years making his pack year history 56.  Placed pft order

## 2023-12-28 NOTE — LETTER
December 28, 2023       No Recipients    Patient: Kristian Chandra   YOB: 1975   Date of Visit: 12/28/2023     Dear Lam Dickey MD:       Thank you for referring Kristian Chandra to me for evaluation. Below are the relevant portions of my assessment and plan of care.    If you have questions, please do not hesitate to call me. I look forward to following Kristian along with you.         Sincerely,        Jimmy Delgado MD        CC:   No Recipients    Jr Jimmy Delgado MD  12/28/23 1327  Sign when Signing Visit  12/28/2023      Subjective:      Grabiel Olvera MD (Inactive)    Chief Complaint: Chest pain    History of Present Illness:       Dear Grabiel Red MD (Inactive) and Colleagues,  It was nice to see Kristian Chandra in consultation at your request. He is a 48 y.o. male with a history of coronary artery disease who has developed class III angina. He denies heart failure. The ECHO that I reviewed personally shows normal echocardiogram. The Cardiac Cath that I reviewed personally shows multivessel coronary disease with 90% LAD and diagonal.  70% OM1 and OM 3.  80% RCA.  Ejection fraction is 60%.  He stopped taking his Brilinta the last week and is now admitted for surgery..    Patient Active Problem List   Diagnosis   • Chest pain   • CAD (coronary artery disease)   • Tobacco dependence   • Dyslipidemia       Past Medical History:   Diagnosis Date   • Abnormal liver function tests     elevated   • Acute bronchitis    • Asthma, allergic    • Blastomycosis    • Chronic obstructive lung disease    • Chronic persistent hepatitis    • Coronary arteriosclerosis     post stent 2010   • Diabetes mellitus 2023   • Diarrhea    • Dyspnea on exertion    • Epigastric pain    • Essential hypertension    • Generalized abdominal pain    • GERD (gastroesophageal reflux disease)     with esophagitis   • H/O echocardiogram     Normal left ventricular systolic function with EF of 65%.  Concentric left ventricular hypertrophy. 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 Postdilated using a noncompliant balloon [id number] balloon 04/17/2011       • CARDIAC CATHETERIZATION N/A 06/16/2017    Procedure: Left Heart Cath;  Surgeon: Melissa Tineo MD;  [facility];  Service:    • COLONOSCOPY      External and internal hemorrhoids.Medium sized lipoma at the ileocecal valve. Biopsied.Several biopsies obtained in the entire colon and in the terminal ileum. 07/08/2016       • CORONARY STENT PLACEMENT  2017   • ENDOSCOPY      Internal & external hemorrhoids found. 12/21/2011     • ENDOSCOPY      Mildly severe esophagitis. Gastritis. Normal duodenum. Biopsied. Several biopsies obtained in the lower third of the esophagus.Several biopsies obtained in the gastric antrum. 07/08/2016       • [place] RT/LT HEART CATHETERS N/A 06/16/2017    Procedure: Percutaneous Coronary Intervention;  Surgeon: Melissa Tineo MD;  [facility];  Service: Cardiovascular       Allergies   Allergen Reactions   • Wound Dressing Adhesive Dermatitis         Current Outpatient Medications:   •  Aspirin 325 MG capsule, 1 capsule Daily., Disp: , Rfl:   •  atorvastatin (LIPITOR) 40 MG tablet, Take 1 tablet by mouth Every Night., Disp: , Rfl:   •  esomeprazole (nexIUM) 40 MG capsule, Take 1 capsule by mouth., Disp: , Rfl:   •  FREESTYLE LITE test strip, USE 1 STRIP 4 TIMES DAILY, Disp: , Rfl:   •  Lancets (freestyle)  lancets, 3 TIMES A DAY, Disp: , Rfl:   •  lisinopril (PRINIVIL,ZESTRIL) 5 MG tablet, Take 1 tablet by mouth Daily., Disp: , Rfl:   •  loratadine (CLARITIN) 10 MG tablet, Take 1 tablet by mouth., Disp: , Rfl:   •  nitroglycerin (NITROSTAT) 0.4 MG SL tablet, Place 1 tablet under the tongue Every 5 (Five) Minutes As Needed for Chest Pain. Take no more than 3 doses in 15 minutes., Disp: 30 tablet, Rfl: 0    Social History     Socioeconomic History   • Marital status:    Tobacco Use   • Smoking status: Former     Packs/day: 2.00     Years: 15.00     Additional pack years: 0.00     Total pack years: 30.00     Types: Cigarettes     Start date: 1990     Quit date: 6/15/2017     Years since quittin.5   • Smokeless tobacco: Never   Vaping Use   • Vaping Use: Never used   Substance and Sexual Activity   • Alcohol use: No   • Drug use: No   • Sexual activity: Yes     Partners: Female       Family History   Problem Relation Age of Onset   • Hypertension Other            Review of Systems:  Review of Systems   Constitutional:  Positive for activity change and fatigue.   HENT: Negative.     Eyes: Negative.    Respiratory:  Positive for chest tightness.    Cardiovascular:  Positive for chest pain.   Gastrointestinal: Negative.    Endocrine: Negative.    Genitourinary: Negative.    Musculoskeletal: Negative.    Allergic/Immunologic: Negative.    Neurological: Negative.    Hematological: Negative.    Psychiatric/Behavioral: Negative.       Cardiovascular ROS: positive for - chest pain  Physical Exam:    Vital Signs:  Weight: 109 kg (240 lb)   Body mass index is 34.44 kg/m².  Temp: 98 °F (36.7 °C)   Heart Rate: 80   BP: 118/74     Constitutional:       Appearance: Healthy appearance.   Eyes:      Conjunctiva/sclera: Conjunctivae normal.      Pupils: Pupils are equal, round, and reactive to light.   HENT:      Nose: Nose normal.    Mouth/Throat:      Dentition: Normal.      Pharynx: Oropharynx is clear.   Neck:       Vascular: JVD normal.   Pulmonary:      Effort: Pulmonary effort is normal.      Breath sounds: Normal breath sounds.   Chest:      Chest wall: Not tender to palpatation.   Cardiovascular:      PMI at left midclavicular line. Normal rate. Regular rhythm. Normal S1. Normal S2.       Murmurs: There is no murmur.      No gallop.  No click. No rub.   Pulses:     Carotid: 4+ with bruit bilaterally.     Radial: 4+ bilaterally.     Femoral: 4+ bilaterally.     Dorsalis pedis: 4+ bilaterally.     Posterior tibial: 4+ bilaterally.  Edema:     Peripheral edema absent.   Abdominal:      General: Bowel sounds are normal.      Palpations: Abdomen is soft.   Musculoskeletal: Normal range of motion.      Cervical back: Normal range of motion and neck supple. Skin:     General: Skin is warm and dry.   Neurological:      Mental Status: Alert and oriented to person, place and time.          Assessment:     Multivessel coronary disease in a diabetic.  He is young and it is possible we can do to mammary's we will have to see what his A1c records.          Recommendation/Plan:       Admit today for surgery tomorrow.  He has been off his Brilinta but will check his platelet aggregation.  I think will be fine.  I discussed all this with the patient and his new bride.  They were  yesterday.  All questions were answered.        Thank you for allowing me to participate in his care.    Regards,    Jimmy Delgado MD

## 2023-12-28 NOTE — PROGRESS NOTES
12/28/2023      Subjective:      Grabiel Olvera MD (Inactive)    Chief Complaint: Chest pain    History of Present Illness:       Dear Grabiel Red MD (Inactive) and Colleagues,  It was nice to see Kristian Chandra in consultation at your request. He is a 48 y.o. male with a history of coronary artery disease who has developed class III angina. He denies heart failure. The ECHO that I reviewed personally shows normal echocardiogram. The Cardiac Cath that I reviewed personally shows multivessel coronary disease with 90% LAD and diagonal.  70% OM1 and OM 3.  80% RCA.  Ejection fraction is 60%.  He stopped taking his Brilinta the last week and is now admitted for surgery..    Patient Active Problem List   Diagnosis    Chest pain    CAD (coronary artery disease)    Tobacco dependence    Dyslipidemia       Past Medical History:   Diagnosis Date    Abnormal liver function tests     elevated    Acute bronchitis     Asthma, allergic     Blastomycosis     Chronic obstructive lung disease     Chronic persistent hepatitis     Coronary arteriosclerosis     post stent 2010    Diabetes mellitus 2023    Diarrhea     Dyspnea on exertion     Epigastric pain     Essential hypertension     Generalized abdominal pain     GERD (gastroesophageal reflux disease)     with esophagitis    H/O echocardiogram     Normal left ventricular systolic function with EF of 65%. Concentric left ventricular hypertrophy. 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  Postdilated using a noncompliant balloon [id number] balloon 04/17/2011        CARDIAC CATHETERIZATION N/A 06/16/2017    Procedure: Left Heart Cath;  Surgeon: Melissa Tineo MD;  Location: [facility] INVASIVE LOCATION;  Service:     COLONOSCOPY      External and internal hemorrhoids.Medium sized lipoma at the ileocecal valve. Biopsied.Several biopsies obtained in the entire colon and in the terminal ileum. 07/08/2016        CORONARY STENT PLACEMENT  2017    ENDOSCOPY      Internal & external hemorrhoids found. 12/21/2011      ENDOSCOPY      Mildly severe esophagitis. Gastritis. Normal duodenum. Biopsied. Several biopsies obtained in the lower third of the esophagus.Several biopsies obtained in the gastric antrum. 07/08/2016        [place] RT/LT HEART CATHETERS N/A 06/16/2017    Procedure: Percutaneous Coronary Intervention;  Surgeon: Melissa Tineo MD;  Location: [facility] INVASIVE LOCATION;  Service: Cardiovascular       Allergies   Allergen Reactions    Wound Dressing Adhesive Dermatitis         Current Outpatient Medications:     Aspirin 325 MG capsule, 1 capsule Daily., Disp: , Rfl:     atorvastatin (LIPITOR) 40 MG tablet, Take 1 tablet by mouth Every Night., Disp: , Rfl:     esomeprazole (nexIUM) 40 MG capsule, Take 1 capsule by mouth., Disp: , Rfl:     FREESTYLE LITE test strip, USE 1 STRIP 4 TIMES DAILY, Disp: , Rfl:     Lancets (freestyle) lancets, 3 TIMES A DAY, Disp: , Rfl:     lisinopril (PRINIVIL,ZESTRIL) 5 MG tablet, Take 1 tablet by mouth Daily., Disp: , Rfl:     loratadine (CLARITIN) 10 MG tablet, Take 1 tablet by mouth., Disp: , Rfl:     nitroglycerin (NITROSTAT) 0.4 MG SL tablet, Place 1 tablet under the tongue Every 5 (Five) Minutes As Needed for Chest Pain. Take no more than 3 doses in 15 minutes., Disp: 30 tablet, Rfl: 0    Social History     Socioeconomic History    Marital status:    Tobacco Use    Smoking status: Former     Packs/day: 2.00     Years: 15.00     Additional pack years: 0.00      Total pack years: 30.00     Types: Cigarettes     Start date: 1990     Quit date: 6/15/2017     Years since quittin.5    Smokeless tobacco: Never   Vaping Use    Vaping Use: Never used   Substance and Sexual Activity    Alcohol use: No    Drug use: No    Sexual activity: Yes     Partners: Female       Family History   Problem Relation Age of Onset    Hypertension Other            Review of Systems:  Review of Systems   Constitutional:  Positive for activity change and fatigue.   HENT: Negative.     Eyes: Negative.    Respiratory:  Positive for chest tightness.    Cardiovascular:  Positive for chest pain.   Gastrointestinal: Negative.    Endocrine: Negative.    Genitourinary: Negative.    Musculoskeletal: Negative.    Allergic/Immunologic: Negative.    Neurological: Negative.    Hematological: Negative.    Psychiatric/Behavioral: Negative.       Cardiovascular ROS: positive for - chest pain  Physical Exam:    Vital Signs:  Weight: 109 kg (240 lb)   Body mass index is 34.44 kg/m².  Temp: 98 °F (36.7 °C)   Heart Rate: 80   BP: 118/74     Constitutional:       Appearance: Healthy appearance.   Eyes:      Conjunctiva/sclera: Conjunctivae normal.      Pupils: Pupils are equal, round, and reactive to light.   HENT:      Nose: Nose normal.    Mouth/Throat:      Dentition: Normal.      Pharynx: Oropharynx is clear.   Neck:      Vascular: JVD normal.   Pulmonary:      Effort: Pulmonary effort is normal.      Breath sounds: Normal breath sounds.   Chest:      Chest wall: Not tender to palpatation.   Cardiovascular:      PMI at left midclavicular line. Normal rate. Regular rhythm. Normal S1. Normal S2.       Murmurs: There is no murmur.      No gallop.  No click. No rub.   Pulses:     Carotid: 4+ with bruit bilaterally.     Radial: 4+ bilaterally.     Femoral: 4+ bilaterally.     Dorsalis pedis: 4+ bilaterally.     Posterior tibial: 4+ bilaterally.  Edema:     Peripheral edema absent.   Abdominal:      General: Bowel  sounds are normal.      Palpations: Abdomen is soft.   Musculoskeletal: Normal range of motion.      Cervical back: Normal range of motion and neck supple. Skin:     General: Skin is warm and dry.   Neurological:      Mental Status: Alert and oriented to person, place and time.          Assessment:     Multivessel coronary disease in a diabetic.  He is young and it is possible we can do to mammary's we will have to see what his A1c records.          Recommendation/Plan:       Admit today for surgery tomorrow.  He has been off his Brilinta but will check his platelet aggregation.  I think will be fine.  I discussed all this with the patient and his new bride.  They were  yesterday.  All questions were answered.        Thank you for allowing me to participate in his care.    Regards,    Jimmy Delgado MD

## 2023-12-29 ENCOUNTER — ANESTHESIA (OUTPATIENT)
Dept: PERIOP | Facility: HOSPITAL | Age: 48
End: 2023-12-29
Payer: MEDICAID

## 2023-12-29 ENCOUNTER — APPOINTMENT (OUTPATIENT)
Dept: RESPIRATORY THERAPY | Facility: HOSPITAL | Age: 48
End: 2023-12-29
Payer: MEDICAID

## 2023-12-29 ENCOUNTER — ANESTHESIA EVENT (OUTPATIENT)
Dept: PERIOP | Facility: HOSPITAL | Age: 48
End: 2023-12-29
Payer: MEDICAID

## 2023-12-29 ENCOUNTER — APPOINTMENT (OUTPATIENT)
Dept: GENERAL RADIOLOGY | Facility: HOSPITAL | Age: 48
End: 2023-12-29
Payer: MEDICAID

## 2023-12-29 ENCOUNTER — ANCILLARY PROCEDURE (OUTPATIENT)
Dept: PERIOP | Facility: HOSPITAL | Age: 48
End: 2023-12-29
Payer: MEDICAID

## 2023-12-29 LAB
ACT BLD: 136 SECONDS (ref 82–152)
ACT BLD: 163 SECONDS (ref 82–152)
ACT BLD: 396 SECONDS (ref 82–152)
ACT BLD: 412 SECONDS (ref 82–152)
ACT BLD: 433 SECONDS (ref 82–152)
ACT BLD: 455 SECONDS (ref 82–152)
ACT BLD: 504 SECONDS (ref 82–152)
ALBUMIN SERPL-MCNC: 3.8 G/DL (ref 3.5–5.2)
ALBUMIN SERPL-MCNC: 3.9 G/DL (ref 3.5–5.2)
ANION GAP SERPL CALCULATED.3IONS-SCNC: 10.2 MMOL/L (ref 5–15)
ANION GAP SERPL CALCULATED.3IONS-SCNC: 8.9 MMOL/L (ref 5–15)
APTT PPP: 28.1 SECONDS (ref 22.7–35.4)
ARTERIAL PATENCY WRIST A: ABNORMAL
ARTERIAL PATENCY WRIST A: ABNORMAL
ATMOSPHERIC PRESS: 744 MMHG
ATMOSPHERIC PRESS: 744.2 MMHG
BASE EXCESS BLDA CALC-SCNC: -1 MMOL/L (ref -5–5)
BASE EXCESS BLDA CALC-SCNC: -1 MMOL/L (ref 0–2)
BASE EXCESS BLDA CALC-SCNC: -2 MMOL/L (ref -5–5)
BASE EXCESS BLDA CALC-SCNC: -3 MMOL/L (ref -5–5)
BASE EXCESS BLDA CALC-SCNC: -6 MMOL/L (ref -5–5)
BASE EXCESS BLDA CALC-SCNC: -8 MMOL/L (ref -5–5)
BASE EXCESS BLDA CALC-SCNC: 0 MMOL/L (ref -5–5)
BASE EXCESS BLDA CALC-SCNC: 0.7 MMOL/L (ref 0–2)
BASE EXCESS BLDA CALC-SCNC: 2 MMOL/L (ref -5–5)
BASOPHILS # BLD AUTO: 0.06 10*3/MM3 (ref 0–0.2)
BASOPHILS NFR BLD AUTO: 0.4 % (ref 0–1.5)
BDY SITE: ABNORMAL
BDY SITE: ABNORMAL
BUN SERPL-MCNC: 10 MG/DL (ref 6–20)
BUN SERPL-MCNC: 9 MG/DL (ref 6–20)
BUN/CREAT SERPL: 8.7 (ref 7–25)
BUN/CREAT SERPL: 9.3 (ref 7–25)
CA-I BLD-MCNC: 4.5 MG/DL (ref 4.6–5.4)
CA-I BLDA-SCNC: ABNORMAL MMOL/L
CA-I SERPL ISE-MCNC: 1.13 MMOL/L (ref 1.15–1.35)
CALCIUM SPEC-SCNC: 8.3 MG/DL (ref 8.6–10.5)
CALCIUM SPEC-SCNC: 8.6 MG/DL (ref 8.6–10.5)
CHLORIDE SERPL-SCNC: 108 MMOL/L (ref 98–107)
CHLORIDE SERPL-SCNC: 109 MMOL/L (ref 98–107)
CO2 BLDA-SCNC: 20 MMOL/L (ref 24–29)
CO2 BLDA-SCNC: 21 MMOL/L (ref 24–29)
CO2 BLDA-SCNC: 23 MMOL/L (ref 24–29)
CO2 BLDA-SCNC: 26 MMOL/L (ref 24–29)
CO2 BLDA-SCNC: 26.8 MMOL/L (ref 23–27)
CO2 BLDA-SCNC: 26.9 MMOL/L (ref 23–27)
CO2 BLDA-SCNC: 27 MMOL/L (ref 24–29)
CO2 BLDA-SCNC: 27 MMOL/L (ref 24–29)
CO2 BLDA-SCNC: 29 MMOL/L (ref 24–29)
CO2 SERPL-SCNC: 23.1 MMOL/L (ref 22–29)
CO2 SERPL-SCNC: 23.8 MMOL/L (ref 22–29)
CREAT SERPL-MCNC: 1.03 MG/DL (ref 0.76–1.27)
CREAT SERPL-MCNC: 1.07 MG/DL (ref 0.76–1.27)
DEPRECATED RDW RBC AUTO: 45.3 FL (ref 37–54)
DEPRECATED RDW RBC AUTO: 45.9 FL (ref 37–54)
EGFRCR SERPLBLD CKD-EPI 2021: 85.1 ML/MIN/1.73
EGFRCR SERPLBLD CKD-EPI 2021: 89 ML/MIN/1.73
EOSINOPHIL # BLD AUTO: 0.04 10*3/MM3 (ref 0–0.4)
EOSINOPHIL NFR BLD AUTO: 0.3 % (ref 0.3–6.2)
ERYTHROCYTE [DISTWIDTH] IN BLOOD BY AUTOMATED COUNT: 14.3 % (ref 12.3–15.4)
ERYTHROCYTE [DISTWIDTH] IN BLOOD BY AUTOMATED COUNT: 14.4 % (ref 12.3–15.4)
FIBRINOGEN PPP-MCNC: 250 MG/DL (ref 219–464)
GLUCOSE BLDC GLUCOMTR-MCNC: 127 MG/DL (ref 70–130)
GLUCOSE BLDC GLUCOMTR-MCNC: 138 MG/DL (ref 70–130)
GLUCOSE BLDC GLUCOMTR-MCNC: 156 MG/DL (ref 70–130)
GLUCOSE BLDC GLUCOMTR-MCNC: 160 MG/DL (ref 70–130)
GLUCOSE BLDC GLUCOMTR-MCNC: 165 MG/DL (ref 70–130)
GLUCOSE BLDC GLUCOMTR-MCNC: 168 MG/DL (ref 70–130)
GLUCOSE BLDC GLUCOMTR-MCNC: 173 MG/DL (ref 70–130)
GLUCOSE BLDC GLUCOMTR-MCNC: 175 MG/DL (ref 70–130)
GLUCOSE BLDC GLUCOMTR-MCNC: 183 MG/DL (ref 70–130)
GLUCOSE BLDC GLUCOMTR-MCNC: 189 MG/DL (ref 70–130)
GLUCOSE BLDC GLUCOMTR-MCNC: 191 MG/DL (ref 70–130)
GLUCOSE BLDC GLUCOMTR-MCNC: 191 MG/DL (ref 70–130)
GLUCOSE BLDC GLUCOMTR-MCNC: 193 MG/DL (ref 70–130)
GLUCOSE BLDC GLUCOMTR-MCNC: 193 MG/DL (ref 70–130)
GLUCOSE BLDC GLUCOMTR-MCNC: 215 MG/DL (ref 70–130)
GLUCOSE BLDC GLUCOMTR-MCNC: 240 MG/DL (ref 70–130)
GLUCOSE BLDC GLUCOMTR-MCNC: 243 MG/DL (ref 70–130)
GLUCOSE BLDC GLUCOMTR-MCNC: 243 MG/DL (ref 70–130)
GLUCOSE SERPL-MCNC: 184 MG/DL (ref 65–99)
GLUCOSE SERPL-MCNC: 209 MG/DL (ref 65–99)
HCO3 BLDA-SCNC: 18.8 MMOL/L (ref 22–26)
HCO3 BLDA-SCNC: 19.7 MMOL/L (ref 22–26)
HCO3 BLDA-SCNC: 22 MMOL/L (ref 22–26)
HCO3 BLDA-SCNC: 24.5 MMOL/L (ref 22–26)
HCO3 BLDA-SCNC: 24.7 MMOL/L (ref 22–26)
HCO3 BLDA-SCNC: 24.7 MMOL/L (ref 22–26)
HCO3 BLDA-SCNC: 25.3 MMOL/L (ref 22–26)
HCO3 BLDA-SCNC: 25.4 MMOL/L (ref 22–28)
HCO3 BLDA-SCNC: 25.5 MMOL/L (ref 22–28)
HCO3 BLDA-SCNC: 25.9 MMOL/L (ref 22–26)
HCO3 BLDA-SCNC: 27.4 MMOL/L (ref 22–26)
HCT VFR BLD AUTO: 41.1 % (ref 37.5–51)
HCT VFR BLD AUTO: 42.7 % (ref 37.5–51)
HCT VFR BLDA CALC: 32 % (ref 38–51)
HCT VFR BLDA CALC: 33 % (ref 38–51)
HCT VFR BLDA CALC: 34 % (ref 38–51)
HCT VFR BLDA CALC: 35 % (ref 38–51)
HCT VFR BLDA CALC: 40 % (ref 38–51)
HEMODILUTION: NO
HEMODILUTION: NO
HGB BLD-MCNC: 14.3 G/DL (ref 13–17.7)
HGB BLD-MCNC: 15 G/DL (ref 13–17.7)
HGB BLDA-MCNC: 10.9 G/DL (ref 12–17)
HGB BLDA-MCNC: 11.2 G/DL (ref 12–17)
HGB BLDA-MCNC: 11.6 G/DL (ref 12–17)
HGB BLDA-MCNC: 11.9 G/DL (ref 12–17)
HGB BLDA-MCNC: 13.6 G/DL (ref 12–17)
IMM GRANULOCYTES # BLD AUTO: 0.07 10*3/MM3 (ref 0–0.05)
IMM GRANULOCYTES NFR BLD AUTO: 0.5 % (ref 0–0.5)
INHALED O2 CONCENTRATION: 100 %
INHALED O2 CONCENTRATION: 40 %
INR PPP: 1.37 (ref 0.9–1.1)
LYMPHOCYTES # BLD AUTO: 1.12 10*3/MM3 (ref 0.7–3.1)
LYMPHOCYTES NFR BLD AUTO: 7.6 % (ref 19.6–45.3)
MAGNESIUM SERPL-MCNC: 2.3 MG/DL (ref 1.6–2.6)
MAGNESIUM SERPL-MCNC: 2.8 MG/DL (ref 1.6–2.6)
MCH RBC QN AUTO: 30.3 PG (ref 26.6–33)
MCH RBC QN AUTO: 30.7 PG (ref 26.6–33)
MCHC RBC AUTO-ENTMCNC: 34.8 G/DL (ref 31.5–35.7)
MCHC RBC AUTO-ENTMCNC: 35.1 G/DL (ref 31.5–35.7)
MCV RBC AUTO: 87.1 FL (ref 79–97)
MCV RBC AUTO: 87.3 FL (ref 79–97)
MODALITY: ABNORMAL
MODALITY: ABNORMAL
MONOCYTES # BLD AUTO: 1.07 10*3/MM3 (ref 0.1–0.9)
MONOCYTES NFR BLD AUTO: 7.3 % (ref 5–12)
NEUTROPHILS NFR BLD AUTO: 12.29 10*3/MM3 (ref 1.7–7)
NEUTROPHILS NFR BLD AUTO: 83.9 % (ref 42.7–76)
NRBC BLD AUTO-RTO: 0 /100 WBC (ref 0–0.2)
O2 A-A PPRESDIFF RESPIRATORY: 0.2 MMHG
O2 A-A PPRESDIFF RESPIRATORY: 0.5 MMHG
PCO2 BLDA: 38.5 MM HG (ref 35–45)
PCO2 BLDA: 38.7 MM HG (ref 35–45)
PCO2 BLDA: 40.1 MM HG (ref 35–45)
PCO2 BLDA: 40.6 MM HG (ref 35–45)
PCO2 BLDA: 47.8 MM HG (ref 35–45)
PCO2 BLDA: 47.9 MM HG (ref 35–45)
PCO2 BLDA: 49.6 MM HG (ref 35–45)
PCO2 BLDA: 49.7 MM HG (ref 35–45)
PCO2 BLDA: 49.9 MM HG (ref 35–45)
PCO2 BLDA: 51.3 MM HG (ref 35–45)
PCO2 BLDA: 51.4 MM HG (ref 35–45)
PEEP RESPIRATORY: 8 CM[H2O]
PEEP RESPIRATORY: 8 CM[H2O]
PH BLDA: 7.29 PH UNITS (ref 7.35–7.6)
PH BLDA: 7.33 PH UNITS (ref 7.35–7.45)
PH BLDA: 7.33 PH UNITS (ref 7.35–7.6)
PH BLDA: 7.33 PH UNITS (ref 7.35–7.6)
PH BLDA: 7.34 PH UNITS (ref 7.35–7.6)
PH BLDA: 7.36 PH UNITS (ref 7.35–7.6)
PH BLDA: 7.38 PH UNITS (ref 7.35–7.6)
PH BLDA: 7.41 PH UNITS (ref 7.35–7.45)
PHOSPHATE SERPL-MCNC: 1.5 MG/DL (ref 2.5–4.5)
PHOSPHATE SERPL-MCNC: 2.3 MG/DL (ref 2.5–4.5)
PLATELET # BLD AUTO: 192 10*3/MM3 (ref 140–450)
PLATELET # BLD AUTO: 222 10*3/MM3 (ref 140–450)
PMV BLD AUTO: 9.4 FL (ref 6–12)
PMV BLD AUTO: 9.5 FL (ref 6–12)
PO2 BLDA: 106 MMHG (ref 80–105)
PO2 BLDA: 111.4 MM HG (ref 80–100)
PO2 BLDA: 113.5 MM HG (ref 80–100)
PO2 BLDA: 155 MMHG (ref 80–105)
PO2 BLDA: 207 MMHG (ref 80–105)
PO2 BLDA: 345 MMHG (ref 80–105)
PO2 BLDA: 401 MMHG (ref 80–105)
PO2 BLDA: 418 MMHG (ref 80–105)
PO2 BLDA: 43 MMHG (ref 80–105)
PO2 BLDA: 435 MMHG (ref 80–105)
PO2 BLDA: 439 MMHG (ref 80–105)
POTASSIUM BLDA-SCNC: 4 MMOL/L (ref 3.5–4.9)
POTASSIUM BLDA-SCNC: 4.2 MMOL/L (ref 3.5–4.9)
POTASSIUM BLDA-SCNC: 4.2 MMOL/L (ref 3.5–4.9)
POTASSIUM BLDA-SCNC: 5.9 MMOL/L (ref 3.5–4.9)
POTASSIUM BLDA-SCNC: 6.1 MMOL/L (ref 3.5–4.9)
POTASSIUM BLDA-SCNC: 6.1 MMOL/L (ref 3.5–4.9)
POTASSIUM BLDA-SCNC: 6.2 MMOL/L (ref 3.5–4.9)
POTASSIUM BLDA-SCNC: 6.5 MMOL/L (ref 3.5–4.9)
POTASSIUM BLDA-SCNC: 6.5 MMOL/L (ref 3.5–4.9)
POTASSIUM SERPL-SCNC: 4.8 MMOL/L (ref 3.5–5.2)
POTASSIUM SERPL-SCNC: 4.8 MMOL/L (ref 3.5–5.2)
PROTHROMBIN TIME: 17 SECONDS (ref 11.7–14.2)
PSV: 8 CMH2O
QT INTERVAL: 373 MS
QTC INTERVAL: 464 MS
RBC # BLD AUTO: 4.72 10*6/MM3 (ref 4.14–5.8)
RBC # BLD AUTO: 4.89 10*6/MM3 (ref 4.14–5.8)
SAO2 % BLDA: 100 % (ref 95–98)
SAO2 % BLDA: 74 % (ref 95–98)
SAO2 % BLDA: 98 % (ref 95–98)
SAO2 % BLDA: 99 % (ref 95–98)
SAO2 % BLDCOA: 98.1 % (ref 92–98.5)
SAO2 % BLDCOA: 98.4 % (ref 92–98.5)
SET MECH RESP RATE: 15
SET MECH RESP RATE: 22
SODIUM SERPL-SCNC: 141 MMOL/L (ref 136–145)
SODIUM SERPL-SCNC: 142 MMOL/L (ref 136–145)
TOTAL RATE: 15 BREATHS/MINUTE
VENTILATOR MODE: ABNORMAL
VENTILATOR MODE: AC
VT ON VENT VENT: 650 ML
VT ON VENT VENT: 700 ML
WBC NRBC COR # BLD AUTO: 13.69 10*3/MM3 (ref 3.4–10.8)
WBC NRBC COR # BLD AUTO: 14.65 10*3/MM3 (ref 3.4–10.8)

## 2023-12-29 PROCEDURE — 85014 HEMATOCRIT: CPT

## 2023-12-29 PROCEDURE — 82330 ASSAY OF CALCIUM: CPT | Performed by: NURSE PRACTITIONER

## 2023-12-29 PROCEDURE — 25810000003 SODIUM CHLORIDE 0.9 % SOLUTION 250 ML FLEX CONT: Performed by: STUDENT IN AN ORGANIZED HEALTH CARE EDUCATION/TRAINING PROGRAM

## 2023-12-29 PROCEDURE — 33508 ENDOSCOPIC VEIN HARVEST: CPT | Performed by: SPECIALIST/TECHNOLOGIST, OTHER

## 2023-12-29 PROCEDURE — 94010 BREATHING CAPACITY TEST: CPT

## 2023-12-29 PROCEDURE — 63710000001 INSULIN REGULAR HUMAN PER 5 UNITS: Performed by: STUDENT IN AN ORGANIZED HEALTH CARE EDUCATION/TRAINING PROGRAM

## 2023-12-29 PROCEDURE — 33522 CABG ARTERY-VEIN FIVE: CPT | Performed by: SPECIALIST/TECHNOLOGIST, OTHER

## 2023-12-29 PROCEDURE — 33534 CABG ARTERIAL TWO: CPT | Performed by: SPECIALIST/TECHNOLOGIST, OTHER

## 2023-12-29 PROCEDURE — 3E080GC INTRODUCTION OF OTHER THERAPEUTIC SUBSTANCE INTO HEART, OPEN APPROACH: ICD-10-PCS | Performed by: THORACIC SURGERY (CARDIOTHORACIC VASCULAR SURGERY)

## 2023-12-29 PROCEDURE — 25010000002 PROPOFOL 10 MG/ML EMULSION: Performed by: NURSE PRACTITIONER

## 2023-12-29 PROCEDURE — 25010000002 HEPARIN (PORCINE) PER 1000 UNITS: Performed by: STUDENT IN AN ORGANIZED HEALTH CARE EDUCATION/TRAINING PROGRAM

## 2023-12-29 PROCEDURE — 85025 COMPLETE CBC W/AUTO DIFF WBC: CPT | Performed by: NURSE PRACTITIONER

## 2023-12-29 PROCEDURE — 25010000002 MAGNESIUM SULFATE IN D5W 1G/100ML (PREMIX) 1-5 GM/100ML-% SOLUTION: Performed by: NURSE PRACTITIONER

## 2023-12-29 PROCEDURE — 06BQ4ZZ EXCISION OF LEFT SAPHENOUS VEIN, PERCUTANEOUS ENDOSCOPIC APPROACH: ICD-10-PCS | Performed by: THORACIC SURGERY (CARDIOTHORACIC VASCULAR SURGERY)

## 2023-12-29 PROCEDURE — 25010000002 FENTANYL CITRATE (PF) 50 MCG/ML SOLUTION: Performed by: STUDENT IN AN ORGANIZED HEALTH CARE EDUCATION/TRAINING PROGRAM

## 2023-12-29 PROCEDURE — 82803 BLOOD GASES ANY COMBINATION: CPT

## 2023-12-29 PROCEDURE — 85027 COMPLETE CBC AUTOMATED: CPT | Performed by: NURSE PRACTITIONER

## 2023-12-29 PROCEDURE — 86900 BLOOD TYPING SEROLOGIC ABO: CPT

## 2023-12-29 PROCEDURE — 80069 RENAL FUNCTION PANEL: CPT | Performed by: NURSE PRACTITIONER

## 2023-12-29 PROCEDURE — 94799 UNLISTED PULMONARY SVC/PX: CPT

## 2023-12-29 PROCEDURE — 33268 EXCL LAA OPN OTH PX ANY METH: CPT | Performed by: SPECIALIST/TECHNOLOGIST, OTHER

## 2023-12-29 PROCEDURE — 93010 ELECTROCARDIOGRAM REPORT: CPT | Performed by: INTERNAL MEDICINE

## 2023-12-29 PROCEDURE — 25010000002 PAPAVERINE PER 60 MG: Performed by: THORACIC SURGERY (CARDIOTHORACIC VASCULAR SURGERY)

## 2023-12-29 PROCEDURE — 25010000002 HEPARIN (PORCINE) PER 1000 UNITS: Performed by: THORACIC SURGERY (CARDIOTHORACIC VASCULAR SURGERY)

## 2023-12-29 PROCEDURE — 85730 THROMBOPLASTIN TIME PARTIAL: CPT | Performed by: NURSE PRACTITIONER

## 2023-12-29 PROCEDURE — 25010000002 PROPOFOL 10 MG/ML EMULSION: Performed by: STUDENT IN AN ORGANIZED HEALTH CARE EDUCATION/TRAINING PROGRAM

## 2023-12-29 PROCEDURE — 02100Z9 BYPASS CORONARY ARTERY, ONE ARTERY FROM LEFT INTERNAL MAMMARY, OPEN APPROACH: ICD-10-PCS | Performed by: THORACIC SURGERY (CARDIOTHORACIC VASCULAR SURGERY)

## 2023-12-29 PROCEDURE — 33268 EXCL LAA OPN OTH PX ANY METH: CPT | Performed by: THORACIC SURGERY (CARDIOTHORACIC VASCULAR SURGERY)

## 2023-12-29 PROCEDURE — 85018 HEMOGLOBIN: CPT

## 2023-12-29 PROCEDURE — 25010000002 MORPHINE PER 10 MG: Performed by: NURSE PRACTITIONER

## 2023-12-29 PROCEDURE — C1751 CATH, INF, PER/CENT/MIDLINE: HCPCS | Performed by: STUDENT IN AN ORGANIZED HEALTH CARE EDUCATION/TRAINING PROGRAM

## 2023-12-29 PROCEDURE — 33522 CABG ARTERY-VEIN FIVE: CPT | Performed by: THORACIC SURGERY (CARDIOTHORACIC VASCULAR SURGERY)

## 2023-12-29 PROCEDURE — 83735 ASSAY OF MAGNESIUM: CPT | Performed by: NURSE PRACTITIONER

## 2023-12-29 PROCEDURE — 33025 INCISION OF HEART SAC: CPT | Performed by: THORACIC SURGERY (CARDIOTHORACIC VASCULAR SURGERY)

## 2023-12-29 PROCEDURE — 85384 FIBRINOGEN ACTIVITY: CPT | Performed by: NURSE PRACTITIONER

## 2023-12-29 PROCEDURE — 25010000002 ACETAMINOPHEN 10 MG/ML SOLUTION: Performed by: NURSE PRACTITIONER

## 2023-12-29 PROCEDURE — 86901 BLOOD TYPING SEROLOGIC RH(D): CPT

## 2023-12-29 PROCEDURE — 82947 ASSAY GLUCOSE BLOOD QUANT: CPT

## 2023-12-29 PROCEDURE — A4648 IMPLANTABLE TISSUE MARKER: HCPCS | Performed by: THORACIC SURGERY (CARDIOTHORACIC VASCULAR SURGERY)

## 2023-12-29 PROCEDURE — C1713 ANCHOR/SCREW BN/BN,TIS/BN: HCPCS | Performed by: THORACIC SURGERY (CARDIOTHORACIC VASCULAR SURGERY)

## 2023-12-29 PROCEDURE — 71045 X-RAY EXAM CHEST 1 VIEW: CPT

## 2023-12-29 PROCEDURE — 25010000002 CEFAZOLIN IN DEXTROSE 2000 MG/ 100 ML SOLUTION: Performed by: NURSE PRACTITIONER

## 2023-12-29 PROCEDURE — 94002 VENT MGMT INPAT INIT DAY: CPT

## 2023-12-29 PROCEDURE — 85610 PROTHROMBIN TIME: CPT | Performed by: NURSE PRACTITIONER

## 2023-12-29 PROCEDURE — 33534 CABG ARTERIAL TWO: CPT | Performed by: THORACIC SURGERY (CARDIOTHORACIC VASCULAR SURGERY)

## 2023-12-29 PROCEDURE — 82948 REAGENT STRIP/BLOOD GLUCOSE: CPT

## 2023-12-29 PROCEDURE — 33025 INCISION OF HEART SAC: CPT | Performed by: SPECIALIST/TECHNOLOGIST, OTHER

## 2023-12-29 PROCEDURE — 25010000002 CALCIUM GLUCONATE 2-0.675 GM/100ML-% SOLUTION: Performed by: NURSE PRACTITIONER

## 2023-12-29 PROCEDURE — 85347 COAGULATION TIME ACTIVATED: CPT

## 2023-12-29 PROCEDURE — 0 PROTAMINE SULFATE PER 10 MG: Performed by: THORACIC SURGERY (CARDIOTHORACIC VASCULAR SURGERY)

## 2023-12-29 PROCEDURE — 0W9D00Z DRAINAGE OF PERICARDIAL CAVITY WITH DRAINAGE DEVICE, OPEN APPROACH: ICD-10-PCS | Performed by: THORACIC SURGERY (CARDIOTHORACIC VASCULAR SURGERY)

## 2023-12-29 PROCEDURE — 021309W BYPASS CORONARY ARTERY, FOUR OR MORE ARTERIES FROM AORTA WITH AUTOLOGOUS VENOUS TISSUE, OPEN APPROACH: ICD-10-PCS | Performed by: THORACIC SURGERY (CARDIOTHORACIC VASCULAR SURGERY)

## 2023-12-29 PROCEDURE — 25810000003 SODIUM CHLORIDE 0.9 % SOLUTION: Performed by: NURSE PRACTITIONER

## 2023-12-29 PROCEDURE — 25010000002 METOCLOPRAMIDE PER 10 MG: Performed by: NURSE PRACTITIONER

## 2023-12-29 PROCEDURE — 25010000002 CEFAZOLIN IN DEXTROSE 2-4 GM/100ML-% SOLUTION: Performed by: NURSE PRACTITIONER

## 2023-12-29 PROCEDURE — 93005 ELECTROCARDIOGRAM TRACING: CPT | Performed by: NURSE PRACTITIONER

## 2023-12-29 PROCEDURE — 5A1221Z PERFORMANCE OF CARDIAC OUTPUT, CONTINUOUS: ICD-10-PCS | Performed by: THORACIC SURGERY (CARDIOTHORACIC VASCULAR SURGERY)

## 2023-12-29 PROCEDURE — 33508 ENDOSCOPIC VEIN HARVEST: CPT | Performed by: THORACIC SURGERY (CARDIOTHORACIC VASCULAR SURGERY)

## 2023-12-29 PROCEDURE — 25010000002 MAGNESIUM SULFATE PER 500 MG OF MAGNESIUM: Performed by: STUDENT IN AN ORGANIZED HEALTH CARE EDUCATION/TRAINING PROGRAM

## 2023-12-29 PROCEDURE — 25010000002 MIDAZOLAM PER 1 MG: Performed by: STUDENT IN AN ORGANIZED HEALTH CARE EDUCATION/TRAINING PROGRAM

## 2023-12-29 PROCEDURE — 02L70CK OCCLUSION OF LEFT ATRIAL APPENDAGE WITH EXTRALUMINAL DEVICE, OPEN APPROACH: ICD-10-PCS | Performed by: THORACIC SURGERY (CARDIOTHORACIC VASCULAR SURGERY)

## 2023-12-29 PROCEDURE — C1729 CATH, DRAINAGE: HCPCS | Performed by: THORACIC SURGERY (CARDIOTHORACIC VASCULAR SURGERY)

## 2023-12-29 DEVICE — IMPLANTABLE DEVICE: Type: IMPLANTABLE DEVICE | Site: HEART | Status: FUNCTIONAL

## 2023-12-29 DEVICE — HEMOST ABS SURGIFOAM SZ100 8X12 10MM: Type: IMPLANTABLE DEVICE | Site: STERNUM | Status: FUNCTIONAL

## 2023-12-29 DEVICE — STERN FIX SYS ZIPFIX PK/5: Type: IMPLANTABLE DEVICE | Site: STERNUM | Status: FUNCTIONAL

## 2023-12-29 DEVICE — SS SUTURE, 3 PER SLEEVE
Type: IMPLANTABLE DEVICE | Site: STERNUM | Status: FUNCTIONAL
Brand: MYO/WIRE II

## 2023-12-29 RX ORDER — MORPHINE SULFATE 2 MG/ML
1 INJECTION, SOLUTION INTRAMUSCULAR; INTRAVENOUS EVERY 4 HOURS PRN
Status: DISCONTINUED | OUTPATIENT
Start: 2023-12-29 | End: 2024-01-03 | Stop reason: HOSPADM

## 2023-12-29 RX ORDER — CHLORHEXIDINE GLUCONATE ORAL RINSE 1.2 MG/ML
15 SOLUTION DENTAL EVERY 12 HOURS
Status: DISCONTINUED | OUTPATIENT
Start: 2023-12-29 | End: 2024-01-03 | Stop reason: HOSPADM

## 2023-12-29 RX ORDER — NALOXONE HCL 0.4 MG/ML
0.4 VIAL (ML) INJECTION
Status: DISCONTINUED | OUTPATIENT
Start: 2023-12-29 | End: 2024-01-03 | Stop reason: HOSPADM

## 2023-12-29 RX ORDER — MAGNESIUM SULFATE 1 G/100ML
1 INJECTION INTRAVENOUS EVERY 8 HOURS
Status: DISCONTINUED | OUTPATIENT
Start: 2023-12-29 | End: 2023-12-30

## 2023-12-29 RX ORDER — ALPRAZOLAM 0.25 MG/1
0.25 TABLET ORAL EVERY 8 HOURS PRN
Status: DISCONTINUED | OUTPATIENT
Start: 2023-12-29 | End: 2024-01-03 | Stop reason: HOSPADM

## 2023-12-29 RX ORDER — CYCLOBENZAPRINE HCL 10 MG
10 TABLET ORAL EVERY 8 HOURS PRN
Status: DISCONTINUED | OUTPATIENT
Start: 2023-12-30 | End: 2024-01-03 | Stop reason: HOSPADM

## 2023-12-29 RX ORDER — BISACODYL 5 MG/1
10 TABLET, DELAYED RELEASE ORAL DAILY PRN
Status: DISCONTINUED | OUTPATIENT
Start: 2023-12-29 | End: 2024-01-03 | Stop reason: HOSPADM

## 2023-12-29 RX ORDER — AMINOCAPROIC ACID 250 MG/ML
INJECTION, SOLUTION INTRAVENOUS AS NEEDED
Status: DISCONTINUED | OUTPATIENT
Start: 2023-12-29 | End: 2023-12-29 | Stop reason: SURG

## 2023-12-29 RX ORDER — NITROGLYCERIN 0.4 MG/1
0.4 TABLET SUBLINGUAL
Status: DISCONTINUED | OUTPATIENT
Start: 2023-12-29 | End: 2024-01-03 | Stop reason: HOSPADM

## 2023-12-29 RX ORDER — PROPOFOL 10 MG/ML
VIAL (ML) INTRAVENOUS AS NEEDED
Status: DISCONTINUED | OUTPATIENT
Start: 2023-12-29 | End: 2023-12-29 | Stop reason: SURG

## 2023-12-29 RX ORDER — ACETAMINOPHEN 650 MG/1
650 SUPPOSITORY RECTAL EVERY 4 HOURS
Status: ACTIVE | OUTPATIENT
Start: 2023-12-29 | End: 2023-12-30

## 2023-12-29 RX ORDER — PANTOPRAZOLE SODIUM 40 MG/10ML
40 INJECTION, POWDER, LYOPHILIZED, FOR SOLUTION INTRAVENOUS ONCE
Status: COMPLETED | OUTPATIENT
Start: 2023-12-29 | End: 2023-12-29

## 2023-12-29 RX ORDER — CEFAZOLIN SODIUM 2 G/100ML
2 INJECTION, SOLUTION INTRAVENOUS EVERY 8 HOURS
Qty: 500 ML | Refills: 0 | Status: COMPLETED | OUTPATIENT
Start: 2023-12-29 | End: 2023-12-31

## 2023-12-29 RX ORDER — MEPERIDINE HYDROCHLORIDE 25 MG/ML
25 INJECTION INTRAMUSCULAR; INTRAVENOUS; SUBCUTANEOUS EVERY 4 HOURS PRN
Status: ACTIVE | OUTPATIENT
Start: 2023-12-29 | End: 2023-12-29

## 2023-12-29 RX ORDER — ALBUMIN, HUMAN INJ 5% 5 %
1500 SOLUTION INTRAVENOUS AS NEEDED
Status: DISCONTINUED | OUTPATIENT
Start: 2023-12-29 | End: 2023-12-30

## 2023-12-29 RX ORDER — BISACODYL 10 MG
10 SUPPOSITORY, RECTAL RECTAL DAILY PRN
Status: DISCONTINUED | OUTPATIENT
Start: 2023-12-30 | End: 2024-01-03 | Stop reason: HOSPADM

## 2023-12-29 RX ORDER — OXYCODONE HYDROCHLORIDE 5 MG/1
10 TABLET ORAL EVERY 4 HOURS PRN
Status: DISCONTINUED | OUTPATIENT
Start: 2023-12-29 | End: 2024-01-03 | Stop reason: HOSPADM

## 2023-12-29 RX ORDER — ENOXAPARIN SODIUM 100 MG/ML
40 INJECTION SUBCUTANEOUS EVERY 24 HOURS
Status: DISCONTINUED | OUTPATIENT
Start: 2023-12-30 | End: 2024-01-03 | Stop reason: HOSPADM

## 2023-12-29 RX ORDER — DOPAMINE HYDROCHLORIDE 160 MG/100ML
2-20 INJECTION, SOLUTION INTRAVENOUS CONTINUOUS PRN
Status: DISCONTINUED | OUTPATIENT
Start: 2023-12-29 | End: 2023-12-30

## 2023-12-29 RX ORDER — SODIUM CHLORIDE 9 MG/ML
INJECTION, SOLUTION INTRAVENOUS CONTINUOUS PRN
Status: DISCONTINUED | OUTPATIENT
Start: 2023-12-29 | End: 2023-12-29 | Stop reason: SURG

## 2023-12-29 RX ORDER — AMOXICILLIN 250 MG
2 CAPSULE ORAL NIGHTLY
Status: DISCONTINUED | OUTPATIENT
Start: 2023-12-30 | End: 2024-01-03 | Stop reason: HOSPADM

## 2023-12-29 RX ORDER — CALCIUM GLUCONATE 20 MG/ML
2000 INJECTION, SOLUTION INTRAVENOUS ONCE
Status: COMPLETED | OUTPATIENT
Start: 2023-12-29 | End: 2023-12-29

## 2023-12-29 RX ORDER — PAPAVERINE HYDROCHLORIDE 30 MG/ML
INJECTION INTRAMUSCULAR; INTRAVENOUS AS NEEDED
Status: DISCONTINUED | OUTPATIENT
Start: 2023-12-29 | End: 2023-12-29 | Stop reason: HOSPADM

## 2023-12-29 RX ORDER — MIDAZOLAM HYDROCHLORIDE 1 MG/ML
2 INJECTION INTRAMUSCULAR; INTRAVENOUS
Status: DISCONTINUED | OUTPATIENT
Start: 2023-12-29 | End: 2023-12-30

## 2023-12-29 RX ORDER — POLYETHYLENE GLYCOL 3350 17 G/17G
17 POWDER, FOR SOLUTION ORAL DAILY PRN
Status: DISCONTINUED | OUTPATIENT
Start: 2023-12-29 | End: 2024-01-03 | Stop reason: HOSPADM

## 2023-12-29 RX ORDER — ALBUTEROL SULFATE 2.5 MG/3ML
2.5 SOLUTION RESPIRATORY (INHALATION) ONCE AS NEEDED
Status: DISCONTINUED | OUTPATIENT
Start: 2023-12-29 | End: 2023-12-29

## 2023-12-29 RX ORDER — ACETAMINOPHEN 325 MG/1
650 TABLET ORAL EVERY 4 HOURS
Status: ACTIVE | OUTPATIENT
Start: 2023-12-29 | End: 2023-12-30

## 2023-12-29 RX ORDER — CALCIUM GLUCONATE 94 MG/ML
2 INJECTION, SOLUTION INTRAVENOUS ONCE
Status: DISCONTINUED | OUTPATIENT
Start: 2023-12-29 | End: 2023-12-29

## 2023-12-29 RX ORDER — NOREPINEPHRINE BITARTRATE 1 MG/ML
INJECTION, SOLUTION INTRAVENOUS CONTINUOUS PRN
Status: DISCONTINUED | OUTPATIENT
Start: 2023-12-29 | End: 2023-12-29 | Stop reason: SURG

## 2023-12-29 RX ORDER — NOREPINEPHRINE BITARTRATE 0.03 MG/ML
.02-.3 INJECTION, SOLUTION INTRAVENOUS CONTINUOUS PRN
Status: DISCONTINUED | OUTPATIENT
Start: 2023-12-29 | End: 2023-12-30

## 2023-12-29 RX ORDER — MORPHINE SULFATE 2 MG/ML
4 INJECTION, SOLUTION INTRAMUSCULAR; INTRAVENOUS
Status: DISCONTINUED | OUTPATIENT
Start: 2023-12-29 | End: 2023-12-30

## 2023-12-29 RX ORDER — NITROGLYCERIN 20 MG/100ML
5-200 INJECTION INTRAVENOUS
Status: DISCONTINUED | OUTPATIENT
Start: 2023-12-29 | End: 2023-12-30

## 2023-12-29 RX ORDER — PROTAMINE SULFATE 10 MG/ML
INJECTION, SOLUTION INTRAVENOUS AS NEEDED
Status: DISCONTINUED | OUTPATIENT
Start: 2023-12-29 | End: 2023-12-29 | Stop reason: HOSPADM

## 2023-12-29 RX ORDER — MILRINONE LACTATE 0.2 MG/ML
.25-.375 INJECTION, SOLUTION INTRAVENOUS CONTINUOUS PRN
Status: DISCONTINUED | OUTPATIENT
Start: 2023-12-29 | End: 2023-12-30

## 2023-12-29 RX ORDER — MIDAZOLAM HYDROCHLORIDE 1 MG/ML
INJECTION INTRAMUSCULAR; INTRAVENOUS AS NEEDED
Status: DISCONTINUED | OUTPATIENT
Start: 2023-12-29 | End: 2023-12-29 | Stop reason: SURG

## 2023-12-29 RX ORDER — NICOTINE POLACRILEX 4 MG
15 LOZENGE BUCCAL
Status: DISCONTINUED | OUTPATIENT
Start: 2023-12-29 | End: 2023-12-30

## 2023-12-29 RX ORDER — DEXTROSE MONOHYDRATE 25 G/50ML
10-50 INJECTION, SOLUTION INTRAVENOUS
Status: DISCONTINUED | OUTPATIENT
Start: 2023-12-29 | End: 2023-12-30

## 2023-12-29 RX ORDER — FENTANYL CITRATE 50 UG/ML
INJECTION, SOLUTION INTRAMUSCULAR; INTRAVENOUS AS NEEDED
Status: DISCONTINUED | OUTPATIENT
Start: 2023-12-29 | End: 2023-12-29 | Stop reason: SURG

## 2023-12-29 RX ORDER — ATORVASTATIN CALCIUM 20 MG/1
40 TABLET, FILM COATED ORAL NIGHTLY
Status: DISCONTINUED | OUTPATIENT
Start: 2023-12-29 | End: 2024-01-03 | Stop reason: HOSPADM

## 2023-12-29 RX ORDER — ACETAMINOPHEN 10 MG/ML
1000 INJECTION, SOLUTION INTRAVENOUS EVERY 8 HOURS
Status: COMPLETED | OUTPATIENT
Start: 2023-12-29 | End: 2023-12-30

## 2023-12-29 RX ORDER — ACETAMINOPHEN 325 MG/1
650 TABLET ORAL EVERY 4 HOURS PRN
Status: DISCONTINUED | OUTPATIENT
Start: 2023-12-30 | End: 2024-01-03 | Stop reason: HOSPADM

## 2023-12-29 RX ORDER — PHENYLEPHRINE HCL IN 0.9% NACL 0.5 MG/5ML
.2-2 SYRINGE (ML) INTRAVENOUS CONTINUOUS PRN
Status: DISCONTINUED | OUTPATIENT
Start: 2023-12-29 | End: 2023-12-30

## 2023-12-29 RX ORDER — HEPARIN SODIUM 5000 [USP'U]/ML
INJECTION, SOLUTION INTRAVENOUS; SUBCUTANEOUS AS NEEDED
Status: DISCONTINUED | OUTPATIENT
Start: 2023-12-29 | End: 2023-12-29 | Stop reason: HOSPADM

## 2023-12-29 RX ORDER — ROCURONIUM BROMIDE 10 MG/ML
INJECTION, SOLUTION INTRAVENOUS AS NEEDED
Status: DISCONTINUED | OUTPATIENT
Start: 2023-12-29 | End: 2023-12-29 | Stop reason: SURG

## 2023-12-29 RX ORDER — KETAMINE HYDROCHLORIDE 10 MG/ML
INJECTION, SOLUTION INTRAMUSCULAR; INTRAVENOUS AS NEEDED
Status: DISCONTINUED | OUTPATIENT
Start: 2023-12-29 | End: 2023-12-29 | Stop reason: SURG

## 2023-12-29 RX ORDER — IBUPROFEN 600 MG/1
1 TABLET ORAL
Status: DISCONTINUED | OUTPATIENT
Start: 2023-12-29 | End: 2023-12-30

## 2023-12-29 RX ORDER — PANTOPRAZOLE SODIUM 40 MG/1
40 TABLET, DELAYED RELEASE ORAL EVERY MORNING
Status: DISCONTINUED | OUTPATIENT
Start: 2023-12-30 | End: 2024-01-03 | Stop reason: HOSPADM

## 2023-12-29 RX ORDER — ASPIRIN 81 MG/1
81 TABLET ORAL DAILY
Status: DISCONTINUED | OUTPATIENT
Start: 2023-12-30 | End: 2024-01-03 | Stop reason: HOSPADM

## 2023-12-29 RX ORDER — ACETAMINOPHEN 160 MG/5ML
650 SOLUTION ORAL EVERY 4 HOURS
Status: ACTIVE | OUTPATIENT
Start: 2023-12-29 | End: 2023-12-30

## 2023-12-29 RX ORDER — DEXMEDETOMIDINE HYDROCHLORIDE 4 UG/ML
.2-1.5 INJECTION, SOLUTION INTRAVENOUS
Status: DISCONTINUED | OUTPATIENT
Start: 2023-12-29 | End: 2023-12-30

## 2023-12-29 RX ORDER — ONDANSETRON 2 MG/ML
4 INJECTION INTRAMUSCULAR; INTRAVENOUS EVERY 6 HOURS PRN
Status: DISCONTINUED | OUTPATIENT
Start: 2023-12-29 | End: 2024-01-03 | Stop reason: HOSPADM

## 2023-12-29 RX ORDER — ACETAMINOPHEN 650 MG/1
650 SUPPOSITORY RECTAL EVERY 4 HOURS PRN
Status: DISCONTINUED | OUTPATIENT
Start: 2023-12-30 | End: 2024-01-03 | Stop reason: HOSPADM

## 2023-12-29 RX ORDER — HEPARIN SODIUM 1000 [USP'U]/ML
INJECTION, SOLUTION INTRAVENOUS; SUBCUTANEOUS AS NEEDED
Status: DISCONTINUED | OUTPATIENT
Start: 2023-12-29 | End: 2023-12-29 | Stop reason: SURG

## 2023-12-29 RX ORDER — METOCLOPRAMIDE HYDROCHLORIDE 5 MG/ML
10 INJECTION INTRAMUSCULAR; INTRAVENOUS EVERY 6 HOURS
Status: DISCONTINUED | OUTPATIENT
Start: 2023-12-29 | End: 2023-12-30

## 2023-12-29 RX ORDER — HYDROCODONE BITARTRATE AND ACETAMINOPHEN 5; 325 MG/1; MG/1
2 TABLET ORAL EVERY 4 HOURS PRN
Status: DISCONTINUED | OUTPATIENT
Start: 2023-12-29 | End: 2024-01-03 | Stop reason: HOSPADM

## 2023-12-29 RX ORDER — SODIUM CHLORIDE 9 MG/ML
30 INJECTION, SOLUTION INTRAVENOUS CONTINUOUS
Status: DISCONTINUED | OUTPATIENT
Start: 2023-12-29 | End: 2023-12-30

## 2023-12-29 RX ORDER — MAGNESIUM SULFATE HEPTAHYDRATE 500 MG/ML
INJECTION, SOLUTION INTRAMUSCULAR; INTRAVENOUS AS NEEDED
Status: DISCONTINUED | OUTPATIENT
Start: 2023-12-29 | End: 2023-12-29 | Stop reason: SURG

## 2023-12-29 RX ORDER — ACETAMINOPHEN 160 MG/5ML
650 SOLUTION ORAL EVERY 4 HOURS PRN
Status: DISCONTINUED | OUTPATIENT
Start: 2023-12-30 | End: 2024-01-03 | Stop reason: HOSPADM

## 2023-12-29 RX ADMIN — CEFAZOLIN SODIUM 2000 MG: 2 INJECTION, SOLUTION INTRAVENOUS at 11:22

## 2023-12-29 RX ADMIN — PANTOPRAZOLE SODIUM 40 MG: 40 INJECTION, POWDER, FOR SOLUTION INTRAVENOUS at 12:33

## 2023-12-29 RX ADMIN — FENTANYL CITRATE 100 MCG: 50 INJECTION INTRAMUSCULAR; INTRAVENOUS at 11:16

## 2023-12-29 RX ADMIN — HEPARIN SODIUM 30000 UNITS: 1000 INJECTION, SOLUTION INTRAVENOUS; SUBCUTANEOUS at 09:02

## 2023-12-29 RX ADMIN — ROCURONIUM BROMIDE 25 MG: 10 INJECTION, SOLUTION INTRAVENOUS at 09:37

## 2023-12-29 RX ADMIN — MUPIROCIN 1 APPLICATION: 20 OINTMENT TOPICAL at 20:59

## 2023-12-29 RX ADMIN — SODIUM BICARBONATE 50 MEQ: 84 INJECTION, SOLUTION INTRAVENOUS at 08:21

## 2023-12-29 RX ADMIN — ROCURONIUM BROMIDE 25 MG: 10 INJECTION, SOLUTION INTRAVENOUS at 08:16

## 2023-12-29 RX ADMIN — CEFAZOLIN SODIUM 2000 MG: 2 INJECTION, SOLUTION INTRAVENOUS at 07:36

## 2023-12-29 RX ADMIN — FENTANYL CITRATE 100 MCG: 50 INJECTION INTRAMUSCULAR; INTRAVENOUS at 08:09

## 2023-12-29 RX ADMIN — CYCLOBENZAPRINE 10 MG: 10 TABLET, FILM COATED ORAL at 21:00

## 2023-12-29 RX ADMIN — METOPROLOL TARTRATE 12.5 MG: 25 TABLET, FILM COATED ORAL at 06:12

## 2023-12-29 RX ADMIN — ROCURONIUM BROMIDE 30 MG: 10 INJECTION, SOLUTION INTRAVENOUS at 07:54

## 2023-12-29 RX ADMIN — NICARDIPINE HYDROCHLORIDE 15 MG/HR: 25 INJECTION, SOLUTION INTRAVENOUS at 08:05

## 2023-12-29 RX ADMIN — PROPOFOL 50 MCG/KG/MIN: 10 INJECTION, EMULSION INTRAVENOUS at 09:11

## 2023-12-29 RX ADMIN — AMINOCAPROIC ACID 10 G: 250 INJECTION, SOLUTION INTRAVENOUS at 07:58

## 2023-12-29 RX ADMIN — FENTANYL CITRATE 200 MCG: 50 INJECTION INTRAMUSCULAR; INTRAVENOUS at 07:03

## 2023-12-29 RX ADMIN — SODIUM CHLORIDE 0.7 MCG/KG/HR: 9 INJECTION, SOLUTION INTRAVENOUS at 07:53

## 2023-12-29 RX ADMIN — SODIUM CHLORIDE 30 ML/HR: 9 INJECTION, SOLUTION INTRAVENOUS at 12:25

## 2023-12-29 RX ADMIN — FENTANYL CITRATE 100 MCG: 50 INJECTION INTRAMUSCULAR; INTRAVENOUS at 10:07

## 2023-12-29 RX ADMIN — FENTANYL CITRATE 100 MCG: 50 INJECTION INTRAMUSCULAR; INTRAVENOUS at 07:54

## 2023-12-29 RX ADMIN — FENTANYL CITRATE 50 MCG: 50 INJECTION INTRAMUSCULAR; INTRAVENOUS at 09:15

## 2023-12-29 RX ADMIN — PROPOFOL 35 MCG/KG/MIN: 10 INJECTION, EMULSION INTRAVENOUS at 12:33

## 2023-12-29 RX ADMIN — CALCIUM GLUCONATE 2000 MG: 20 INJECTION, SOLUTION INTRAVENOUS at 13:31

## 2023-12-29 RX ADMIN — ALPRAZOLAM 0.25 MG: 0.25 TABLET ORAL at 06:14

## 2023-12-29 RX ADMIN — FENTANYL CITRATE 100 MCG: 50 INJECTION INTRAMUSCULAR; INTRAVENOUS at 08:51

## 2023-12-29 RX ADMIN — ACETAMINOPHEN 1000 MG: 10 INJECTION, SOLUTION INTRAVENOUS at 21:00

## 2023-12-29 RX ADMIN — OXYCODONE HYDROCHLORIDE 10 MG: 5 TABLET ORAL at 17:00

## 2023-12-29 RX ADMIN — ALPRAZOLAM 0.25 MG: 0.25 TABLET ORAL at 21:00

## 2023-12-29 RX ADMIN — ACETAMINOPHEN 1000 MG: 10 INJECTION, SOLUTION INTRAVENOUS at 12:27

## 2023-12-29 RX ADMIN — NOREPINEPHRINE BITARTRATE 0.06 MCG/KG/MIN: 1 SOLUTION INTRAVENOUS at 07:04

## 2023-12-29 RX ADMIN — CEFAZOLIN SODIUM 2 G: 2 INJECTION, SOLUTION INTRAVENOUS at 17:55

## 2023-12-29 RX ADMIN — ROCURONIUM BROMIDE 25 MG: 10 INJECTION, SOLUTION INTRAVENOUS at 08:53

## 2023-12-29 RX ADMIN — AMINOCAPROIC ACID 10 G: 250 INJECTION, SOLUTION INTRAVENOUS at 11:07

## 2023-12-29 RX ADMIN — SODIUM CHLORIDE: 9 INJECTION, SOLUTION INTRAVENOUS at 06:42

## 2023-12-29 RX ADMIN — KETAMINE HYDROCHLORIDE 50 MG: 10 INJECTION INTRAMUSCULAR; INTRAVENOUS at 09:16

## 2023-12-29 RX ADMIN — PROPOFOL 150 MG: 10 INJECTION, EMULSION INTRAVENOUS at 07:03

## 2023-12-29 RX ADMIN — MORPHINE SULFATE 4 MG: 2 INJECTION, SOLUTION INTRAMUSCULAR; INTRAVENOUS at 15:47

## 2023-12-29 RX ADMIN — MORPHINE SULFATE 4 MG: 2 INJECTION, SOLUTION INTRAMUSCULAR; INTRAVENOUS at 14:22

## 2023-12-29 RX ADMIN — DEXMEDETOMIDINE HYDROCHLORIDE IN SODIUM CHLORIDE 0.4 MCG/KG/HR: 4 INJECTION INTRAVENOUS at 13:58

## 2023-12-29 RX ADMIN — ATORVASTATIN CALCIUM 40 MG: 20 TABLET, FILM COATED ORAL at 21:00

## 2023-12-29 RX ADMIN — MAGNESIUM SULFATE HEPTAHYDRATE 2 G: 500 INJECTION, SOLUTION INTRAMUSCULAR; INTRAVENOUS at 10:41

## 2023-12-29 RX ADMIN — KETAMINE HYDROCHLORIDE 40 MG: 10 INJECTION INTRAMUSCULAR; INTRAVENOUS at 08:12

## 2023-12-29 RX ADMIN — SODIUM CHLORIDE 0.4 MCG/KG/HR: 9 INJECTION, SOLUTION INTRAVENOUS at 08:59

## 2023-12-29 RX ADMIN — 0.12% CHLORHEXIDINE GLUCONATE 15 ML: 1.2 RINSE ORAL at 20:59

## 2023-12-29 RX ADMIN — KETAMINE HYDROCHLORIDE 10 MG: 10 INJECTION INTRAMUSCULAR; INTRAVENOUS at 06:52

## 2023-12-29 RX ADMIN — ROCURONIUM BROMIDE 70 MG: 10 INJECTION, SOLUTION INTRAVENOUS at 07:04

## 2023-12-29 RX ADMIN — SODIUM CHLORIDE 3.2 UNITS/HR: 9 INJECTION, SOLUTION INTRAVENOUS at 07:53

## 2023-12-29 RX ADMIN — MIDAZOLAM 2 MG: 1 INJECTION INTRAMUSCULAR; INTRAVENOUS at 06:52

## 2023-12-29 RX ADMIN — METOCLOPRAMIDE HYDROCHLORIDE 10 MG: 5 INJECTION INTRAMUSCULAR; INTRAVENOUS at 17:55

## 2023-12-29 RX ADMIN — DEXMEDETOMIDINE HYDROCHLORIDE IN SODIUM CHLORIDE 1.2 MCG/KG/HR: 4 INJECTION INTRAVENOUS at 17:00

## 2023-12-29 RX ADMIN — MAGNESIUM SULFATE HEPTAHYDRATE 1 G: 1 INJECTION, SOLUTION INTRAVENOUS at 21:00

## 2023-12-29 RX ADMIN — SODIUM CHLORIDE: 9 INJECTION, SOLUTION INTRAVENOUS at 07:05

## 2023-12-29 RX ADMIN — DEXMEDETOMIDINE HYDROCHLORIDE IN SODIUM CHLORIDE 0.5 MCG/KG/HR: 4 INJECTION INTRAVENOUS at 23:09

## 2023-12-29 RX ADMIN — OXYCODONE HYDROCHLORIDE 10 MG: 5 TABLET ORAL at 21:00

## 2023-12-29 NOTE — PAYOR COMM NOTE
"Kristian Chandra (48 y.o. Male)                           ATTENTION; CONTINUED CLINICALS PENDING CASE REF [id number]                            REPLY TO UR DEPT  [phone] OR CALL  BALDEV GRACE LPN           Date of Birth   1975    Social Security Number       Address   [address] [place] [address]    Home Phone   [phone]    MRN   [id number]       Yazdanism   Patient Refused    Marital Status                               Admission Date   12/28/23    Admission Type   Urgent    Admitting Provider   Jr Jimmy Delgado MD    Attending Provider   Jr Jimmy Delgado MD    Department, Room/Bed   [facility] CARDIO RECOVERY, 2203/1       Discharge Date       Discharge Disposition       Discharge Destination                                 Attending Provider: Jr Jimmy Delgado MD    Allergies: Wound Dressing Adhesive    Isolation: None   Infection: None   Code Status: CPR    Ht: 177.8 cm (70\")   Wt: 109 kg (241 lb 2.9 oz)    Admission Cmt: None   Principal Problem: CAD (coronary artery disease) [I25.10]                   Active Insurance as of 12/28/2023       Primary Coverage       Payor Plan Insurance Group Employer/Plan Group    [facility] COMMUNITY PLAN [facility] COMMUNITY PLAN [facility]       Payor Plan Address Payor Plan Phone Number Payor Plan Fax Number Effective Dates    [address]   1/1/2023 - None Entered    [facility] [phone]         Subscriber Name Subscriber Birth Date Member ID       KRISTIAN CHANDRA 1975 [id number]                     Emergency Contacts        (Rel.) Home Phone Work Phone Mobile Phone    PREMAROSAS PLATA (Spouse) [phone] -- [phone]    AdeleRosalinda dumont (Mother) [phone] -- --                 History & Physical        Valentina Ambrocio APRN at 12/28/23 1413       Attestation signed by Jr Jimmy Delgado MD at 12/28/23 1421    See office note.                  H&P " reviewed. The patient was examined and there are no changes to the H&P.          Electronically signed by Jr Jimmy Delgado MD at 12/28/23 1421   Source Note          12/28/2023      Subjective:      Grabiel Olvera MD (Inactive)    Chief Complaint: Chest pain    History of Present Illness:       Dear Grabiel Red MD (Inactive) and Colleagues,  It was nice to see Kristian Chandra in consultation at your request. He is a 48 y.o. male with a history of coronary artery disease who has developed class III angina. He denies heart failure. The ECHO that I reviewed personally shows normal echocardiogram. The Cardiac Cath that I reviewed personally shows multivessel coronary disease with 90% LAD and diagonal.  70% OM1 and OM 3.  80% RCA.  Ejection fraction is 60%.  He stopped taking his Brilinta the last week and is now admitted for surgery..    Patient Active Problem List   Diagnosis    Chest pain    CAD (coronary artery disease)    Tobacco dependence    Dyslipidemia     Past Medical History:   Diagnosis Date    Abnormal liver function tests     elevated    Acute bronchitis     Asthma, allergic     Blastomycosis     Chronic obstructive lung disease     Chronic persistent hepatitis     Coronary arteriosclerosis     post stent 2010    Diabetes mellitus 2023    Diarrhea     Dyspnea on exertion     Epigastric pain     Essential hypertension     Generalized abdominal pain     GERD (gastroesophageal reflux disease)     with esophagitis    H/O echocardiogram     Normal left ventricular systolic function with EF of 65%. Concentric left ventricular hypertrophy. 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 Postdilated using a noncompliant balloon [id number] balloon 04/17/2011        CARDIAC CATHETERIZATION N/A 06/16/2017    Procedure: Left Heart Cath;  Surgeon: Melissa Tineo MD;  Location: [facility] INVASIVE LOCATION;  Service:     COLONOSCOPY      External and internal hemorrhoids.Medium sized lipoma at the ileocecal valve. Biopsied.Several biopsies obtained in the entire colon and in the terminal ileum. 07/08/2016        CORONARY STENT PLACEMENT  2017    ENDOSCOPY      Internal & external hemorrhoids found. 12/21/2011      ENDOSCOPY      Mildly severe esophagitis. Gastritis. Normal duodenum. Biopsied. Several biopsies obtained in the lower third of the esophagus.Several biopsies obtained in the gastric antrum. 07/08/2016        [place] RT/LT HEART CATHETERS N/A 06/16/2017    Procedure: Percutaneous Coronary Intervention;  Surgeon: Melissa Tineo MD;  Location: [facility] INVASIVE LOCATION;  Service: Cardiovascular     Allergies   Allergen Reactions    Wound Dressing Adhesive Dermatitis       Current Outpatient Medications:     Aspirin 325 MG capsule, 1 capsule Daily., Disp: , Rfl:     atorvastatin (LIPITOR) 40 MG tablet, Take 1 tablet by mouth Every Night., Disp: , Rfl:     esomeprazole (nexIUM) 40 MG capsule, Take 1 capsule by mouth., Disp: , Rfl:     FREESTYLE LITE test strip, USE 1 STRIP 4 TIMES DAILY, Disp: , Rfl:     Lancets (freestyle) lancets, 3 TIMES A DAY, Disp: , Rfl:     lisinopril (PRINIVIL,ZESTRIL) 5 MG tablet, Take 1 tablet by mouth Daily., Disp: , Rfl:     loratadine (CLARITIN) 10 MG tablet, Take 1 tablet by mouth., Disp: , Rfl:     nitroglycerin (NITROSTAT) 0.4 MG SL tablet, Place 1 tablet under the tongue Every 5 (Five) Minutes As Needed for Chest Pain. Take no more than 3 doses in 15 minutes., Disp: 30 tablet, Rfl: 0    Social History      Socioeconomic History    Marital status:    Tobacco Use    Smoking status: Former     Packs/day: 2.00     Years: 15.00     Additional pack years: 0.00     Total pack years: 30.00     Types: Cigarettes     Start date: 1990     Quit date: 6/15/2017     Years since quittin.5    Smokeless tobacco: Never   Vaping Use    Vaping Use: Never used   Substance and Sexual Activity    Alcohol use: No    Drug use: No    Sexual activity: Yes     Partners: Female     Family History   Problem Relation Age of Onset    Hypertension Other          Review of Systems:  Review of Systems   Constitutional:  Positive for activity change and fatigue.   HENT: Negative.     Eyes: Negative.    Respiratory:  Positive for chest tightness.    Cardiovascular:  Positive for chest pain.   Gastrointestinal: Negative.    Endocrine: Negative.    Genitourinary: Negative.    Musculoskeletal: Negative.    Allergic/Immunologic: Negative.    Neurological: Negative.    Hematological: Negative.    Psychiatric/Behavioral: Negative.       Cardiovascular ROS: positive for - chest pain  Physical Exam:    Vital Signs:  Weight: 109 kg (240 lb)   Body mass index is 34.44 kg/m².  Temp: 98 °F (36.7 °C)   Heart Rate: 80   BP: 118/74     Constitutional:       Appearance: Healthy appearance.   Eyes:      Conjunctiva/sclera: Conjunctivae normal.      Pupils: Pupils are equal, round, and reactive to light.   HENT:      Nose: Nose normal.    Mouth/Throat:      Dentition: Normal.      Pharynx: Oropharynx is clear.   Neck:      Vascular: JVD normal.   Pulmonary:      Effort: Pulmonary effort is normal.      Breath sounds: Normal breath sounds.   Chest:      Chest wall: Not tender to palpatation.   Cardiovascular:      PMI at left midclavicular line. Normal rate. Regular rhythm. Normal S1. Normal S2.       Murmurs: There is no murmur.      No gallop.  No click. No rub.   Pulses:     Carotid: 4+ with bruit bilaterally.     Radial: 4+ bilaterally.     Femoral:  4+ bilaterally.     Dorsalis pedis: 4+ bilaterally.     Posterior tibial: 4+ bilaterally.  Edema:     Peripheral edema absent.   Abdominal:      General: Bowel sounds are normal.      Palpations: Abdomen is soft.   Musculoskeletal: Normal range of motion.      Cervical back: Normal range of motion and neck supple. Skin:     General: Skin is warm and dry.   Neurological:      Mental Status: Alert and oriented to person, place and time.          Assessment:   Multivessel coronary disease in a diabetic.  He is young and it is possible we can do to mammary's we will have to see what his A1c records.          Recommendation/Plan:     Admit today for surgery tomorrow.  He has been off his Brilinta but will check his platelet aggregation.  I think will be fine.  I discussed all this with the patient and his new bride.  They were  yesterday.  All questions were answered.        Thank you for allowing me to participate in his care.    Regards,    Jimmy Delgado MD    Electronically signed by Jr Jimmy Delgado MD at 12/28/23 1331                 Jr Jimmy Delgado MD at 12/28/23 1315          12/28/2023      Subjective:      Grabiel Olvera MD (Inactive)    Chief Complaint: Chest pain    History of Present Illness:       Dear Grabiel Red MD (Inactive) and Colleagues,  It was nice to see Kristian Chandra in consultation at your request. He is a 48 y.o. male with a history of coronary artery disease who has developed class III angina. He denies heart failure. The ECHO that I reviewed personally shows normal echocardiogram. The Cardiac Cath that I reviewed personally shows multivessel coronary disease with 90% LAD and diagonal.  70% OM1 and OM 3.  80% RCA.  Ejection fraction is 60%.  He stopped taking his Brilinta the last week and is now admitted for surgery..    Patient Active Problem List   Diagnosis    Chest pain    CAD (coronary artery disease)    Tobacco dependence    Dyslipidemia        Past Medical History:   Diagnosis Date    Abnormal liver function tests     elevated    Acute bronchitis     Asthma, allergic     Blastomycosis     Chronic obstructive lung disease     Chronic persistent hepatitis     Coronary arteriosclerosis     post stent 2010    Diabetes mellitus 2023    Diarrhea     Dyspnea on exertion     Epigastric pain     Essential hypertension     Generalized abdominal pain     GERD (gastroesophageal reflux disease)     with esophagitis    H/O echocardiogram     Normal left ventricular systolic function with EF of 65%. Concentric left ventricular hypertrophy. 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 Postdilated using a noncompliant balloon [id number] balloon 04/17/2011        CARDIAC CATHETERIZATION N/A 06/16/2017    Procedure: Left Heart Cath;  Surgeon: Melissa Tineo MD;  Location: [facility] INVASIVE LOCATION;  Service:     COLONOSCOPY      External and internal hemorrhoids.Medium sized lipoma at the ileocecal valve. Biopsied.Several biopsies obtained in the entire colon and in the terminal ileum. 07/08/2016        CORONARY STENT PLACEMENT  2017    ENDOSCOPY      Internal & external hemorrhoids found. 12/21/2011      ENDOSCOPY      Mildly severe esophagitis. Gastritis. Normal duodenum. Biopsied. Several biopsies obtained in the lower third of the esophagus.Several biopsies obtained in the gastric antrum. 07/08/2016        [place] RT/LT HEART CATHETERS N/A 06/16/2017    Procedure: Percutaneous Coronary Intervention;   Surgeon: Melissa Tineo MD;  Location: [facility] INVASIVE LOCATION;  Service: Cardiovascular       Allergies   Allergen Reactions    Wound Dressing Adhesive Dermatitis         Current Outpatient Medications:     Aspirin 325 MG capsule, 1 capsule Daily., Disp: , Rfl:     atorvastatin (LIPITOR) 40 MG tablet, Take 1 tablet by mouth Every Night., Disp: , Rfl:     esomeprazole (nexIUM) 40 MG capsule, Take 1 capsule by mouth., Disp: , Rfl:     FREESTYLE LITE test strip, USE 1 STRIP 4 TIMES DAILY, Disp: , Rfl:     Lancets (freestyle) lancets, 3 TIMES A DAY, Disp: , Rfl:     lisinopril (PRINIVIL,ZESTRIL) 5 MG tablet, Take 1 tablet by mouth Daily., Disp: , Rfl:     loratadine (CLARITIN) 10 MG tablet, Take 1 tablet by mouth., Disp: , Rfl:     nitroglycerin (NITROSTAT) 0.4 MG SL tablet, Place 1 tablet under the tongue Every 5 (Five) Minutes As Needed for Chest Pain. Take no more than 3 doses in 15 minutes., Disp: 30 tablet, Rfl: 0    Social History     Socioeconomic History    Marital status:    Tobacco Use    Smoking status: Former     Packs/day: 2.00     Years: 15.00     Additional pack years: 0.00     Total pack years: 30.00     Types: Cigarettes     Start date: 1990     Quit date: 6/15/2017     Years since quittin.5    Smokeless tobacco: Never   Vaping Use    Vaping Use: Never used   Substance and Sexual Activity    Alcohol use: No    Drug use: No    Sexual activity: Yes     Partners: Female       Family History   Problem Relation Age of Onset    Hypertension Other            Review of Systems:  Review of Systems   Constitutional:  Positive for activity change and fatigue.   HENT: Negative.     Eyes: Negative.    Respiratory:  Positive for chest tightness.    Cardiovascular:  Positive for chest pain.   Gastrointestinal: Negative.    Endocrine: Negative.    Genitourinary: Negative.    Musculoskeletal: Negative.    Allergic/Immunologic: Negative.    Neurological: Negative.    Hematological: Negative.     Psychiatric/Behavioral: Negative.       Cardiovascular ROS: positive for - chest pain  Physical Exam:    Vital Signs:  Weight: 109 kg (240 lb)   Body mass index is 34.44 kg/m².  Temp: 98 °F (36.7 °C)   Heart Rate: 80   BP: 118/74     Constitutional:       Appearance: Healthy appearance.   Eyes:      Conjunctiva/sclera: Conjunctivae normal.      Pupils: Pupils are equal, round, and reactive to light.   HENT:      Nose: Nose normal.    Mouth/Throat:      Dentition: Normal.      Pharynx: Oropharynx is clear.   Neck:      Vascular: JVD normal.   Pulmonary:      Effort: Pulmonary effort is normal.      Breath sounds: Normal breath sounds.   Chest:      Chest wall: Not tender to palpatation.   Cardiovascular:      PMI at left midclavicular line. Normal rate. Regular rhythm. Normal S1. Normal S2.       Murmurs: There is no murmur.      No gallop.  No click. No rub.   Pulses:     Carotid: 4+ with bruit bilaterally.     Radial: 4+ bilaterally.     Femoral: 4+ bilaterally.     Dorsalis pedis: 4+ bilaterally.     Posterior tibial: 4+ bilaterally.  Edema:     Peripheral edema absent.   Abdominal:      General: Bowel sounds are normal.      Palpations: Abdomen is soft.   Musculoskeletal: Normal range of motion.      Cervical back: Normal range of motion and neck supple. Skin:     General: Skin is warm and dry.   Neurological:      Mental Status: Alert and oriented to person, place and time.          Assessment:     Multivessel coronary disease in a diabetic.  He is young and it is possible we can do to mammary's we will have to see what his A1c records.          Recommendation/Plan:       Admit today for surgery tomorrow.  He has been off his Brilinta but will check his platelet aggregation.  I think will be fine.  I discussed all this with the patient and his new bride.  They were  yesterday.  All questions were answered.        Thank you for allowing me to participate in his care.    Regards,    Jimmy Delgado,  MD    Electronically signed by Jr Jimmy Delgado MD at 12/28/23 1331       Vital Signs (last day)       Date/Time Temp Temp src Pulse Resp BP Patient Position SpO2    12/29/23 1205 -- -- 92 15 -- -- 100    12/29/23 0347 98.2 (36.8) Oral 80 18 104/62 Lying 99    12/28/23 1949 98.1 (36.7) Oral 86 18 134/87 Lying 99    12/28/23 1451 98 (36.7) Oral 80 20 128/89 Sitting 97          Oxygen Therapy (last day)       Date/Time SpO2 Device (Oxygen Therapy) Flow (L/min) Oxygen Concentration (%) ETCO2 (mmHg)    12/29/23 1205 100 room air -- -- --    12/29/23 0600 -- room air -- -- --    12/29/23 0409 -- room air -- -- --    12/29/23 0347 99 -- -- -- --    12/29/23 0200 -- room air -- -- --    12/29/23 0012 -- room air -- -- --    12/28/23 2210 -- room air -- -- --    12/28/23 1949 99 -- -- -- --    12/28/23 1451 97 room air -- -- --          Facility-Administered Medications as of 12/29/2023   Medication Dose Route Frequency Provider Last Rate Last Admin    acetaminophen (OFIRMEV) injection 1,000 mg  1,000 mg Intravenous Q8H Vlaentina Ambrocio APRN   1,000 mg at 12/29/23 1227    acetaminophen (TYLENOL) tablet 650 mg  650 mg Oral Q4H Valentina Ambrocio APRN        Or    acetaminophen (TYLENOL) 160 MG/5ML oral solution 650 mg  650 mg Oral Q4H Valentina Ambrocio APRN        Or    acetaminophen (TYLENOL) suppository 650 mg  650 mg Rectal Q4H Valentina Ambrocio APRN        [START ON 12/30/2023] acetaminophen (TYLENOL) tablet 650 mg  650 mg Oral Q4H PRN Valentina Ambrocio APRN        Or    [START ON 12/30/2023] acetaminophen (TYLENOL) 160 MG/5ML oral solution 650 mg  650 mg Oral Q4H PRN Valentina Ambrocio APRN        Or    [START ON 12/30/2023] acetaminophen (TYLENOL) suppository 650 mg  650 mg Rectal Q4H PRN Valentina Ambrocio APRN        albumin human 5 % solution 1,500 mL  1,500 mL Intravenous PRN Valentina Ambrocio APRN        ALPRAZolam (XANAX) tablet 0.25 mg  0.25 mg Oral Q8H PRN Cristóbal  FLORI Roche        [START ON 12/30/2023] aspirin EC tablet 81 mg  81 mg Oral Daily Valentina Ambrocio APRN        atorvastatin (LIPITOR) tablet 40 mg  40 mg Oral Nightly Valentina Ambrocio APRN        bisacodyl (DULCOLAX) EC tablet 10 mg  10 mg Oral Daily PRN Valentina Ambrocio APRN        [START ON 12/30/2023] bisacodyl (DULCOLAX) suppository 10 mg  10 mg Rectal Daily PRN Valentina Ambrocio APRN        ceFAZolin in dextrose (ANCEF) IVPB solution 2 g  2 g Intravenous Q8H Valentina Ambrocio APRN        [COMPLETED] ceFAZolin in dextrose (ANCEF) IVPB solution 2,000 mg  2,000 mg Intravenous On Call to OR Valentina Ambrocio APRN   2,000 mg at 12/29/23 1122    chlorhexidine (PERIDEX) 0.12 % solution 15 mL  15 mL Mouth/Throat Q12H Valentina Ambrocio APRN        clevidipine (CLEVIPREX) infusion 0.5 mg/mL  2-32 mg/hr Intravenous Continuous PRN Valentina Ambrocio APRN        [START ON 12/30/2023] cyclobenzaprine (FLEXERIL) tablet 10 mg  10 mg Oral Q8H PRN Valentina Ambrocio APRN        dexmedetomidine (PRECEDEX) 400 mcg in 100 mL NS infusion  0.2-1.5 mcg/kg/hr Intravenous Titrated Valentina Ambrocio APRN 10.9 mL/hr at 12/29/23 1224 0.4 mcg/kg/hr at 12/29/23 1224    dextrose (D50W) (25 g/50 mL) IV injection 10-50 mL  10-50 mL Intravenous Q15 Min PRN Valentina Ambrocio APRN        dextrose (GLUTOSE) oral gel 15 g  15 g Oral Q15 Min PRN Valentina Ambrocio APRN        DOPamine 400 mg in 250 mL D5W infusion  2-20 mcg/kg/min Intravenous Continuous PRN Valentina Ambrocio APRN        [START ON 12/30/2023] Enoxaparin Sodium (LOVENOX) syringe 40 mg  40 mg Subcutaneous Q24H Valentina Ambrocio APRN        EPINEPHrine 5 mg in 250 mL NS infusion  0.02-0.2 mcg/kg/min Intravenous Continuous PRN Valentina Ambrocio APRN        glucagon (GLUCAGEN) injection 1 mg  1 mg Intramuscular Q15 Min PRN Valentina Ambrocio APRN        HYDROcodone-acetaminophen (NORCO) 5-325 MG per tablet 2  tablet  2 tablet Oral Q4H PRN Valentina Ambrocio APRN        insulin regular 1 unit/mL in 0.9% sodium chloride (Glucommander)  0-100 Units/hr Intravenous Titrated Valentina Ambrocio APRN        [START ON 12/30/2023] magnesium hydroxide (MILK OF MAGNESIA) suspension 10 mL  10 mL Oral Daily PRN Valentina Ambrocio APRN        magnesium sulfate in D5W 1g/100mL (PREMIX)  1 g Intravenous Q8H Valentina Ambrocio APRN        meperidine (DEMEROL) injection 25 mg  25 mg Intravenous Q4H PRN Valentina Ambrocio APRN        metoclopramide (REGLAN) injection 10 mg  10 mg Intravenous Q6H Valentina Ambrocio APRN        [COMPLETED] metoprolol tartrate (LOPRESSOR) tablet 12.5 mg  12.5 mg Oral On Call to OR Valentina Ambrocio APRN   12.5 mg at 12/29/23 0612    [START ON 12/30/2023] metoprolol tartrate (LOPRESSOR) tablet 12.5 mg  12.5 mg Oral Q12H Valentina Ambrocio APRN        midazolam (VERSED) injection 2 mg  2 mg Intravenous Q1H PRN Valentina Ambrocio APRN        milrinone (PRIMACOR) 20 mg in 100 mL D5W infusion  0.25-0.375 mcg/kg/min Intravenous Continuous PRN Valentina Ambrocio APRN        morphine injection 1 mg  1 mg Intravenous Q4H PRN Valentina Ambrocio APRN        And    naloxone (NARCAN) injection 0.4 mg  0.4 mg Intravenous Q5 Min PRN Valentina Ambrocio APRN        morphine injection 4 mg  4 mg Intravenous Q30 Min PRN Valentina Ambrocio APRN        mupirocin (BACTROBAN) 2 % nasal ointment   Each Nare BID Valentina Ambrocio APRN        niCARdipine (CARDENE) 25 mg in 250 mL NS infusion kit  5-15 mg/hr Intravenous Continuous PRN Valentina Ambrocio APRN        nitroglycerin (NITROSTAT) SL tablet 0.4 mg  0.4 mg Sublingual Q5 Min PRN Valentina Ambrocio APRN        nitroglycerin (TRIDIL) 200 mcg/ml infusion  5-200 mcg/min Intravenous Titrated Valentina Ambrocio APRN        norepinephrine (LEVOPHED) 8 mg in 250 mL NS infusion (premix)  0.02-0.3 mcg/kg/min Intravenous  Continuous PRN Valentina Ambrocio APRN        ondansetron (ZOFRAN) injection 4 mg  4 mg Intravenous Q6H PRN Valentina Ambrocio APRN        oxyCODONE (ROXICODONE) immediate release tablet 10 mg  10 mg Oral Q4H PRN Valentina Ambrocio APRN        pantoprazole (PROTONIX) injection 40 mg  40 mg Intravenous Once Valentina Ambrocio APRN        Followed by    [START ON 12/30/2023] pantoprazole (PROTONIX) EC tablet 40 mg  40 mg Oral QAM Valentina Ambrocio APRN        phenylephrine (IRA-SYNEPHRINE) 50 mg in 250 mL NS infusion  0.2-2 mcg/kg/min Intravenous Continuous PRN Valentina Ambrocio APRN        polyethylene glycol (MIRALAX) packet 17 g  17 g Oral Daily PRN Valentina Ambrocio APRN        Potassium Replacement - Follow Nurse / BPA Driven Protocol   Does not apply PRN Valentina Ambrocio APRN        propofol (DIPRIVAN) infusion 10 mg/mL 100 mL  5-50 mcg/kg/min Intravenous Continuous PRN Valentina Ambrocio APRN        [START ON 12/30/2023] sennosides-docusate (PERICOLACE) 8.6-50 MG per tablet 2 tablet  2 tablet Oral Nightly Valentina Ambrocio APRN        sodium chloride 0.9 % infusion  30 mL/hr Intravenous Continuous Valentina Ambrocio APRN 30 mL/hr at 12/29/23 1225 30 mL/hr at 12/29/23 1225     Orders (last 24 hrs)        Start     Ordered    01/01/24 0600  Wound Care  Daily       12/29/23 1157    01/01/24 0600  XR Chest PA & Lateral  1 Time Imaging         12/29/23 1157    12/31/23 1200  Remove Midsternal Dressing on POD 3. Patient May Shower With Dressing On. If Dressing Becomes Loose or Wet Remove on POD 2  Once         12/29/23 1157    12/31/23 1200  Leave Incisions Open to Air Unless Draining or Edges Are Not Approximated  Continuous         12/29/23 1157    12/31/23 0600  Change Chest Dressing Daily While Intubated  Daily       12/29/23 1157    12/31/23 0600  Wound Care  Daily       12/29/23 1157    12/31/23 0600  Wound Care  Daily       12/29/23 1157    12/31/23 0600  CBC  (No Diff)  Daily       12/29/23 1157    12/31/23 0600  Basic Metabolic Panel  Daily       12/29/23 1157    12/30/23 2100  sennosides-docusate (PERICOLACE) 8.6-50 MG per tablet 2 tablet  Nightly         12/29/23 1157    12/30/23 1800  Enoxaparin Sodium (LOVENOX) syringe 40 mg  Every 24 Hours         12/29/23 1157    12/30/23 1400  Intake & Output  Every Shift       12/29/23 1157    12/30/23 1200  Vital Signs  Every 4 Hours      Comments: Perform Vitals Less Frequently Only if Patient Stable      12/29/23 1157    12/30/23 0900  aspirin EC tablet 81 mg  Daily         12/29/23 1157    12/30/23 0900  metoprolol tartrate (LOPRESSOR) tablet 12.5 mg  Every 12 Hours Scheduled         12/29/23 1157    12/30/23 0800  Wean & Extubate Per Rapid Wean and Extubation Protocol  Every Morning       12/29/23 1157    12/30/23 0800  Initiate & Follow Rapid Wean & Extubate Protocol  Every Morning      Comments: Wean and extubate per protocol.    12/29/23 1157    12/30/23 0700  pantoprazole (PROTONIX) EC tablet 40 mg  Every Morning        See Hypers[facility] for full Linked Orders Report.    12/29/23 1157    12/30/23 0600  XR Chest 1 View  Daily       12/29/23 1157    12/30/23 0600  ECG 12 Lead Other; post op open heart  Daily       12/29/23 1157    12/30/23 0600  RN to Place Order For STAT Chest X-Ray When Chest Tubes Are Removed  Once        Comments: No chest X-ray needed for chest tubes with bulb drain    12/29/23 1157    12/30/23 0315  acetaminophen (TYLENOL) tablet 650 mg  Every 4 Hours PRN        See Hyperspace for full Linked Orders Report.    12/29/23 1157    12/30/23 0315  acetaminophen (TYLENOL) 160 MG/5ML oral solution 650 mg  Every 4 Hours PRN        See Hyperspace for full Linked Orders Report.    12/29/23 1157    12/30/23 0315  acetaminophen (TYLENOL) suppository 650 mg  Every 4 Hours PRN        See Hyperspace for full Linked Orders Report.    12/29/23 1157    12/30/23 0300  Calcium, Ionized  Once         12/29/23 1150     12/30/23 0300  CBC & Differential  Once         12/29/23 1157    12/30/23 0300  Renal Function Panel  Once         12/29/23 1157    12/30/23 0300  Magnesium  Once         12/29/23 1157    12/30/23 0300  Protime-INR  Once         12/29/23 1157    12/30/23 0000  bisacodyl (DULCOLAX) suppository 10 mg  Daily PRN         12/29/23 1157    12/30/23 0000  magnesium hydroxide (MILK OF MAGNESIA) suspension 10 mL  Daily PRN         12/29/23 1157    12/30/23 0000  cyclobenzaprine (FLEXERIL) tablet 10 mg  Every 8 Hours PRN         12/29/23 1157    12/29/23 2330  Patient May Use Home CPAP / BIPAP For Sleep  At Bedtime - RT       12/29/23 1157    12/29/23 2100  mupirocin (BACTROBAN) 2 % nasal ointment  2 Times Daily         12/29/23 1157    12/29/23 2100  chlorhexidine (PERIDEX) 0.12 % solution 15 mL  Every 12 Hours         12/29/23 1157    12/29/23 2100  atorvastatin (LIPITOR) tablet 40 mg  Nightly         12/29/23 1157    12/29/23 1900  ceFAZolin in dextrose (ANCEF) IVPB solution 2 g  Every 8 Hours         12/29/23 1157    12/29/23 1300  Ambulate Patient  3 Times Daily         12/29/23 1157    12/29/23 1245  sodium chloride 0.9 % infusion  Continuous         12/29/23 1157    12/29/23 1245  nitroglycerin (TRIDIL) 200 mcg/ml infusion  Titrated         12/29/23 1157    12/29/23 1245  insulin regular 1 unit/mL in 0.9% sodium chloride (Glucommander)  Titrated        Note to Pharmacy: Upon initiation of Glucommander insulin drip, make sure all other insulin orders and anti-diabetic medications have been discontinued.    12/29/23 1157    12/29/23 1245  dexmedetomidine (PRECEDEX) 400 mcg in 100 mL NS infusion  Titrated         12/29/23 1157    12/29/23 1245  metoclopramide (REGLAN) injection 10 mg  Every 6 Hours         12/29/23 1157    12/29/23 1245  acetaminophen (OFIRMEV) injection 1,000 mg  Every 8 Hours         12/29/23 1157    12/29/23 1245  magnesium sulfate in D5W 1g/100mL (PREMIX)  Every 8 Hours         12/29/23 1150     12/29/23 1213  Blood Gas, Arterial -  PROCEDURE ONCE         12/29/23 1208    12/29/23 1207  POC Glucose Once  PROCEDURE ONCE        Comments: Complete no more than 45 minutes prior to patient eating      12/29/23 1205    12/29/23 1200  Vital Signs Every Hour Until 24 Hours Post Op  Every Hour      Comments: Perform Vitals Less Frequently Only if Patient Stable      12/29/23 1157    12/29/23 1200  Check Peripheral Pulses Every 1 Hour x4  Every Hour       12/29/23 1157    12/29/23 1200  Check Peripheral Pulses Every 4 Hours  Every 4 Hours       12/29/23 1157    12/29/23 1200  Intake & Output Every Hour Until 24 Hours Post Op  Every Hour       12/29/23 1157    12/29/23 1200  Cardiac Output Parameters On Admission, Then Every 1 Hour x4  Every Hour       12/29/23 1157    12/29/23 1200  Cardiac Output Parameters Every 2 Hours Until 24 Hours Post Op  Every 2 Hours       12/29/23 1157    12/29/23 1200  CBC (No Diff)  Every 4 Hours       12/29/23 1157    12/29/23 1158  Transfer Patient  Once         12/29/23 1157    12/29/23 1158  Vital Signs & Post-Op Checks Every 15 Minutes x2, Every 30 Minutes x4  Per Hospital Policy        Comments: Perform Vitals Less Frequently Only if Patient Stable    12/29/23 1157    12/29/23 1158  Daily Weights  Daily       12/29/23 1157    12/29/23 1158  Intake & Output Every 15 Minutes x2, Every 30 Minutes x4  Every Shift         12/29/23 1157    12/29/23 1158  Continuous Cardiac Monitoring  Continuous        Comments: Follow Standing Orders As Outlined in Process Instructions (Open Order Report to View Full Instructions)    12/29/23 1157    12/29/23 1158  Telemetry - Maintain IV Access  Continuous         12/29/23 1157    12/29/23 1158  Telemetry - Place Orders & Notify Provider of Results When Patient Experiences Acute Chest Pain, Dysrhythmia or Respiratory Distress  Until Discontinued         12/29/23 1157    12/29/23 1158  May Be Off Telemetry for Tests  Continuous         12/29/23 1157     12/29/23 1158  Continuous Pulse Oximetry  Continuous         12/29/23 1157    12/29/23 1158  Discontinue NG After Extubation  Once         12/29/23 1157    12/29/23 1158  Continue Indwelling Urinary Catheter  Once        See [facility] for full Linked Orders Report.    12/29/23 1157    12/29/23 1158  Assess Need for Indwelling Urinary Catheter - Follow Removal Protocol  Continuous        Comments: Indwelling Urinary Catheter Removal Criteria  Discontinue Indwelling Urinary Catheter Unless One of the Following is Present  Urinary Retention or Obstruction  Chronic Vasquez Catheter Use  End of Life  Critical Illness with Strict I/O   Tract or Abdominal Surgery  Stage 3/4 Sacral / Perineal Wound  Required Activity Restriction: Trauma  Required Activity Restriction: Spine Surgery  If Patient is Being Followed by Urology Contact Them PRIOR to Removal  Do Not Remove Indwelling Urinary Catheter Order is Present with a CLINICAL REASON to Maintain the Catheter. Provider is Required to Include a Clinical Reason to Maintain a Urinary Catheter    Chronic Vasquez Catheter Use (Present on Admission)  Assess for Continued Need & Document Medical Necessity  If Infection is Suspected, Contact the Provider       See [facility] for full Linked Orders Report.    12/29/23 1157    12/29/23 1158  Chest & Mediastinal Tubes to 20cm Continuous Suction  Once         12/29/23 1157    12/29/23 1158  Begin Rewarming with Thermal Unit As Needed For Temp Less Than 96F  Once         12/29/23 1157    12/29/23 1158  ACE Wraps to Vein Comfort Donor Site for 24 Hours, Then Apply LUNA Hose  Continuous         12/29/23 1157    12/29/23 1158  Heart Hugger Vest  Continuous         12/29/23 1157    12/29/23 1158  Monitor QTc  Every Shift       12/29/23 1157    12/29/23 1158  Notify Provider - QTc 500 milliseconds or Greater  Until Discontinued         12/29/23 1157    12/29/23 1158  Wound Dressing  Continuous         12/29/23 1157    12/29/23 1158  Wound  Dressing  Continuous         12/29/23 1157    12/29/23 1158  Notify Surgeon if Drainage From Surgical Incision Site  Until Discontinued         12/29/23 1157    12/29/23 1158  ISOLATE PACER WIRES  Continuous         12/29/23 1157    12/29/23 1158  Turn Patient Every 2 Hours or Begin Bed Rotation Once Hemodynamically Stable  Now Then Every 2 Hours         12/29/23 1157    12/29/23 1158  Advance PROM to AROM  Now Then Every 4 Hours         12/29/23 1157    12/29/23 1158  Up in Chair for Meals  Until Discontinued         12/29/23 1157    12/29/23 1158  Notify Provider - Urine Output  Until Discontinued         12/29/23 1157    12/29/23 1158  Notify Provider - Chest Tube Output  Until Discontinued         12/29/23 1157    12/29/23 1158  Cardiac Sternal Precautions - Maintain at All Times & Teach Patient  Continuous        Comments: Maintain Sternal Precautions at All Times & Teach Patient    12/29/23 1157    12/29/23 1158  Initial Ventilator Settings Per Pulmonologist / Anesthesiologist  Once         12/29/23 1157    12/29/23 1158  Incentive Spirometry  Every Hour While Awake       12/29/23 1157    12/29/23 1158  EZ-Pap  Once         12/29/23 1157    12/29/23 1158  XR Chest 1 View  1 Time Imaging         12/29/23 1157    12/29/23 1158  ECG 12 Lead Other; post op open heart  STAT         12/29/23 1157    12/29/23 1158  Protime-INR  STAT         12/29/23 1157    12/29/23 1158  aPTT  STAT         12/29/23 1157    12/29/23 1158  Calcium, Ionized  STAT         12/29/23 1157    12/29/23 1158  Blood Gas, Arterial -  STAT         12/29/23 1157    12/29/23 1158  CBC & Differential  STAT         12/29/23 1157    12/29/23 1158  Fibrinogen  STAT         12/29/23 1157    12/29/23 1158  Renal Function Panel  Now Then Every 4 Hours       12/29/23 1157    12/29/23 1158  Magnesium  Now Then Every 4 Hours       12/29/23 1157    12/29/23 1158  Potassium  Now Then Every 4 Hours       12/29/23 1157    12/29/23 1158  Advance Diet As  Tolerated -  Until Discontinued         12/29/23 1157 12/29/23 1158  Cardiac Rehab Evaluation and Enrollment  Once        Provider:  (Not yet assigned)    12/29/23 1157    12/29/23 1158  Consult to Case Management /   Once        Provider:  (Not yet assigned)    12/29/23 1157    12/29/23 1158  PT Consult: Eval & Treat  Once         12/29/23 1157    12/29/23 1158  Place Sequential Compression Device  Once         12/29/23 1157 12/29/23 1158  Maintain Sequential Compression Device  Continuous         12/29/23 1157 12/29/23 1158  RN to Release PRN POC Glucose orders as per Glucommander  Continuous        Comments: Glucommander recommended POC testing will vary between 15 minutes and 2 hours. Release PRN POC Glucose orders as needed.    12/29/23 1157 12/29/23 1158  RN to Order STAT Glucose for BG less than 10 mg/dl or greater than 600 mg/dl  Continuous        Comments: If blood glucose reading is less than 10 mg/dl or greater than 600 mg/dl, obtain STAT glucose by Lab. Do not delay treatment of unstable patient in order to obtain glucose sample from Lab. Inform physician of results.    12/29/23 1157 12/29/23 1158  Prior to initiating the Glucommander™, ensure all prior insulin orders are discontinued.  Once        Comments: Prior to initiating the Glucommander™, ensure all prior insulin orders are discontinued.    12/29/23 1157 12/29/23 1158  PRIOR to START of Intravenous Insulin Infusion, Notify Provider if K+ is Less Than 3.3, for Extra Potassium Orders, if Indicated. Do Not Start Insulin Drip if K+ Less Than 3.3.  Per Order Details         12/29/23 1157    12/29/23 1158  Use a Dedicated Line for Insulin Infusion (If Possible).  May Use a Carrier Fluid of NS at KVO Rate if Insulin Rate is Insufficient to Maintain IV Patency.  Prime IV Line With Insulin Infusion  Continuous         12/29/23 1157 12/29/23 1158  If insulin infusion is discontinued, Glucommander must also be  discontinued. If the insulin infusion is re-ordered, you must discontinue previous settings in Glucommander and start new.  Per Order Details        Comments: If insulin infusion is discontinued, Glucommander must also be discontinued. If the insulin infusion is re-ordered, you must discontinue previous settings in Glucommander and start new.    12/29/23 1157    12/29/23 1158  Notify Provider  Until Discontinued         12/29/23 1157    12/29/23 1158  If patient is being fed a diet or bolus tube feeds, utilize the start meal feature / meal bolus feature in Glucommander.  Continuous        Comments: If patient is being fed a diet or bolus tube feeds, utilize the start meal feature / meal bolus feature in Glucommander.    12/29/23 1157    12/29/23 1158  NPO Diet NPO Type: Sips with Meds  Diet Effective Now         12/29/23 1157    12/29/23 1158  Target Arousal Level RASS -1 to -2  Continuous         12/29/23 1157    12/29/23 1158  CBC Auto Differential  PROCEDURE ONCE         12/29/23 1157    12/29/23 1157  Urinary Catheter Care  Every Shift      See Hyperspace for full Linked Orders Report.    12/29/23 1157    12/29/23 1157  acetaminophen (TYLENOL) tablet 650 mg  Every 4 Hours        See Hyperspace for full Linked Orders Report.    12/29/23 1157    12/29/23 1157  acetaminophen (TYLENOL) 160 MG/5ML oral solution 650 mg  Every 4 Hours        See Hyperspace for full Linked Orders Report.    12/29/23 1157    12/29/23 1157  acetaminophen (TYLENOL) suppository 650 mg  Every 4 Hours        See Hyperspace for full Linked Orders Report.    12/29/23 1157    12/29/23 1157  morphine injection 1 mg  Every 4 Hours PRN        See Hyperspace for full Linked Orders Report.    12/29/23 1157    12/29/23 1157  naloxone (NARCAN) injection 0.4 mg  Every 5 Minutes PRN        See Hyperspace for full Linked Orders Report.    12/29/23 1157    12/29/23 1157  bisacodyl (DULCOLAX) EC tablet 10 mg  Daily PRN         12/29/23 1157    12/29/23 1157   polyethylene glycol (MIRALAX) packet 17 g  Daily PRN         12/29/23 1157    12/29/23 1157  ondansetron (ZOFRAN) injection 4 mg  Every 6 Hours PRN         12/29/23 1157    12/29/23 1157  midazolam (VERSED) injection 2 mg  Every 1 Hour PRN         12/29/23 1157    12/29/23 1157  ALPRAZolam (XANAX) tablet 0.25 mg  Every 8 Hours PRN         12/29/23 1157    12/29/23 1157  pantoprazole (PROTONIX) injection 40 mg  Once        See Hyperspace for full Linked Orders Report.    12/29/23 1157    12/29/23 1157  DOPamine 400 mg in 250 mL D5W infusion  Continuous PRN         12/29/23 1157    12/29/23 1157  phenylephrine (IRA-SYNEPHRINE) 50 mg in 250 mL NS infusion  Continuous PRN         12/29/23 1157    12/29/23 1157  niCARdipine (CARDENE) 25 mg in 250 mL NS infusion kit  Continuous PRN         12/29/23 1157    12/29/23 1157  norepinephrine (LEVOPHED) 8 mg in 250 mL NS infusion (premix)  Continuous PRN         12/29/23 1157    12/29/23 1157  clevidipine (CLEVIPREX) infusion 0.5 mg/mL  Continuous PRN         12/29/23 1157    12/29/23 1157  EPINEPHrine 5 mg in 250 mL NS infusion  Continuous PRN         12/29/23 1157    12/29/23 1157  milrinone (PRIMACOR) 20 mg in 100 mL D5W infusion  Continuous PRN         12/29/23 1157    12/29/23 1157  HYDROcodone-acetaminophen (NORCO) 5-325 MG per tablet 2 tablet  Every 4 Hours PRN         12/29/23 1157    12/29/23 1157  oxyCODONE (ROXICODONE) immediate release tablet 10 mg  Every 4 Hours PRN         12/29/23 1157    12/29/23 1157  morphine injection 4 mg  Every 30 Minutes PRN         12/29/23 1157    12/29/23 1157  dextrose (GLUTOSE) oral gel 15 g  Every 15 Minutes PRN         12/29/23 1157    12/29/23 1157  dextrose (D50W) (25 g/50 mL) IV injection 10-50 mL  Every 15 Minutes PRN         12/29/23 1157    12/29/23 1157  glucagon (GLUCAGEN) injection 1 mg  Every 15 Minutes PRN         12/29/23 1157    12/29/23 1157  albumin human 5 % solution 1,500 mL  As Needed         12/29/23 1157     12/29/23 1157  propofol (DIPRIVAN) infusion 10 mg/mL 100 mL  Continuous PRN         12/29/23 1157    12/29/23 1157  meperidine (DEMEROL) injection 25 mg  Every 4 Hours PRN         12/29/23 1157    12/29/23 1157  Potassium Replacement - Follow Nurse / BPA Driven Protocol  As Needed         12/29/23 1157    12/29/23 1157  nitroglycerin (NITROSTAT) SL tablet 0.4 mg  Every 5 Minutes PRN         12/29/23 1157    12/29/23 1141  POC Activated Clotting Time  PROCEDURE ONCE         12/29/23 1102    12/29/23 1141  POC OR Panel, ISTAT  PROCEDURE ONCE         12/29/23 1134    12/29/23 1141  POC OR Panel, ISTAT  PROCEDURE ONCE         12/29/23 1004    12/29/23 1141  POC OR Panel, ISTAT  PROCEDURE ONCE         12/29/23 1103    12/29/23 1140  POC OR Panel, ISTAT  PROCEDURE ONCE         12/29/23 0931    12/29/23 1140  POC OR Panel, ISTAT  PROCEDURE ONCE         12/29/23 0947    12/29/23 1140  POC Activated Clotting Time  PROCEDURE ONCE         12/29/23 1024    12/29/23 1140  POC OR Panel, ISTAT  PROCEDURE ONCE         12/29/23 1024    12/29/23 1140  POC OR Panel, ISTAT  PROCEDURE ONCE         12/29/23 1041    12/29/23 1139  POC OR Panel, ISTAT  PROCEDURE ONCE         12/29/23 0738    12/29/23 1139  POC Activated Clotting Time  PROCEDURE ONCE         12/29/23 0907    12/29/23 1139  POC OR Panel, ISTAT  PROCEDURE ONCE         12/29/23 0925    12/29/23 1138  POC Activated Clotting Time  PROCEDURE ONCE         12/29/23 0737    12/29/23 1138  POC Activated Clotting Time  PROCEDURE ONCE         12/29/23 0925    12/29/23 1138  POC Activated Clotting Time  PROCEDURE ONCE         12/29/23 0946    12/29/23 1138  POC Activated Clotting Time  PROCEDURE ONCE         12/29/23 1003    12/29/23 1124  POC Activated Clotting Time  PROCEDURE ONCE         12/29/23 1139    12/29/23 1103  POC Activated Clotting Time  PROCEDURE ONCE         12/29/23 1137    12/29/23 1046  POC Activated Clotting Time  PROCEDURE ONCE         12/29/23 1137    12/29/23 1039   protamine injection  As Needed,   Status:  Discontinued         12/29/23 1039    12/29/23 1025  POC Activated Clotting Time  PROCEDURE ONCE         12/29/23 1137    12/29/23 1007  POC Activated Clotting Time  PROCEDURE ONCE         12/29/23 1138    12/29/23 0837  POC Activated Clotting Time  PROCEDURE ONCE         12/29/23 1137    12/29/23 0825  heparin (porcine) 5000 UNIT/ML injection  As Needed,   Status:  Discontinued         12/29/23 0825    12/29/23 0825  papaverine injection  As Needed,   Status:  Discontinued         12/29/23 0825    12/29/23 0652  OR Blood Pickup  Once,   Status:  Canceled         12/29/23 0652    12/29/23 0600  metoprolol tartrate (LOPRESSOR) tablet 12.5 mg  On Call to O.R.         12/28/23 1412    12/29/23 0600  ceFAZolin in dextrose (ANCEF) IVPB solution 2,000 mg  On Call to O.R.         12/28/23 1412    12/29/23 0600  Basic Metabolic Panel  Morning Draw,   Status:  Canceled         12/28/23 1412    12/29/23 0539  albuterol (PROVENTIL) nebulizer solution 0.083% 2.5 mg/3mL  Once As Needed,   Status:  Discontinued         12/29/23 0539    12/29/23 0001  NPO Diet NPO Type: Sips with Meds  Diet Effective Midnight,   Status:  Canceled         12/28/23 1412    12/29/23 0000  Clip Hair Chin to Ankles  Once,   Status:  Canceled         12/28/23 1412    12/28/23 2100  Weigh Patient Night Before Surgery  Once,   Status:  Canceled        Comments: Need Actual Weight, Not Stated Weight    12/28/23 1412    12/28/23 2100  chlorhexidine (PERIDEX) 0.12 % solution 15 mL  Every 12 Hours Scheduled,   Status:  Discontinued         12/28/23 1412    12/28/23 2100  Chlorhexidine Gluconate Cloth 2 % pads 1 application   Every 12 Hours,   Status:  Discontinued         12/28/23 1412    12/28/23 2100  mupirocin (BACTROBAN) 2 % nasal ointment 1 application   Every 12 Hours Scheduled,   Status:  Discontinued         12/28/23 1412    12/28/23 2100  sodium chloride 0.9 % flush 10 mL  Every 12 Hours Scheduled,   Status:   Discontinued         12/28/23 1412    12/28/23 2100  sennosides-docusate (PERICOLACE) 8.6-50 MG per tablet 2 tablet  2 Times Daily,   Status:  Discontinued        See [facility] for full Linked Orders Report.    12/28/23 1412    12/28/23 2100  atorvastatin (LIPITOR) tablet 40 mg  Nightly,   Status:  Discontinued         12/28/23 1636    12/28/23 1807  CT Chest Without Contrast Diagnostic  1 Time Imaging         12/28/23 1807    12/28/23 1800  Instruct Patient on Coughing, Deep Breathing and Incentive Spirometry  Every 4 Hours While Awake,   Status:  Canceled       12/28/23 1412    12/28/23 1636  Inpatient Admission  Once         12/28/23 1636    12/28/23 1600  Strict Intake & Output  Every 8 Hours,   Status:  Canceled         12/28/23 1412    12/28/23 1600  Neurovascular Checks  Every 4 Hours,   Status:  Canceled       12/28/23 1412    12/28/23 1600  Vital Signs  Every 4 Hours,   Status:  Canceled       12/28/23 1412    12/28/23 1542  Blood Gas, Arterial -  PROCEDURE ONCE         12/28/23 1539    12/28/23 1539  Spirometry - Pre & Post Bronchodilator  Once         12/28/23 1539    12/28/23 1413  Measure Height  Once         12/28/23 1412    12/28/23 1413  Skin Assessment  Every Shift,   Status:  Canceled       12/28/23 1412    12/28/23 1413  POC Glucose Once  Once        Comments: Obtain glucose at 0400 the day of surgery, if greater than 200, initiate insulin IV protocol      12/28/23 1412    12/28/23 1413  Give Patient Pre-Op Education Booklet / Materials  Once,   Status:  Canceled        Comments: Give Patient Pre-Op Education Booklet / Materials    12/28/23 1412    12/28/23 1413  Obtain Informed Consent  Once         12/28/23 1412    12/28/23 1413  Evaluation For Pre-Op PFT Need  Once,   Status:  Canceled         12/28/23 1412    12/28/23 1413  Notify Surgeon if Patient Currently Taking Metformin, Warfarin, Plavix, Effient, Ticlid, Brillinta, Pradaxa, Xarelto, Eliquis, Savaysa, Coumadin, Pletal, or  Any Other  Antiplatelet / Anticoagulant  Once         12/28/23 1412    12/28/23 1413  RN To Place Hospitalist & Diabetes Educator Consult Orders if Patient Diabetic, POC Glucose is Greater Than 180 or HgbA1c Greater Than 7  Continuous,   Status:  Canceled        Comments: Hospitalist Reason for Consult - Blood Glucose Management  Diabetes Educator Reason for Consult - Diabetes Education    12/28/23 1412    12/28/23 1413  Inpatient Anesthesiology Consult  Once,   Status:  Canceled        Specialty:  Anesthesiology  Provider:  Luis Carlos Yadav MD    12/28/23 1412    12/28/23 1413  CBC & Differential  Once         12/28/23 1412    12/28/23 1413  Comprehensive Metabolic Panel  Once         12/28/23 1412    12/28/23 1413  Magnesium  Once         12/28/23 1412    12/28/23 1413  Hemoglobin A1c  Once         12/28/23 1412    12/28/23 1413  BNP  Once         12/28/23 1412    12/28/23 1413  Lipid Panel  Once         12/28/23 1412    12/28/23 1413  aPTT  Once         12/28/23 1412    12/28/23 1413  Protime-INR  Once         12/28/23 1412    12/28/23 1413  Platelet Function ADP  Once         12/28/23 1412    12/28/23 1413  Urinalysis With Culture If Indicated - Urine, Clean Catch  Once         12/28/23 1412    12/28/23 1413  Blood Gas, Arterial -  Once         12/28/23 1412    12/28/23 1413  Type & Screen  Once         12/28/23 1412    12/28/23 1413  Prepare RBC, 2 Units  Blood - Once         12/28/23 1412    12/28/23 1413  Prepare Platelet Pheresis, 2 Units  Blood - Once,   Status:  Canceled         12/28/23 1412    12/28/23 1413  XR Chest PA & Lateral  1 Time Imaging         12/28/23 1412    12/28/23 1413  Carotid Duplex - Bilateral  Once         12/28/23 1412    12/28/23 1413  Duplex Vein Mapping Lower Extremity - Bilateral CAR  Once         12/28/23 1412    12/28/23 1413  Diet: Cardiac Diets; Healthy Heart (2-3 Na+); Texture: Regular Texture (IDDSI 7); Fluid Consistency: Thin (IDDSI 0)  Diet Effective Now,   Status:  Canceled          12/28/23 1412    12/28/23 1413  COVID PRE-OP / PRE-PROCEDURE SCREENING ORDER (NO ISOLATION) - Swab, Nasopharynx  Once         12/28/23 1412    12/28/23 1413  Intake & Output  Every Shift,   Status:  Canceled       12/28/23 1412    12/28/23 1413  Insert Peripheral IV  Once         12/28/23 1412    12/28/23 1413  Saline Lock & Maintain IV Access  Continuous,   Status:  Canceled         12/28/23 1412    12/28/23 1413  Code Status and Medical Interventions:  Continuous         12/28/23 1412    12/28/23 1413  Place Sequential Compression Device  Once         12/28/23 1412    12/28/23 1413  Maintain Sequential Compression Device  Continuous,   Status:  Canceled         12/28/23 1412    12/28/23 1413  Continuous Cardiac Monitoring  Continuous,   Status:  Canceled        Comments: Follow Standing Orders As Outlined in Process Instructions (Open Order Report to View Full Instructions)    12/28/23 1412    12/28/23 1413  Telemetry - Maintain IV Access  Continuous,   Status:  Canceled         12/28/23 1412    12/28/23 1413  Telemetry - Place Orders & Notify Provider of Results When Patient Experiences Acute Chest Pain, Dysrhythmia or Respiratory Distress  Until Discontinued,   Status:  Canceled         12/28/23 1412    12/28/23 1413  May Be Off Telemetry for Tests  Continuous,   Status:  Canceled         12/28/23 1412    12/28/23 1413  Activity - Ad Anna  Until Discontinued,   Status:  Canceled         12/28/23 1412    12/28/23 1413  CBC Auto Differential  PROCEDURE ONCE         12/28/23 1412    12/28/23 1413  COVID-19,[facility] IN-HOUSE CEPHEID/[facility] NP SWAB IN TRANSPORT MEDIA 1 HR TAT - Swab, Nasopharynx  PROCEDURE ONCE         12/28/23 1412    12/28/23 1412  ALPRAZolam (XANAX) tablet 0.25 mg  Every 8 Hours PRN,   Status:  Discontinued         12/28/23 1412    12/28/23 1412  Potassium Replacement - Follow Nurse / BPA Driven Protocol  As Needed,   Status:  Discontinued         12/28/23 1412    12/28/23 1412  acetaminophen (TYLENOL)  tablet 325 mg  Every 4 Hours PRN,   Status:  Discontinued         12/28/23 1412    12/28/23 1412  ondansetron (ZOFRAN) injection 4 mg  Every 6 Hours PRN,   Status:  Discontinued         12/28/23 1412    12/28/23 1412  sodium chloride 0.9 % flush 10 mL  As Needed,   Status:  Discontinued         12/28/23 1412    12/28/23 1412  sodium chloride 0.9 % infusion 40 mL  As Needed,   Status:  Discontinued         12/28/23 1412    12/28/23 1412  polyethylene glycol (MIRALAX) packet 17 g  Daily PRN,   Status:  Discontinued        See Hyperspace for full Linked Orders Report.    12/28/23 1412 12/28/23 1412  bisacodyl (DULCOLAX) EC tablet 5 mg  Daily PRN,   Status:  Discontinued        See Hyperspace for full Linked Orders Report.    12/28/23 1412 12/28/23 1412  bisacodyl (DULCOLAX) suppository 10 mg  Daily PRN,   Status:  Discontinued        See Hyperspace for full Linked Orders Report.    12/28/23 1412    12/28/23 1412  nitroglycerin (NITROSTAT) SL tablet 0.4 mg  Every 5 Minutes PRN,   Status:  Discontinued         12/28/23 1412    12/28/23 1412  Chlorhexidine Skin Prep - Chin to Toes 12 Hours Prior to Surgery & Morning of Surgery  As Needed,   Status:  Canceled      Comments: Chlorhexidine Skin wipes and instructions for all patients having a procedure requiring an outward incision if not allergic.  If allergic, give antibacterial skin wipes and instructions.  Do not use for facial cases or on any mucus membranes.    12 Hours Prior to Surgery & The Morning of Surgery    12/28/23 1412    Unscheduled  Check Peripheral Pulses As Needed  As Needed       12/29/23 1157    Unscheduled  Cardiac Output Parameters PRN Until 24 Hours Post-Op  As Needed       12/29/23 1157    Unscheduled  Pacemaker Settings - Initiate for Heart Rate Less Than 60 And / Or Hemodynamically Unstable  As Needed      Comments: Mode: AAI  Atrial rate: 90  Atrial mA: 10  Atrial mV: 0.5    12/29/23 1157    Unscheduled  Insert Nasogastric Tube If Indicated  & Not Already in Place  As Needed      Comments: Indications: Nausea, Vomiting, Prolonged Intubation or to Administer Medications  Attach to Low Wall Suction if Any Residual    12/29/23 1157    Unscheduled  Cleanse Incision With Normal Saline and Red[place] With Dry 4x4 Gauze if Incision is Draining  As Needed       12/29/23 1157    Unscheduled  Wound Care  As Needed       12/29/23 1157    Unscheduled  Dangle at Bedside After Extubation  As Needed       12/29/23 1157    Unscheduled  Up in Chair As Tolerated After Extubation  As Needed       12/29/23 1157    Unscheduled  Oxygen Therapy- Nasal Cannula; 2 LPM  Continuous PRN       12/29/23 1157    Unscheduled  Patient May Use Home CPAP / BIPAP As Needed  As Needed       12/29/23 1157    Unscheduled  Blood Gas, Arterial -  As Needed      Comments: 30 Minutes After Ventilator Changes, 30 Minutes After Extubation and PRN      12/29/23 1157    Unscheduled  CBC & Differential  As Needed        Comments: Chest Tube Drainage Greater Than 200mL/hr      12/29/23 1157    Unscheduled  aPTT  As Needed        Comments: Chest Tube Drainage Greater Than 200mL/hr      12/29/23 1157    Unscheduled  Protime-INR  As Needed        Comments: Chest Tube Drainage Greater Than 200mL/hr      12/29/23 1157    Unscheduled  Fibrinogen  As Needed        Comments: Chest Tube Drainage Greater Than 200mL/hr      12/29/23 1157    Unscheduled  Treat Hypoglycemia As Recommended By [employer]™ & Notify Provider of Treatment  As Needed      Comments: Follow Hypoglycemia Orders As Outlined in Process Instructions (Open Order Report to View Full Instructions)  Notify Provider Any Time Hypoglycemia Treatment is Administered    12/29/23 1157    Unscheduled  If Insulin Infusion is Paused - Follow [employer] Instructions  As Needed       12/29/23 1157    Unscheduled  POC Glucose PRN  As Needed      Comments: [employer] recommended POC testing will vary between 15 minutes and 2 hours. Release PRN POC Glucose  orders as needed.      12/29/23 1157    --  Fluticasone-Umeclidin-Vilant (Trelegy Ellipta) 200-62.5-25 MCG/ACT aerosol powder          12/28/23 1457    --  SCANNED - TELEMETRY           12/28/23 0000    --  SCANNED - TELEMETRY           12/28/23 0000    --  SCANNED - TELEMETRY           12/28/23 0000    Signed and Held  Insert Indwelling Urinary Catheter  Once        Comments: Follow Protocol As Outlined in Process Instructions (Open Order Report to View Full Instructions)    Signed and Held    Signed and Held  Assess Need for Indwelling Urinary Catheter - Follow Removal Protocol  Continuous        Comments: Follow Protocol As Outlined in Process Instructions (Open Order Report to View Full Instructions)    Signed and Held    Signed and Held  Urinary Catheter Care  Every Shift       Signed and Held                     Operative/Procedure Notes (last 24 hours)        Jr Jimmy Delgado MD at 12/29/23 0802                                             [facility] CARDIAC SURGERY OP NOTE    Preop Diagnosis: Severe diffuse coronary artery disease.  Unstable angina.  Diabetes.  Multiple calcified pulmonary nodules.    Postop Diagnosis: Same    Indications: This patient had multivessel coronary disease and required bypass surgery.  Operation was advisable to prolong life and relieve symptoms.The  calculated STS Risk score was discussed with the patient and family. All risks and alternatives were discussed with the patient and family.  Counseling was done regarding abuse of tobacco, alcohol and drugs as needed.  A discussion about advanced directive was done with the patient. They understand and wish to proceed.    Procedure: CABG x 7.  Skeletonized LIMA x 2 to diagonal branch and then LAD.  Sequential vein graft to RV marginal branch PDA and then OM 3.  Vein graft to D1.  Vein graft to OM1.  Atrial clip ligation of the left atrial appendage.  Left posterolateral pericardial window for drainage.  Temporary cardiopulmonary  bypass.  Antegrade and retrograde cold blood cardioplegia with warm reperfusion.  Neurologic monitoring.  Endoscopic vein harvest of the left greater saphenous vein with Saphena.    Surgeon: Jimmy Delgado MD    Assistant: Assistant: Gypsy Blackwood CSA was responsible for performing the following activities: Cardiac Surgery First assist, Endoscopic Vein [place] if needed for CABG,  surgical wound closure and their skilled assistance was necessary for the success of this case.     Anesthesia: GET    Findings : Body mass index is 34.61 kg/m².  The heart and cardiac chambers were normal.  The acute marginal branch was 1.5 mm.  The PDA was 1.5 mm and the OM 3 was 1.5 mm.  The marginal branch was 2 mm.  The 2 diagonal branches were 1.5 mm and the LAD was 1.5 mm.  He tended to have posterior plaque.  The vein was 4 mm and good quality.  The JESSE was 2.5 mm and excellent with good blood flow.  The valves were normal.    Operative Procedure: A primary median sternotomy was made while the left greater saphenous vein was harvested with the endoscope.  The internal mammary artery was dissected off the left chest wall with the cautery at low voltage and was skeletonized.  Cardiopulmonary bypass was then established for 94 minutes drifting to 34 °C and appropriate flow rates.  The aorta was crossclamped and 79 minutes and we gave a liter of antegrade cold blood cardioplegia then 1.5 L of retrograde cold blood cardioplegia and repeated doses every 10 to 15 minutes to good effect.  3 veins were anastomosed the ascending aorta with 6-0 Prolene and marked with washers.  The first vein was sewn side-to-side to the RV marginal branch side-to-side to the PDA and then to OM 3 which was a codominant system with 7-0 Prolene.  The next vein was sewn to the marginal branch with 7-0 Prolene.  A 40 mm clip was placed on the left atrial appendage to isolate it since he had gone into atrial fibrillation at the beginning of the procedure.   A left posterolateral pericardial window was created for drainage.  The last vein was sewn to the diagonal branch with 7-0 Prolene.  The mammary was sewn side-to-side to the D2 and then to the LAD with 7-0 Prolene.  A warm dose of retrograde cardioplegia was given and then with strong suction on the Arctic needle vent the cross-clamp was released.  The patient was rewarmed and cardiopulmonary bypass was weaned and discontinued.  Decannulation was effected and usual devices were placed.  Hemostasis was obtained.  3 chest tubes were inserted.  Because of his heavy work we closed the sternum with double #8 wire and the zip fix system for secure closure.  The fascia, soft tissues and skin were closed usual.  The sponge, needle and instrument counts noted to be correct and he went the open-heart recovery room in good condition.  All the Grassley nicely and all the anastomoses were hemostatic.  The sponge, needle and instrument counts noted to be correct.    Complications: None    Tubes: 3    Epicardial Wires: 3    Blood Loss: Minimal.  330 milliliters of Cell Saver returned.    Bypass Time: 94 min    Aortic cross-clamp time: 79 min    Specimen: None    Condition: Good      Patient Care Team:  Bonita Wong FNP as PCP - General (Nurse Practitioner)        Jimmy Delgado MD  12/29/2023  11:59 EST             Electronically signed by Jr Jimmy Delgado MD at 12/29/23 6340

## 2023-12-29 NOTE — PLAN OF CARE
Goal Outcome Evaluation:  Plan of Care Reviewed With: patient        Progress: improving  Outcome Evaluation: POD 0 s/p CABG x7. Out at 1200, extubated at 1605. On 4L NC, sats >96%. NSR 90s, BP maintaining 90-110s on 0.02 of Levo. All lines and tubes intact, output monitored closely. Pain treated per MAR, WCTM.

## 2023-12-29 NOTE — ANESTHESIA POSTPROCEDURE EVALUATION
Patient: Kristian Chandra    Procedure Summary       Date: 12/29/23 Room / Location: [place] [id number] / [facility] CARDIOVASCULAR OPERATING ROOM    Anesthesia Start: 0642 Anesthesia Stop: 1207    Procedure: STERNOTOMY, CORONARY ARTERY BYPASS WITH INTERNAL MAMMARY ARTERY GRAFT X 7, UTILIZING THE LEFT JESSE AND ENDOSCOPICALLY HARVESTED LEFT SAPHENOUS VEIN, LEFT ATRIAL APPENDAGE CLOSURE, TRANSESOPHAGEAL ECHOCARDIOGRAM WITH ANESTHESIA, PRP (Chest) Diagnosis:       Coronary artery disease involving native coronary artery of native heart without angina pectoris      (Coronary artery disease involving native coronary artery of native heart without angina pectoris [I25.10])    Surgeons: Jr Jimmy Delgado MD Provider: Jack Schrader MD    Anesthesia Type: general, [place], CVL, PAC ASA Status: 4            Anesthesia Type: general, [place], CVL, PAC    Vitals  Vitals Value Taken Time   BP     Temp     Pulse 91 12/29/23 1206   Resp     SpO2 100 % 12/29/23 1206   Vitals shown include unfiled device data.        Post Anesthesia Care and Evaluation    Patient location during evaluation: bedside  Patient participation: complete - patient cannot participate    Airway patency: patent  Anesthetic complications: No anesthetic complications  PONV Status: NARespiratory status: ETT

## 2023-12-29 NOTE — ANESTHESIA PROCEDURE NOTES
Airway  Urgency: elective    Date/Time: 12/29/2023 7:05 AM  Airway not difficult    General Information and Staff    Patient location during procedure: OR  Anesthesiologist: Jack Schrader MD    Indications and Patient Condition  Indications for airway management: airway protection    Preoxygenated: yes  Mask difficulty assessment: 1 - vent by mask    Final Airway Details  Final airway type: endotracheal airway      Successful airway: ETT  Cuffed: yes   Successful intubation technique: direct laryngoscopy  Facilitating devices/methods: intubating stylet  Endotracheal tube insertion site: oral  Blade: [place]  Blade size: 4  ETT size (mm): 8.0  Cormack-Lehane Classification: grade I - full view of glottis  Placement verified by: chest auscultation   Cuff volume (mL): 6  Measured from: teeth  ETT/EBT  to teeth (cm): 25  Number of attempts at approach: 1  Assessment: lips, teeth, and gum same as pre-op and atraumatic intubation

## 2023-12-29 NOTE — OP NOTE
[facility] CARDIAC SURGERY OP NOTE    Preop Diagnosis: Severe diffuse coronary artery disease.  Unstable angina.  Diabetes.  Multiple calcified pulmonary nodules.    Postop Diagnosis: Same    Indications: This patient had multivessel coronary disease and required bypass surgery.  Operation was advisable to prolong life and relieve symptoms.The  calculated STS Risk score was discussed with the patient and family. All risks and alternatives were discussed with the patient and family.  Counseling was done regarding abuse of tobacco, alcohol and drugs as needed.  A discussion about advanced directive was done with the patient. They understand and wish to proceed.    Procedure: CABG x 7.  Skeletonized LIMA x 2 to diagonal branch and then LAD.  Sequential vein graft to RV marginal branch PDA and then OM 3.  Vein graft to D1.  Vein graft to OM1.  Atrial clip ligation of the left atrial appendage.  Left posterolateral pericardial window for drainage.  Temporary cardiopulmonary bypass.  Antegrade and retrograde cold blood cardioplegia with warm reperfusion.  Neurologic monitoring.  Endoscopic vein harvest of the left greater saphenous vein with Saphena.    Surgeon: Jimmy Delgado MD    Assistant: Assistant: Gypsy Blackwood CSA was responsible for performing the following activities: Cardiac Surgery First assist, Endoscopic Vein [place] if needed for CABG,  surgical wound closure and their skilled assistance was necessary for the success of this case.     Anesthesia: GET    Findings : Body mass index is 34.61 kg/m².  The heart and cardiac chambers were normal.  The acute marginal branch was 1.5 mm.  The PDA was 1.5 mm and the OM 3 was 1.5 mm.  The marginal branch was 2 mm.  The 2 diagonal branches were 1.5 mm and the LAD was 1.5 mm.  He tended to have posterior plaque.  The vein was 4 mm and good quality.  The JESSE was 2.5 mm and excellent with good blood flow.  The valves were  normal.    Operative Procedure: A primary median sternotomy was made while the left greater saphenous vein was harvested with the endoscope.  The internal mammary artery was dissected off the left chest wall with the cautery at low voltage and was skeletonized.  Cardiopulmonary bypass was then established for 94 minutes drifting to 34 °C and appropriate flow rates.  The aorta was crossclamped and 79 minutes and we gave a liter of antegrade cold blood cardioplegia then 1.5 L of retrograde cold blood cardioplegia and repeated doses every 10 to 15 minutes to good effect.  3 veins were anastomosed the ascending aorta with 6-0 Prolene and marked with washers.  The first vein was sewn side-to-side to the RV marginal branch side-to-side to the PDA and then to OM 3 which was a codominant system with 7-0 Prolene.  The next vein was sewn to the marginal branch with 7-0 Prolene.  A 40 mm clip was placed on the left atrial appendage to isolate it since he had gone into atrial fibrillation at the beginning of the procedure.  A left posterolateral pericardial window was created for drainage.  The last vein was sewn to the diagonal branch with 7-0 Prolene.  The mammary was sewn side-to-side to the D2 and then to the LAD with 7-0 Prolene.  A warm dose of retrograde cardioplegia was given and then with strong suction on the Arctic needle vent the cross-clamp was released.  The patient was rewarmed and cardiopulmonary bypass was weaned and discontinued.  Decannulation was effected and usual devices were placed.  Hemostasis was obtained.  3 chest tubes were inserted.  Because of his heavy work we closed the sternum with double #8 wire and the zip fix system for secure closure.  The fascia, soft tissues and skin were closed usual.  The sponge, needle and instrument counts noted to be correct and he went the open-heart recovery room in good condition.  All the Grassley nicely and all the anastomoses were hemostatic.  The sponge, needle  and instrument counts noted to be correct.    Complications: None    Tubes: 3    Epicardial Wires: 3    Blood Loss: Minimal.  330 milliliters of Cell Saver returned.    Bypass Time: 94 min    Aortic cross-clamp time: 79 min    Specimen: None    Condition: Good      Patient Care Team:  Bonita Wong FNP as PCP - General (Nurse Practitioner)        Jimmy Delgado MD  12/29/2023  11:59 EST

## 2023-12-29 NOTE — ANESTHESIA PROCEDURE NOTES
Central Line      Patient reassessed immediately prior to procedure    Patient location during procedure: OR  Indications: central pressure monitoring, vascular access and MD/Surgeon request  Staff  Anesthesiologist: Jack Schrader MD  Preanesthetic Checklist  Completed: patient identified, IV checked, site marked, risks and benefits discussed, surgical consent, monitors and equipment checked, pre-op evaluation and timeout performed  Central Line Prep  Sterile Tech:gloves, cap, gown, mask and sterile barriers  Prep: chloraprep  Patient monitoring: blood pressure monitoring, continuous pulse oximetry and EKG  Central Line Procedure  Laterality:right  Location:internal jugular  Catheter Type:MAC and Gallion-Valerie  Catheter Size:9 Fr  Guidance:ultrasound guided  PROCEDURE NOTE/ULTRASOUND INTERPRETATION.  Using ultrasound guidance the potential vascular sites for insertion of the catheter were visualized to determine the patency of the vessel to be used for vascular access.  After selecting the appropriate site for insertion, the needle was visualized under ultrasound being inserted into the internal jugular vein, followed by ultrasound confirmation of wire and catheter placement. There were no abnormalities seen on ultrasound; an image was taken; and the patient tolerated the procedure with no complications. Images: still images obtained, printed/placed on chart  Assessment  Post procedure:biopatch applied, line sutured and occlusive dressing applied  Assessement:blood return through all ports, free fluid flow and chest x-ray ordered  Complications:no  Patient Tolerance:patient tolerated the procedure well with no apparent complications  Additional Notes  A Gallion-Valerie catheter was brought onto the field and each port flushed with sterile saline. The catheter was introduced into the Cordis catheter to a distance of 20 cm. The balloon was then inflated and the catheter was advanced through the right ventricle and into the  pulmonary artery via pressure waveform tracing  The balloon was then deflated. The catheter was locked into place with the tip inserted to a distance of 50cm and a sterile dressing applied.

## 2023-12-29 NOTE — ANESTHESIA PREPROCEDURE EVALUATION
Anesthesia Evaluation     Patient summary reviewed   NPO Solid Status: > 8 hours  NPO Liquid Status: > 2 hours           Airway   Mallampati: II  TM distance: >3 FB  Neck ROM: full  Dental      Comment: Denies any chipped, cracked, or loose teeth     Pulmonary - normal exam   (+) COPD,  Cardiovascular - normal exam  Exercise tolerance: good (4-7 METS)    (+) hypertension, past MI  1-7 days, CAD, cardiac stents Drug eluting stent within the past 12 months , angina, hyperlipidemia    ROS comment: Cardiac cath 12/2023  Coronaries: Left dominant system.   Left Main coronary artery: Left main coronary artery was medium caliber   vessel which was free of any disease.  The left main coronary artery   bifurcated into the left anterior descending artery and circumflex artery.     Left anterior descending artery: Anterior descending artery was a medium   caliber vessel which in the proximal portion within the stented area at   the distal and had a discrete 95% stenosis.  The diagonal branch   originating from within the stented area was bifurcating with   nonobstructive disease.  Mid and the distal left anterior descending   artery was free of any obstructive stenosis with good BOSSMAN-3 flow.     Circumflex artery: Complex artery was a medium caliber dominant vessel   with the first obtuse marginal branch in the proximal portion had a   concentric 60 to 70% stenosis and in some views it appears to be more than   80%.  The obtuse marginal branch was a fair caliber vessel.  The   circumflex artery after the origin of the obtuse marginal branch gives   origin to the posterior descending artery distally which had evidence of   luminal irregularity.     Right coronary artery: Coronary artery was a medium caliber codominant   vessel which in the proximal portion after the origin and had a concentric   80% stenosis.  Mid and the distal right coronary artery had evidence of   minimal plaquing.     Estimated Blood Loss:  Minimal      Specimens: None          Complications:  None; patient tolerated the procedure well.      Impression   A.  Proximal right coronary artery with 80 to 90% stenosis.   B.  Proximal left anterior descending artery with 95% stenosis.   C.  First obtuse marginal branch with 60 to 70% stenosis.   D.  Anteroapical wall hypokinesis with an ejection fraction of 45 to 50%.         Stress echo 12/2023  ·  Left ventricular ejection fraction is mildly reduced (Calculated EF =   46%).   · Myocardial perfusion imaging indicates a small-sized, mildly severe   area of ischemia located in the apex.   ·  Myocardial perfusion imaging indicates a small-sized infarct located in   the inferior wall with moderate annie-infarct ischemia.   ·  Impressions are consistent with a high risk study.   ·  Findings consistent with a normal ECG stress test.     Neuro/Psych  GI/Hepatic/Renal/Endo    (+) GERD, hepatitis, liver disease, diabetes mellitus    Musculoskeletal     Abdominal    Substance History      OB/GYN          Other        ROS/Med Hx Other: 48 y.o. male with a history of coronary artery disease who has developed class III angina.                    Anesthesia Plan    ASA 4     general, [place], CVL and PAC     (Complications of general anesthesia include but not limited to awareness under anesthesia, nausea, vomiting, sore throat, hoarseness, chipped or cracked teeth, MI, CVA, or serious allergic reaction. )  intravenous induction   Postoperative Plan: Expected vent after surgery  Anesthetic plan, risks, benefits, and alternatives have been provided, discussed and informed consent has been obtained with: patient.        CODE STATUS:    Level Of Support Discussed With: Patient  Code Status (Patient has no pulse and is not breathing): CPR (Attempt to Resuscitate)  Medical Interventions (Patient has pulse or is breathing): Full Support

## 2023-12-29 NOTE — ANESTHESIA PROCEDURE NOTES
Arterial Line      Patient reassessed immediately prior to procedure    Patient location during procedure: OR   Line placed for hemodynamic monitoring, ABGs/Labs/ISTAT and MD/Surgeon request.  Performed By   Anesthesiologist: Jack Schrader MD   Preanesthetic Checklist  Completed: patient identified, IV checked, site marked, risks and benefits discussed, surgical consent, monitors and equipment checked, pre-op evaluation and timeout performed  Arterial Line Prep    Sterile Tech: cap, gloves and sterile barriers  Prep: ChloraPrep  Patient monitoring: EKG, continuous pulse oximetry and blood pressure monitoring  Arterial Line Procedure   Laterality:left  Location:  radial artery  Catheter size: 20 G   Guidance: ultrasound guided  PROCEDURE NOTE/ULTRASOUND INTERPRETATION.  Using ultrasound guidance the potential vascular sites for insertion of the catheter were visualized to determine the patency of the vessel to be used for vascular access.  After selecting the appropriate site for insertion, the needle was visualized under ultrasound being inserted into the radial artery, followed by ultrasound confirmation of wire and catheter placement. There were no abnormalities seen on ultrasound; an image was taken; and the patient tolerated the procedure with no complications.   Number of attempts: 1  Successful placement: yes Images: still images obtained, printed/placed on the chart  Post Assessment   Dressing Type: wrist guard applied, secured with tape and occlusive dressing applied.   Complications no  Circ/Move/Sens Assessment: normal and unchanged.   Patient Tolerance: patient tolerated the procedure well with no apparent complications

## 2023-12-29 NOTE — ANESTHESIA PROCEDURE NOTES
Diagnostic IntraOp Lito    Procedure Performed: Diagnostic IntraOp Lito       Start Time:        End Time:      Preanesthesia Checklist:  Patient identified, IV assessed, risks and benefits discussed, monitors and equipment assessed, procedure being performed at surgeon's request and anesthesia consent obtained.    General Procedure Information  Diagnostic Indications for Echo:  assessment of surgical repair, defect repair evaluation and hemodynamic monitoring  Physician Requesting Echo: Jr Jimmy Delgado MD  Location performed:  OR  Intubated  Bite block placed  Heart visualized  Probe Insertion:  Easy  Probe Type:  Multiplane  Modalities:  Color flow mapping, continuous wave Doppler and pulse wave Doppler    Echocardiographic and Doppler Measurements    Ventricles    Right Ventricle:  Global function mildly impaired.    Left Ventricle:  Global Function moderately impaired.  Ejection Fraction 35%.    Other Ventricular Findings:       LVEF 35% by 3D Simpsons method    Ventricular Regional Function:    Wall Motion Comments:       Global hypokinesis    Valves    Other Valve Findings:       AV - trileaflet without restricted motion, no stenosis or regurgitation    MV - thickened leaflets without restricted motion, trace regurgitation    TV - thickened leaflets without restricted motion, trace regurgitation    PV - trace regurgitation      Aorta    Ascending Aorta:  Dissection not present.    Aortic Arch:  Dissection not present.  Plaque thickness less than 3 mm.    Descending Aorta:  Dissection not present.  Plaque thickness less than 3 mm.        Atria      Left Atrium:  Left atrial appendage normal.  Other Atria Findings:       Velocities in left atrial appendage <40cm/s    Septa    Atrial Septum:  Intra-atrial septal morphology contains patent foramen ovale.  Patent foramen ovale shunts left to right.          Diastolic Function Measurements:  Diastolic Dysfunction Grade= I  E=  ms  A=  ms  E/A Ratio=   DT=   ms  S/D=   IVRT=    Other Findings  Pericardium:  normal  Pleural Effusion:  none  Pulmonary Arteries:  normal  Pulmonary Venous Flow:  normal    Anesthesia Information  Performed Personally  Anesthesiologist:  Jack Schrader MD      Echocardiogram Comments:       Post CPB exam: s/p CABG x7, GRAYSON closure    RV - normal systolic function  LV - EF58%, no regional wall motion abnormalities  Diastolic function - normal diastolic function    AV - no stenosis or regurgitation  MV - trace regurgitation  TV - trace regurgitation    No pericardial effusion. No pleural effusion. No evidence of aortic dissection.

## 2023-12-30 ENCOUNTER — APPOINTMENT (OUTPATIENT)
Dept: GENERAL RADIOLOGY | Facility: HOSPITAL | Age: 48
End: 2023-12-30
Payer: MEDICAID

## 2023-12-30 LAB
ALBUMIN SERPL-MCNC: 3.5 G/DL (ref 3.5–5.2)
ANION GAP SERPL CALCULATED.3IONS-SCNC: 9.8 MMOL/L (ref 5–15)
BASOPHILS # BLD AUTO: 0.02 10*3/MM3 (ref 0–0.2)
BASOPHILS NFR BLD AUTO: 0.2 % (ref 0–1.5)
BUN SERPL-MCNC: 9 MG/DL (ref 6–20)
BUN/CREAT SERPL: 11 (ref 7–25)
CA-I BLD-MCNC: 4.6 MG/DL (ref 4.6–5.4)
CA-I SERPL ISE-MCNC: 1.15 MMOL/L (ref 1.15–1.35)
CALCIUM SPEC-SCNC: 8.2 MG/DL (ref 8.6–10.5)
CHLORIDE SERPL-SCNC: 110 MMOL/L (ref 98–107)
CO2 SERPL-SCNC: 22.2 MMOL/L (ref 22–29)
CREAT SERPL-MCNC: 0.82 MG/DL (ref 0.76–1.27)
DEPRECATED RDW RBC AUTO: 45.7 FL (ref 37–54)
EGFRCR SERPLBLD CKD-EPI 2021: 107.7 ML/MIN/1.73
EOSINOPHIL # BLD AUTO: 0 10*3/MM3 (ref 0–0.4)
EOSINOPHIL NFR BLD AUTO: 0 % (ref 0.3–6.2)
ERYTHROCYTE [DISTWIDTH] IN BLOOD BY AUTOMATED COUNT: 14.5 % (ref 12.3–15.4)
GLUCOSE BLDC GLUCOMTR-MCNC: 156 MG/DL (ref 70–130)
GLUCOSE BLDC GLUCOMTR-MCNC: 161 MG/DL (ref 70–130)
GLUCOSE BLDC GLUCOMTR-MCNC: 164 MG/DL (ref 70–130)
GLUCOSE BLDC GLUCOMTR-MCNC: 168 MG/DL (ref 70–130)
GLUCOSE BLDC GLUCOMTR-MCNC: 170 MG/DL (ref 70–130)
GLUCOSE BLDC GLUCOMTR-MCNC: 173 MG/DL (ref 70–130)
GLUCOSE BLDC GLUCOMTR-MCNC: 176 MG/DL (ref 70–130)
GLUCOSE SERPL-MCNC: 186 MG/DL (ref 65–99)
HCT VFR BLD AUTO: 40.4 % (ref 37.5–51)
HGB BLD-MCNC: 13.9 G/DL (ref 13–17.7)
IMM GRANULOCYTES # BLD AUTO: 0.04 10*3/MM3 (ref 0–0.05)
IMM GRANULOCYTES NFR BLD AUTO: 0.3 % (ref 0–0.5)
INR PPP: 1.22 (ref 0.9–1.1)
LYMPHOCYTES # BLD AUTO: 1.39 10*3/MM3 (ref 0.7–3.1)
LYMPHOCYTES NFR BLD AUTO: 11.9 % (ref 19.6–45.3)
MAGNESIUM SERPL-MCNC: 2.1 MG/DL (ref 1.6–2.6)
MCH RBC QN AUTO: 30.1 PG (ref 26.6–33)
MCHC RBC AUTO-ENTMCNC: 34.4 G/DL (ref 31.5–35.7)
MCV RBC AUTO: 87.4 FL (ref 79–97)
MONOCYTES # BLD AUTO: 0.91 10*3/MM3 (ref 0.1–0.9)
MONOCYTES NFR BLD AUTO: 7.8 % (ref 5–12)
NEUTROPHILS NFR BLD AUTO: 79.8 % (ref 42.7–76)
NEUTROPHILS NFR BLD AUTO: 9.33 10*3/MM3 (ref 1.7–7)
NRBC BLD AUTO-RTO: 0 /100 WBC (ref 0–0.2)
PHOSPHATE SERPL-MCNC: 2.9 MG/DL (ref 2.5–4.5)
PLATELET # BLD AUTO: 224 10*3/MM3 (ref 140–450)
PMV BLD AUTO: 9.8 FL (ref 6–12)
POTASSIUM SERPL-SCNC: 4.1 MMOL/L (ref 3.5–5.2)
PROTHROMBIN TIME: 15.6 SECONDS (ref 11.7–14.2)
RBC # BLD AUTO: 4.62 10*6/MM3 (ref 4.14–5.8)
SODIUM SERPL-SCNC: 142 MMOL/L (ref 136–145)
WBC NRBC COR # BLD AUTO: 11.69 10*3/MM3 (ref 3.4–10.8)

## 2023-12-30 PROCEDURE — 25010000002 CEFAZOLIN IN DEXTROSE 2-4 GM/100ML-% SOLUTION: Performed by: NURSE PRACTITIONER

## 2023-12-30 PROCEDURE — 97530 THERAPEUTIC ACTIVITIES: CPT

## 2023-12-30 PROCEDURE — 85610 PROTHROMBIN TIME: CPT | Performed by: NURSE PRACTITIONER

## 2023-12-30 PROCEDURE — 80069 RENAL FUNCTION PANEL: CPT | Performed by: NURSE PRACTITIONER

## 2023-12-30 PROCEDURE — 83735 ASSAY OF MAGNESIUM: CPT | Performed by: NURSE PRACTITIONER

## 2023-12-30 PROCEDURE — 93005 ELECTROCARDIOGRAM TRACING: CPT | Performed by: NURSE PRACTITIONER

## 2023-12-30 PROCEDURE — 82330 ASSAY OF CALCIUM: CPT | Performed by: NURSE PRACTITIONER

## 2023-12-30 PROCEDURE — 85025 COMPLETE CBC W/AUTO DIFF WBC: CPT | Performed by: NURSE PRACTITIONER

## 2023-12-30 PROCEDURE — 25010000002 CEFAZOLIN IN DEXTROSE 2000 MG/ 100 ML SOLUTION: Performed by: NURSE PRACTITIONER

## 2023-12-30 PROCEDURE — 82948 REAGENT STRIP/BLOOD GLUCOSE: CPT

## 2023-12-30 PROCEDURE — 93010 ELECTROCARDIOGRAM REPORT: CPT | Performed by: INTERNAL MEDICINE

## 2023-12-30 PROCEDURE — 63710000001 INSULIN LISPRO (HUMAN) PER 5 UNITS: Performed by: NURSE PRACTITIONER

## 2023-12-30 PROCEDURE — 25010000002 ENOXAPARIN PER 10 MG: Performed by: NURSE PRACTITIONER

## 2023-12-30 PROCEDURE — 25010000002 ACETAMINOPHEN 10 MG/ML SOLUTION: Performed by: NURSE PRACTITIONER

## 2023-12-30 PROCEDURE — 25010000002 KETOROLAC TROMETHAMINE PER 15 MG: Performed by: THORACIC SURGERY (CARDIOTHORACIC VASCULAR SURGERY)

## 2023-12-30 PROCEDURE — 25010000002 MAGNESIUM SULFATE IN D5W 1G/100ML (PREMIX) 1-5 GM/100ML-% SOLUTION: Performed by: NURSE PRACTITIONER

## 2023-12-30 PROCEDURE — 71045 X-RAY EXAM CHEST 1 VIEW: CPT

## 2023-12-30 PROCEDURE — 97162 PT EVAL MOD COMPLEX 30 MIN: CPT

## 2023-12-30 RX ORDER — KETOROLAC TROMETHAMINE 30 MG/ML
30 INJECTION, SOLUTION INTRAMUSCULAR; INTRAVENOUS ONCE
Status: COMPLETED | OUTPATIENT
Start: 2023-12-30 | End: 2023-12-30

## 2023-12-30 RX ORDER — INSULIN LISPRO 100 [IU]/ML
2-9 INJECTION, SOLUTION INTRAVENOUS; SUBCUTANEOUS
Status: DISCONTINUED | OUTPATIENT
Start: 2023-12-30 | End: 2024-01-03 | Stop reason: HOSPADM

## 2023-12-30 RX ORDER — IBUPROFEN 600 MG/1
1 TABLET ORAL
Status: DISCONTINUED | OUTPATIENT
Start: 2023-12-30 | End: 2024-01-03 | Stop reason: HOSPADM

## 2023-12-30 RX ORDER — GABAPENTIN 100 MG/1
100 CAPSULE ORAL 2 TIMES DAILY
Status: DISCONTINUED | OUTPATIENT
Start: 2023-12-30 | End: 2023-12-31

## 2023-12-30 RX ORDER — DEXTROSE MONOHYDRATE 25 G/50ML
25 INJECTION, SOLUTION INTRAVENOUS
Status: DISCONTINUED | OUTPATIENT
Start: 2023-12-30 | End: 2024-01-03 | Stop reason: HOSPADM

## 2023-12-30 RX ORDER — NICOTINE POLACRILEX 4 MG
15 LOZENGE BUCCAL
Status: DISCONTINUED | OUTPATIENT
Start: 2023-12-30 | End: 2024-01-03 | Stop reason: HOSPADM

## 2023-12-30 RX ADMIN — CYCLOBENZAPRINE 10 MG: 10 TABLET, FILM COATED ORAL at 16:02

## 2023-12-30 RX ADMIN — INSULIN LISPRO 2 UNITS: 100 INJECTION, SOLUTION INTRAVENOUS; SUBCUTANEOUS at 20:31

## 2023-12-30 RX ADMIN — CEFAZOLIN SODIUM 2 G: 2 INJECTION, SOLUTION INTRAVENOUS at 03:24

## 2023-12-30 RX ADMIN — ENOXAPARIN SODIUM 40 MG: 100 INJECTION SUBCUTANEOUS at 17:53

## 2023-12-30 RX ADMIN — MUPIROCIN 1 APPLICATION: 20 OINTMENT TOPICAL at 20:32

## 2023-12-30 RX ADMIN — OXYCODONE HYDROCHLORIDE 10 MG: 5 TABLET ORAL at 12:12

## 2023-12-30 RX ADMIN — METOPROLOL TARTRATE 25 MG: 25 TABLET, FILM COATED ORAL at 20:31

## 2023-12-30 RX ADMIN — METOPROLOL TARTRATE 25 MG: 25 TABLET, FILM COATED ORAL at 08:46

## 2023-12-30 RX ADMIN — CEFAZOLIN SODIUM 2 G: 2 INJECTION, SOLUTION INTRAVENOUS at 18:39

## 2023-12-30 RX ADMIN — ACETAMINOPHEN 325 MG: 325 TABLET, FILM COATED ORAL at 12:12

## 2023-12-30 RX ADMIN — 0.12% CHLORHEXIDINE GLUCONATE 15 ML: 1.2 RINSE ORAL at 08:47

## 2023-12-30 RX ADMIN — INSULIN LISPRO 2 UNITS: 100 INJECTION, SOLUTION INTRAVENOUS; SUBCUTANEOUS at 11:59

## 2023-12-30 RX ADMIN — OXYCODONE HYDROCHLORIDE 10 MG: 5 TABLET ORAL at 20:31

## 2023-12-30 RX ADMIN — INSULIN HUMAN 2.8 UNITS/HR: 1 INJECTION, SOLUTION INTRAVENOUS at 05:54

## 2023-12-30 RX ADMIN — INSULIN LISPRO 2 UNITS: 100 INJECTION, SOLUTION INTRAVENOUS; SUBCUTANEOUS at 17:53

## 2023-12-30 RX ADMIN — MAGNESIUM SULFATE HEPTAHYDRATE 1 G: 1 INJECTION, SOLUTION INTRAVENOUS at 05:25

## 2023-12-30 RX ADMIN — CYCLOBENZAPRINE 10 MG: 10 TABLET, FILM COATED ORAL at 06:54

## 2023-12-30 RX ADMIN — OXYCODONE HYDROCHLORIDE 10 MG: 5 TABLET ORAL at 08:32

## 2023-12-30 RX ADMIN — OXYCODONE HYDROCHLORIDE 10 MG: 5 TABLET ORAL at 16:02

## 2023-12-30 RX ADMIN — PANTOPRAZOLE SODIUM 40 MG: 40 TABLET, DELAYED RELEASE ORAL at 06:48

## 2023-12-30 RX ADMIN — ATORVASTATIN CALCIUM 40 MG: 20 TABLET, FILM COATED ORAL at 20:31

## 2023-12-30 RX ADMIN — KETOROLAC TROMETHAMINE 30 MG: 30 INJECTION, SOLUTION INTRAMUSCULAR at 08:46

## 2023-12-30 RX ADMIN — CEFAZOLIN SODIUM 2 G: 2 INJECTION, SOLUTION INTRAVENOUS at 11:59

## 2023-12-30 RX ADMIN — OXYCODONE HYDROCHLORIDE 10 MG: 5 TABLET ORAL at 04:01

## 2023-12-30 RX ADMIN — 0.12% CHLORHEXIDINE GLUCONATE 15 ML: 1.2 RINSE ORAL at 20:31

## 2023-12-30 RX ADMIN — GABAPENTIN 100 MG: 100 CAPSULE ORAL at 20:31

## 2023-12-30 RX ADMIN — SENNOSIDES AND DOCUSATE SODIUM 2 TABLET: 50; 8.6 TABLET ORAL at 20:31

## 2023-12-30 RX ADMIN — HYDROCODONE BITARTRATE AND ACETAMINOPHEN 2 TABLET: 5; 325 TABLET ORAL at 06:35

## 2023-12-30 RX ADMIN — ACETAMINOPHEN 1000 MG: 10 INJECTION, SOLUTION INTRAVENOUS at 05:53

## 2023-12-30 RX ADMIN — ALPRAZOLAM 0.25 MG: 0.25 TABLET ORAL at 08:32

## 2023-12-30 RX ADMIN — MUPIROCIN 1 APPLICATION: 20 OINTMENT TOPICAL at 08:47

## 2023-12-30 RX ADMIN — ASPIRIN 81 MG: 81 TABLET, COATED ORAL at 08:32

## 2023-12-30 RX ADMIN — GABAPENTIN 100 MG: 100 CAPSULE ORAL at 11:59

## 2023-12-30 NOTE — PROGRESS NOTES
" LOS: 2 days   Patient Care Team:  Bonita Wong FNP as PCP - General (Nurse Practitioner)    Chief Complaint:   Post-op follow-up, s/p CABG    Subjective:  Symptoms:  No shortness of breath or chest pain.    Diet:  Poor intake.  No nausea or vomiting.    Activity level: Impaired due to pain.    Pain:  He complains of pain that is mild.  He reports pain is improving.  Pain is well controlled and requiring pain medication.          Vital Signs  Temp:  [96.8 °F (36 °C)-99.7 °F (37.6 °C)] 99.3 °F (37.4 °C)  Heart Rate:  [75-96] 83  Resp:  [15-28] 22  BP: ()/(56-86) 122/85  FiO2 (%):  [40 %-99 %] 40 %      12/28/23  1451 12/29/23  0500   Weight: 111 kg (243 lb 11.2 oz) 109 kg (241 lb 2.9 oz)     Body mass index is 34.61 kg/m².    Intake/Output Summary (Last 24 hours) at 12/30/2023 0927  Last data filed at 12/30/2023 0651  Gross per 24 hour   Intake 3856.97 ml   Output 4665 ml   Net -808.03 ml     No intake/output data recorded.    Chest tube drainage last 8 hours: Mediastinals 140/8 ; Pleural 80/8    Objective:  General Appearance:  Comfortable and in no acute distress.    Vital signs: (most recent): Blood pressure 122/85, pulse 83, temperature 99.3 °F (37.4 °C), resp. rate 22, height 177.8 cm (70\"), weight 109 kg (241 lb 2.9 oz), SpO2 94%.  Vital signs are normal.  No fever.    Output: Producing urine.    Lungs:  Normal effort and normal respiratory rate.  He is not in respiratory distress.    Neurological: Patient is alert and oriented to person, place and time.    Skin:  Warm and dry.  (Sternal incision with drsg in place, CDI)          Results Review:      WBC WBC   Date Value Ref Range Status   12/30/2023 11.69 (H) 3.40 - 10.80 10*3/mm3 Final   12/29/2023 13.69 (H) 3.40 - 10.80 10*3/mm3 Final   12/29/2023 14.65 (H) 3.40 - 10.80 10*3/mm3 Final   12/28/2023 6.55 3.40 - 10.80 10*3/mm3 Final      HGB Hemoglobin   Date Value Ref Range Status   12/30/2023 13.9 13.0 - 17.7 g/dL Final   12/29/2023 15.0 13.0 - " 17.7 g/dL Final   12/29/2023 14.3 13.0 - 17.7 g/dL Final   12/29/2023 11.2 (L) 12.0 - 17.0 g/dL Final   12/29/2023 11.2 (L) 12.0 - 17.0 g/dL Final   12/29/2023 11.6 (L) 12.0 - 17.0 g/dL Final   12/29/2023 11.2 (L) 12.0 - 17.0 g/dL Final   12/29/2023 11.9 (L) 12.0 - 17.0 g/dL Final   12/29/2023 11.2 (L) 12.0 - 17.0 g/dL Final   12/29/2023 11.2 (L) 12.0 - 17.0 g/dL Final   12/29/2023 10.9 (L) 12.0 - 17.0 g/dL Final   12/29/2023 13.6 12.0 - 17.0 g/dL Final   12/28/2023 15.4 13.0 - 17.7 g/dL Final      HCT Hematocrit   Date Value Ref Range Status   12/30/2023 40.4 37.5 - 51.0 % Final   12/29/2023 42.7 37.5 - 51.0 % Final   12/29/2023 41.1 37.5 - 51.0 % Final   12/29/2023 33 (L) 38 - 51 % Final   12/29/2023 33 (L) 38 - 51 % Final   12/29/2023 34 (L) 38 - 51 % Final   12/29/2023 33 (L) 38 - 51 % Final   12/29/2023 35 (L) 38 - 51 % Final   12/29/2023 33 (L) 38 - 51 % Final   12/29/2023 33 (L) 38 - 51 % Final   12/29/2023 32 (L) 38 - 51 % Final   12/29/2023 40 38 - 51 % Final   12/28/2023 45.2 37.5 - 51.0 % Final      Platelets Platelets   Date Value Ref Range Status   12/30/2023 224 140 - 450 10*3/mm3 Final   12/29/2023 222 140 - 450 10*3/mm3 Final   12/29/2023 192 140 - 450 10*3/mm3 Final   12/28/2023 261 140 - 450 10*3/mm3 Final        PT/INR:    Protime   Date Value Ref Range Status   12/30/2023 15.6 (H) 11.7 - 14.2 Seconds Final   12/29/2023 17.0 (H) 11.7 - 14.2 Seconds Final   12/28/2023 14.3 (H) 11.7 - 14.2 Seconds Final   /  INR   Date Value Ref Range Status   12/30/2023 1.22 (H) 0.90 - 1.10 Final   12/29/2023 1.37 (H) 0.90 - 1.10 Final   12/28/2023 1.10 0.90 - 1.10 Final       Sodium Sodium   Date Value Ref Range Status   12/30/2023 142 136 - 145 mmol/L Final   12/29/2023 141 136 - 145 mmol/L Final   12/29/2023 142 136 - 145 mmol/L Final   12/28/2023 136 136 - 145 mmol/L Final      Potassium Potassium   Date Value Ref Range Status   12/30/2023 4.1 3.5 - 5.2 mmol/L Final   12/29/2023 4.8 3.5 - 5.2 mmol/L Final    12/29/2023 4.8 3.5 - 5.2 mmol/L Final   12/28/2023 4.1 3.5 - 5.2 mmol/L Final      Chloride Chloride   Date Value Ref Range Status   12/30/2023 110 (H) 98 - 107 mmol/L Final   12/29/2023 109 (H) 98 - 107 mmol/L Final   12/29/2023 108 (H) 98 - 107 mmol/L Final   12/28/2023 105 98 - 107 mmol/L Final      Bicarbonate CO2   Date Value Ref Range Status   12/30/2023 22.2 22.0 - 29.0 mmol/L Final   12/29/2023 23.1 22.0 - 29.0 mmol/L Final   12/29/2023 23.8 22.0 - 29.0 mmol/L Final   12/28/2023 21.2 (L) 22.0 - 29.0 mmol/L Final      BUN BUN   Date Value Ref Range Status   12/30/2023 9 6 - 20 mg/dL Final   12/29/2023 9 6 - 20 mg/dL Final   12/29/2023 10 6 - 20 mg/dL Final   12/28/2023 10 6 - 20 mg/dL Final      Creatinine Creatinine   Date Value Ref Range Status   12/30/2023 0.82 0.76 - 1.27 mg/dL Final   12/29/2023 1.03 0.76 - 1.27 mg/dL Final   12/29/2023 1.07 0.76 - 1.27 mg/dL Final   12/28/2023 1.05 0.76 - 1.27 mg/dL Final      Calcium Calcium   Date Value Ref Range Status   12/30/2023 8.2 (L) 8.6 - 10.5 mg/dL Final   12/29/2023 8.6 8.6 - 10.5 mg/dL Final   12/29/2023 8.3 (L) 8.6 - 10.5 mg/dL Final   12/28/2023 8.9 8.6 - 10.5 mg/dL Final      Magnesium Magnesium   Date Value Ref Range Status   12/30/2023 2.1 1.6 - 2.6 mg/dL Final   12/29/2023 2.3 1.6 - 2.6 mg/dL Final   12/29/2023 2.8 (H) 1.6 - 2.6 mg/dL Final   12/28/2023 2.0 1.6 - 2.6 mg/dL Final        aspirin, 81 mg, Oral, Daily  atorvastatin, 40 mg, Oral, Nightly  ceFAZolin, 2 g, Intravenous, Q8H  chlorhexidine, 15 mL, Mouth/Throat, Q12H  enoxaparin, 40 mg, Subcutaneous, Q24H  metoprolol tartrate, 25 mg, Oral, Q12H  mupirocin, , Each Nare, BID  pantoprazole, 40 mg, Oral, QAM  senna-docusate sodium, 2 tablet, Oral, Nightly             CAD (coronary artery disease)    Tobacco dependence    Dyslipidemia      Assessment & Plan  Multivessel CAD; s/p CABG x7, with atriclip POD#1 [place]  Hypertension  Hyperlipidemia  Obesity -- BMI 34.61  Newly diagnosed DM -- pt  reports new diagnosis last week, A1C ia 8.2    POD1  Patient sitting up in chair, looks good this morning  Pain control an issue overnight, he feels much better this morning  Remains in SR on tele monitor 70s-80s  Off pressors, BP stable in 130s -- will start BB this morning  Swanz and arterial line out this am  Mediastinal CT with air leak, no pneumothorax seen on CXR, keep for now  Okay to transfer to stepdown  Encourage mobilization  Encourage pulmonary toilet    Karol Watson, APRN  12/30/23  09:27 EST

## 2023-12-30 NOTE — PLAN OF CARE
[facility]        NAME: Maggy Yuan is a 23 y o  male  : 2002    MRN: [id number]  DATE: 2023  TIME: 9:18 PM    Assessment and Plan   Flu-like symptoms [R68 89]  1  Flu-like symptoms  Cov/Flu-Collected at [facility]       Ensure adequate hydration  May alternate Tylenol 650 mg every 4-6 hours with ibuprofen 600 mg every 6-8 hours as needed for fever and body aches  May use over-the-counter decongestants as needed  COVID[facility]flu cultures pending, [facility]  Follow-up with primary care provider if symptoms do not resolve within 1 to 2 weeks  Seek immediate reevaluation if you have an intractable fever greater than 100 4, neck pain or stiffness, intractable vomiting or diarrhea  Patient Instructions   Viral Syndrome   WHAT YOU NEED TO KNOW:   Viral syndrome is a term used for symptoms of an infection caused by a virus  Viruses are spread easily from person to person through the air and on shared items  DISCHARGE INSTRUCTIONS:   Call your local emergency number (911 in the [address] [place] [address],[address]) or have someone else call if:   • You have a seizure      • You cannot be woken      • You have chest pain or trouble breathing      Return to the emergency department if:   • You have a stiff neck, a bad headache, and sensitivity to light      • You feel weak, dizzy, or confused      • You stop urinating or urinate a lot less than usual      • You cough up blood or thick yellow or green mucus      • You have severe abdominal pain or your abdomen is larger than usual      Call your doctor if:   • Your symptoms do not get better with treatment or get worse after 3 days      • You have a rash or ear pain      • You have burning when you urinate      • You have questions or concerns about your condition or care      Medicines: You may  need any of the following:  • Acetaminophen  decreases pain and fever  It is available without a doctor's order   Ask how much Goal Outcome Evaluation:           Progress: improving  Outcome Evaluation: Patient over from CVR today and is doing well. Pain is quite severe at times but is being controlled with oxy currently. Air leak noted in mediastinal chest tubes which is known by providers per CVR nurse. Patient up in chair this afternoon and denies needs at this time. Still requiring 2lpm NC. Pacer is off and patient is SR. WCTM.          to take and how often to take it  Follow directions  Read the labels of all other medicines you are using to see if they also contain acetaminophen, or ask your doctor or pharmacist  Acetaminophen can cause liver damage if not taken correctly  Do not use more than 4 grams (4,000 milligrams) total of acetaminophen in one day       • NSAIDs , such as ibuprofen, help decrease swelling, pain, and fever  NSAIDs can cause stomach bleeding or kidney problems in certain people  If you take blood thinner medicine, always ask your healthcare provider if NSAIDs are safe for you  Always read the medicine label and follow directions      • Cold medicine  helps decrease swelling, control a cough, and relieve chest or nasal congestion       • Saline nasal spray  helps decrease nasal congestion       • Take your medicine as directed  Contact your healthcare provider if you think your medicine is not helping or if you have side effects  Tell him of her if you are allergic to any medicine  Keep a list of the medicines, vitamins, and herbs you take  Include the amounts, and when and why you take them  Bring the list or the pill bottles to follow-up visits  Carry your medicine list with you in case of an emergency      Manage your symptoms:   • Drink liquids as directed to prevent dehydration  Ask how much liquid to drink each day and which liquids are best for you  Ask if you should drink an oral rehydration solution (ORS)  An ORS has the right amounts of water, salts, and sugar you need to replace body fluids  This may help prevent dehydration caused by vomiting or diarrhea  Do not drink liquids with caffeine  Liquids with caffeine can make dehydration worse      • Get plenty of rest to help your body heal   Take naps throughout the day  Ask your healthcare provider when you can return to work and your normal activities      • Use a cool mist humidifier to help you breathe easier    Ask your healthcare provider how to use a cool mist humidifier      • Eat honey or use cough drops for a sore throat  Cough drops are available without a doctor's order  Follow directions for taking cough drops      • Do not smoke or be close to anyone who is smoking  Nicotine and other chemicals in cigarettes and cigars can cause lung damage  Smoking can also delay healing  Ask your healthcare provider for information if you currently smoke and need help to quit  E-cigarettes or smokeless tobacco still contain nicotine  Talk to your healthcare provider before you use these products      Prevent the spread of germs:       • Wash your hands often  Wash your hands several times each day  Wash after you use the bathroom, change a child's diaper, and before you prepare or eat food  Use soap and water every time  Rub your soapy hands together, lacing your fingers  Wash the front and back of your hands, and in between your fingers  Use the fingers of one hand to scrub under the fingernails of the other hand  Wash for at least 20 seconds  Rinse with warm, running water for several seconds  Then dry your hands with a clean towel or paper towel  Use hand  that contains alcohol if soap and water are not available  Do not touch your eyes, nose, or mouth without washing your hands first           • Cover a sneeze or cough  Use a tissue that covers your mouth and nose  Throw the tissue away in a trash can right away  Use the bend of your arm if a tissue is not available  Wash your hands well with soap and water or use a hand       • Stay away from others while you are sick  Avoid crowds as much as possible      • Ask about vaccines you may need  Talk to your healthcare provider about your vaccine history  He or she will tell you which vaccines you need, and when to get them      ? Get the influenza (flu) vaccine as soon as recommended each year  The flu vaccine is available starting in September or October   Flu viruses change, so it is important to get a flu vaccine every year      ? Get the pneumonia vaccine if recommended  This vaccine is usually recommended every 5 years  Your provider will tell you when to get this vaccine, if needed      Follow up with your doctor as directed:  Write down your questions so you remember to ask them during your visits  © Copyright [employer] 2022 Information is for End User's use only and may not be sold, redistributed or otherwise used for commercial purposes  All illustrations and images included in CareNotes® are the copyrighted property of [employer]  or Ruth Ann Dorado   The above information is an  only  It is not intended as medical advice for individual conditions or treatments  Talk to your doctor, nurse or pharmacist before following any medical regimen to see if it is safe and effective for you  Follow up with PCP in 3-5 days  Proceed to  ER if symptoms worsen  Chief Complaint     Chief Complaint   Patient presents with   • Nasal Congestion   • Generalized Body Aches     Started yesterday evening   • Fever     Felt hot but did not take temperature         History of Present Illness       Patient is a 50-year-old male with no significant past medical history, who presents for evaluation of congestion, body aches, subjective fevers which began last evening  He did not take his temperature, but notes chills and diaphoresis during sleep  He denies chest pain, palpitations, shortness of breath, rash, lightheadedness, nausea/vomiting/diarrhea  Review of Systems   Review of Systems   Constitutional: Positive for chills and diaphoresis  Negative for fatigue and fever  HENT: Positive for congestion  Negative for ear discharge, ear pain, postnasal drip, rhinorrhea, sinus pressure, sinus pain, sneezing and sore throat  Eyes: Negative  Negative for pain, discharge, redness and itching  Respiratory: Positive for cough   Negative for apnea, choking, chest tightness, shortness of breath, wheezing and stridor  Cardiovascular: Negative  Negative for chest pain and palpitations  Gastrointestinal: Negative  Negative for diarrhea, nausea and vomiting  Endocrine: Negative  Negative for polydipsia, polyphagia and polyuria  Genitourinary: Negative  Negative for decreased urine volume and flank pain  Musculoskeletal: Positive for myalgias  Negative for arthralgias, back pain, neck pain and neck stiffness  Skin: Negative  Negative for color change and rash  Allergic/Immunologic: Negative  Negative for environmental allergies  Neurological: Negative  Negative for dizziness, facial asymmetry, light-headedness, numbness and headaches  Hematological: Negative  Negative for adenopathy  Psychiatric/Behavioral: Negative  Current Medications       Current Outpatient Medications:   •  oxyCODONE (Roxicodone) 5 immediate release tablet, Take 1 tablet (5 mg total) by mouth every 6 (six) hours as needed for moderate pain Do not drive or drink alcohol while taking medication  Will cause drowsiness  Max Daily Amount: 20 mg, Disp: 8 tablet, Rfl: 0    Current Allergies     Allergies as of 01/11/2023 - Reviewed 01/11/2023   Allergen Reaction Noted   • Amoxicillin Rash 11/21/2022            The following portions of the patient's history were reviewed and updated as appropriate: allergies, current medications, past family history, past medical history, past social history, past surgical history and problem list      No past medical history on file  No past surgical history on file  Family History   Problem Relation Age of Onset   • No Known Problems Mother    • No Known Problems Father          Medications have been verified  Objective   /79 (BP Location: Right arm, Patient Position: Sitting, Cuff Size: Large)   Pulse 97   Temp 98 °F (36 7 °C) (Temporal)   Resp 12   SpO2 98%        Physical Exam     Physical Exam  Vitals reviewed     Constitutional: General: He is not in acute distress  Appearance: Normal appearance  He is not ill-appearing, toxic-appearing or diaphoretic  Interventions: He is not intubated  HENT:      Head: Normocephalic and atraumatic  Right Ear: Tympanic membrane, ear canal and external ear normal  There is no impacted cerumen  Left Ear: Tympanic membrane, ear canal and external ear normal  There is no impacted cerumen  Nose: Nose normal  No congestion or rhinorrhea  Mouth/Throat:      Mouth: Mucous membranes are moist       Pharynx: Oropharynx is clear  Uvula midline  No pharyngeal swelling, oropharyngeal exudate, posterior oropharyngeal erythema or uvula swelling  Tonsils: No tonsillar exudate or tonsillar abscesses  1+ on the right  1+ on the left  Eyes:      Extraocular Movements: Extraocular movements intact  Conjunctiva/sclera: Conjunctivae normal       Pupils: Pupils are equal, round, and reactive to light  Cardiovascular:      Rate and Rhythm: Normal rate and regular rhythm  Pulses: Normal pulses  Heart sounds: Normal heart sounds, S1 normal and S2 normal  Heart sounds not distant  No murmur heard  No friction rub  No gallop  Pulmonary:      Effort: Pulmonary effort is normal  No tachypnea, bradypnea, accessory muscle usage, prolonged expiration, respiratory distress or retractions  He is not intubated  Breath sounds: Normal breath sounds  No stridor, decreased air movement or transmitted upper airway sounds  No decreased breath sounds, wheezing, rhonchi or rales  Chest:      Chest wall: No tenderness  Musculoskeletal:         General: Normal range of motion  Cervical back: Full passive range of motion without pain, normal range of motion and neck supple  No rigidity or tenderness  No spinous process tenderness or muscular tenderness  Normal range of motion  Lymphadenopathy:      Cervical: No cervical adenopathy        Right cervical: No superficial cervical adenopathy  Left cervical: No superficial cervical adenopathy  Skin:     General: Skin is warm and dry  Capillary Refill: Capillary refill takes less than 2 seconds  Findings: No erythema  Neurological:      General: No focal deficit present  Mental Status: He is alert     Psychiatric:         Mood and Affect: Mood normal

## 2023-12-30 NOTE — PLAN OF CARE
Goal Outcome Evaluation:  The patients progress has been significantly positive & ongoing this shift. Pain has been an overwhelming factor in the patients recovery plan. He appears to be quite the loquacious type & has a very low tolerance for pain. Their are times in which he is inconsolable & his pain levels have been so high he expresses regret & indignation for the process that has saved his life. Placing him in a chair has proven to relieve almost all of his anxiety & has subsequently allowed the pain medication to adequately manage his pain. Physiologically, he has been self pacing the entire shift in the mid 70's to low 80's w/ out significant ectopy. All of his cardiac index have been in the mid to upper 2.0-2.5. I have pulled his [place] & his Arterial line was removed. He is hemodynamically stable, off low dose pressor support since 05:00. Vital signs continue to be stable w/ a MAP > than 70. He continues to be on an Insulin drip & require scheduled electrolyte replacement. He remains on 2 liters supplemental O2 for comfort. Setting clear boundaries on pain management & providing him w/ goals have made a significant change in his attitude. The patient is progressing positively & warrants due consideration in transition of care.    Problem: Adult Inpatient Plan of Care  Goal: Plan of Care Review  Outcome: Ongoing, Progressing  Flowsheets (Taken 12/30/2023 0606)  Plan of Care Reviewed With: patient  Goal: Patient-Specific Goal (Individualized)  Outcome: Ongoing, Progressing  Goal: Absence of Hospital-Acquired Illness or Injury  Outcome: Ongoing, Progressing  Intervention: Identify and Manage Fall Risk  Recent Flowsheet Documentation  Taken 12/30/2023 0000 by Aydin Zaragoza RN  Safety Promotion/Fall Prevention: safety round/check completed  Taken 12/29/2023 2200 by Aydin Zaragoza, RN  Safety Promotion/Fall Prevention: safety round/check completed  Taken 12/29/2023 2000 by Aydin Zaragoza, RN  Safety  Promotion/Fall Prevention: safety round/check completed  Intervention: Prevent Skin Injury  Recent Flowsheet Documentation  Taken 12/30/2023 0000 by Aydin Zaragoza RN  Body Position: turned  Skin Protection: adhesive use limited  Taken 12/29/2023 2200 by Aydin Zaragoza RN  Body Position: turned  Taken 12/29/2023 2000 by Aydin Zaragoza RN  Body Position: turned  Skin Protection:   adhesive use limited   incontinence pads utilized  Intervention: Prevent and Manage VTE (Venous Thromboembolism) Risk  Recent Flowsheet Documentation  Taken 12/30/2023 0000 by Aydin Zaragoza RN  VTE Prevention/Management:   bilateral   compression stockings on   sequential compression devices on  Taken 12/29/2023 2000 by Aydin Zaragoza RN  Activity Management: activity minimized  VTE Prevention/Management:   bilateral   sequential compression devices on   compression stockings on  Intervention: Prevent Infection  Recent Flowsheet Documentation  Taken 12/30/2023 0000 by Aydin Zaragoza RN  Infection Prevention:   cohorting utilized   rest/sleep promoted  Taken 12/29/2023 2000 by Aydin Zaragoza RN  Infection Prevention:   cohorting utilized   rest/sleep promoted  Goal: Optimal Comfort and Wellbeing  Outcome: Ongoing, Progressing  Intervention: Monitor Pain and Promote Comfort  Recent Flowsheet Documentation  Taken 12/29/2023 2100 by Aydin Zaragoza RN  Pain Management Interventions: see MAR  Taken 12/29/2023 2000 by Aydin Zaragoza RN  Pain Management Interventions:   pain management plan reviewed with patient/caregiver   quiet environment facilitated   see MAR  Intervention: Provide Person-Centered Care  Recent Flowsheet Documentation  Taken 12/30/2023 0000 by Aydin Zaragoza RN  Trust Relationship/Rapport:   care explained   choices provided   emotional support provided   empathic listening provided   questions answered   questions encouraged   reassurance provided   thoughts/feelings acknowledged  Taken 12/29/2023 2000 by  Aydin Zaragoza RN  Trust Relationship/Rapport:   care explained   choices provided   emotional support provided   empathic listening provided   questions answered   questions encouraged   reassurance provided   thoughts/feelings acknowledged  Goal: Readiness for Transition of Care  Outcome: Ongoing, Progressing     Problem: COPD (Chronic Obstructive Pulmonary Disease) Comorbidity  Goal: Maintenance of COPD Symptom Control  Outcome: Ongoing, Progressing  Intervention: Maintain COPD-Symptom Control  Recent Flowsheet Documentation  Taken 12/30/2023 0000 by Aydin Zaragoza RN  Supportive Measures: active listening utilized  Medication Review/Management: medications reviewed  Taken 12/29/2023 2200 by Aydin Zaragoza RN  Medication Review/Management: medications reviewed  Taken 12/29/2023 2000 by Aydin Zaragoza RN  Supportive Measures:   active listening utilized   self-reflection promoted   self-responsibility promoted  Medication Review/Management: medications reviewed     Problem: Diabetes Comorbidity  Goal: Blood Glucose Level Within Targeted Range  Outcome: Ongoing, Progressing  Intervention: Monitor and Manage Glycemia  Recent Flowsheet Documentation  Taken 12/30/2023 0000 by Aydin Zaragoza RN  Glycemic Management: blood glucose monitored  Taken 12/29/2023 2000 by Aydin Zaragoza RN  Glycemic Management: blood glucose monitored     Problem: Hypertension Comorbidity  Goal: Blood Pressure in Desired Range  Outcome: Ongoing, Progressing  Intervention: Maintain Blood Pressure Management  Recent Flowsheet Documentation  Taken 12/30/2023 0000 by Aydin Zaragoza RN  Medication Review/Management: medications reviewed  Taken 12/29/2023 2200 by Aydin Zaragoza RN  Medication Review/Management: medications reviewed  Taken 12/29/2023 2000 by Aydin Zaragoza RN  Medication Review/Management: medications reviewed     Problem: Skin Injury Risk Increased  Goal: Skin Health and Integrity  Outcome: Ongoing,  Progressing  Intervention: Optimize Skin Protection  Recent Flowsheet Documentation  Taken 12/30/2023 0000 by Aydin Zaragoza RN  Pressure Reduction Techniques:   heels elevated off bed   positioned off wounds   weight shift assistance provided  Head of Bed (HOB) Positioning: HOB at 20-30 degrees  Pressure Reduction Devices: specialty bed utilized  Skin Protection: adhesive use limited  Taken 12/29/2023 2200 by Aydin Zaragoza RN  Head of Bed (HOB) Positioning: HOB at 20-30 degrees  Taken 12/29/2023 2000 by Aydin Zaragoza RN  Pressure Reduction Techniques: heels elevated off bed  Head of Bed (HOB) Positioning: HOB at 20-30 degrees  Pressure Reduction Devices: specialty bed utilized  Skin Protection:   adhesive use limited   incontinence pads utilized     Problem: Fall Injury Risk  Goal: Absence of Fall and Fall-Related Injury  Outcome: Ongoing, Progressing  Intervention: Identify and Manage Contributors  Recent Flowsheet Documentation  Taken 12/30/2023 0000 by Aydin Zaragoza RN  Medication Review/Management: medications reviewed  Taken 12/29/2023 2200 by Aydin Zaragoza RN  Medication Review/Management: medications reviewed  Taken 12/29/2023 2000 by Aydin Zaragoza RN  Medication Review/Management: medications reviewed  Intervention: Promote Injury-Free Environment  Recent Flowsheet Documentation  Taken 12/30/2023 0000 by Aydin Zaragoza RN  Safety Promotion/Fall Prevention: safety round/check completed  Taken 12/29/2023 2200 by Aydin Zaragoza RN  Safety Promotion/Fall Prevention: safety round/check completed  Taken 12/29/2023 2000 by Aydin Zaragoza RN  Safety Promotion/Fall Prevention: safety round/check completed     Problem: Activity Intolerance (Cardiovascular Surgery)  Goal: Improved Activity Tolerance  Outcome: Ongoing, Progressing  Intervention: Optimize Tolerance for Activity  Recent Flowsheet Documentation  Taken 12/30/2023 0000 by Aydin Zaragoza RN  Environmental Support: calm environment  promoted  Taken 12/29/2023 2000 by Aydin Zaragoza RN  Environmental Support:   calm environment promoted   environmental consistency promoted     Problem: Adjustment to Surgery (Cardiovascular Surgery)  Goal: Optimal Coping with Heart Surgery  Outcome: Ongoing, Progressing  Intervention: Support Psychosocial Response to Surgery  Recent Flowsheet Documentation  Taken 12/30/2023 0000 by Aydin Zaragoza RN  Supportive Measures: active listening utilized  Family/Support System Care: caregiver stress acknowledged  Taken 12/29/2023 2000 by Aydin Zaragoza RN  Supportive Measures:   active listening utilized   self-reflection promoted   self-responsibility promoted  Family/Support System Care:   caregiver stress acknowledged   involvement promoted   presence promoted   self-care encouraged   support provided     Problem: Bleeding (Cardiovascular Surgery)  Goal: Bleeding (Cardiovascular Surgery)  Outcome: Ongoing, Progressing     Problem: Bleeding (Cardiovascular Surgery)  Goal: Bleeding (Cardiovascular Surgery)  Outcome: Ongoing, Progressing     Problem: Bowel Motility Impaired (Cardiovascular Surgery)  Goal: Effective Bowel Elimination (Cardiovascular Surgery)  Outcome: Ongoing, Progressing     Problem: Cardiac Function Impaired (Cardiovascular Surgery)  Goal: Effective Cardiac Function  Outcome: Ongoing, Progressing     Problem: Cerebral Tissue Perfusion (Cardiovascular Surgery)  Goal: Optimal Cerebral Tissue Perfusion (Cardiovascular Surgery)  Outcome: Ongoing, Progressing  Intervention: Protect and Optimize Cerebral Perfusion  Recent Flowsheet Documentation  Taken 12/30/2023 0000 by Aydin Zaragoza RN  Glycemic Management: blood glucose monitored  Head of Bed (HOB) Positioning: HOB at 20-30 degrees  Cerebral Perfusion Promotion: blood pressure monitored  Taken 12/29/2023 2200 by Aydin Zaragoza RN  Head of Bed (HOB) Positioning: HOB at 20-30 degrees  Taken 12/29/2023 2000 by Aydin Zaragoza RN  Glycemic  Management: blood glucose monitored  Head of Bed (HOB) Positioning: HOB at 20-30 degrees  Cerebral Perfusion Promotion: blood pressure monitored     Problem: Fluid and Electrolyte Imbalance (Cardiovascular Surgery)  Goal: Fluid and Electrolyte Balance (Cardiovascular Surgery)  Outcome: Ongoing, Progressing     Problem: Cerebral Tissue Perfusion (Cardiovascular Surgery)  Goal: Optimal Cerebral Tissue Perfusion (Cardiovascular Surgery)  Outcome: Ongoing, Progressing  Intervention: Protect and Optimize Cerebral Perfusion  Recent Flowsheet Documentation  Taken 12/30/2023 0000 by Aydin Zaragoza RN  Glycemic Management: blood glucose monitored  Head of Bed (HOB) Positioning: HOB at 20-30 degrees  Cerebral Perfusion Promotion: blood pressure monitored  Taken 12/29/2023 2200 by Aydin Zaragoza RN  Head of Bed (HOB) Positioning: HOB at 20-30 degrees  Taken 12/29/2023 2000 by Aydin Zaragoza RN  Glycemic Management: blood glucose monitored  Head of Bed (HOB) Positioning: HOB at 20-30 degrees  Cerebral Perfusion Promotion: blood pressure monitored     Problem: Glycemic Control Impaired (Cardiovascular Surgery)  Goal: Blood Glucose Level Within Targeted Range (Cardiovascular Surgery)  Outcome: Ongoing, Progressing  Intervention: Optimize Glycemic Control  Recent Flowsheet Documentation  Taken 12/30/2023 0000 by Aydin Zaragoza RN  Glycemic Management: blood glucose monitored  Taken 12/29/2023 2000 by Aydin Zaragoza RN  Glycemic Management: blood glucose monitored     Problem: Infection (Cardiovascular Surgery)  Goal: Absence of Infection Signs and Symptoms  Outcome: Ongoing, Progressing  Intervention: Prevent or Manage Infection  Recent Flowsheet Documentation  Taken 12/30/2023 0000 by Aydin Zaragoza RN  Infection Prevention:   cohorting utilized   rest/sleep promoted  Taken 12/29/2023 2000 by Aydin Zaragoza RN  Infection Prevention:   cohorting utilized   rest/sleep promoted     Problem: Ongoing Anesthesia Effects  (Cardiovascular Surgery)  Goal: Anesthesia/Sedation Recovery  Outcome: Ongoing, Progressing  Intervention: Optimize Anesthesia Recovery  Recent Flowsheet Documentation  Taken 12/30/2023 0000 by Aydin Zaragoza RN  Patient Tolerance (IS): poor  Safety Promotion/Fall Prevention: safety round/check completed  Administration (IS):   instruction provided, follow-up   refused  Taken 12/29/2023 2200 by Aydin Zaragoza RN  Safety Promotion/Fall Prevention: safety round/check completed  Taken 12/29/2023 2000 by Aydin Zaragoza RN  Patient Tolerance (IS): poor  Safety Promotion/Fall Prevention: safety round/check completed  Administration (IS):   instruction provided, initial   refused     Problem: Pain (Cardiovascular Surgery)  Goal: Acceptable Pain Control  Outcome: Ongoing, Progressing  Intervention: Prevent or Manage Pain  Recent Flowsheet Documentation  Taken 12/30/2023 0000 by Aydin Zaragoza RN  Diversional Activities: television  Taken 12/29/2023 2100 by Aydin Zaragoza RN  Pain Management Interventions: see MAR  Taken 12/29/2023 2000 by Aydin Zaragoza RN  Pain Management Interventions:   pain management plan reviewed with patient/caregiver   quiet environment facilitated   see MAR  Diversional Activities: television     Problem: Postoperative Nausea and Vomiting (Cardiovascular Surgery)  Goal: Nausea and Vomiting Relief (Cardiovascular Surgery)  Outcome: Ongoing, Progressing     Problem: Postoperative Urinary Retention (Cardiovascular Surgery)  Goal: Effective Urinary Elimination (Cardiovascular Surgery)  Outcome: Ongoing, Progressing     Problem: Respiratory Compromise (Cardiovascular Surgery)  Goal: Effective Oxygenation and Ventilation (Cardiovascular Surgery)  Outcome: Ongoing, Progressing  Intervention: Promote Airway Secretion Clearance  Recent Flowsheet Documentation  Taken 12/30/2023 0000 by Aydin Zaragoza RN  Patient Tolerance (IS): poor  Administration (IS):   instruction provided, follow-up    refused  Cough And Deep Breathing: unable to perform  Taken 12/29/2023 2000 by Aydin Zaragoza, RN  Patient Tolerance (IS): poor  Administration (IS):   instruction provided, initial   refused  Cough And Deep Breathing: refused

## 2023-12-30 NOTE — PLAN OF CARE
Goal Outcome Evaluation:  Plan of Care Reviewed With: patient           Outcome Evaluation: Kristian Chandra is a 48 year old male POD1 s/p CABGx7. Pt. is independent at baseline, lives with his wife in a one level home with 2 DARIN. He does not use AD at baseline. He reports no hx of falls. Today, pt. UIC at arrival of PT. He performs STS with CGAx2, and ambulates ~40 ft with CGAx2, extra assistance necessary for line management. Pt. reporting difficulty taking a full breath throughout, nursing aware and educated patient on appropriate breath control to avoid bearing down. PT recommending home with assist at discharge once medically stable.      Anticipated Discharge Disposition (PT): home, home with assist, home with 24/7 care

## 2023-12-30 NOTE — THERAPY EVALUATION
Patient Name: Kristian Chandra  : 1975    MRN: [id number]                              Today's Date: 2023       Admit Date: 2023    Visit Dx:     ICD-10-CM ICD-9-CM   1. S/P CABG (coronary artery bypass graft)  Z95.1 V45.81     Patient Active Problem List   Diagnosis    Chest pain    CAD (coronary artery disease)    Tobacco dependence    Dyslipidemia     Past Medical History:   Diagnosis Date    Abnormal liver function tests     elevated    Acute bronchitis     Asthma, allergic     Blastomycosis     Chronic obstructive lung disease     Chronic persistent hepatitis     Coronary arteriosclerosis     post stent     Diabetes mellitus     Diarrhea     Dyspnea on exertion     Epigastric pain     Essential hypertension     Generalized abdominal pain     GERD (gastroesophageal reflux disease)     with esophagitis    H/O echocardiogram     Normal left ventricular systolic function with EF of 65%. Concentric left ventricular hypertrophy. 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 Postdilated using a noncompliant balloon [id number] balloon 2011        CARDIAC CATHETERIZATION N/A 2017    Procedure: Left Heart Cath;  Surgeon: Melissa Tineo MD;  Location: [facility] INVASIVE LOCATION;  Service:     COLONOSCOPY      External and internal hemorrhoids.Medium sized lipoma at the ileocecal valve. Biopsied.Several biopsies obtained in the entire colon and in the terminal ileum. 2016        CORONARY STENT PLACEMENT       ENDOSCOPY      Internal & external hemorrhoids found. 12/21/2011      ENDOSCOPY      Mildly severe esophagitis. Gastritis. Normal duodenum. Biopsied. Several biopsies obtained in the lower third of the esophagus.Several biopsies obtained in the gastric antrum. 07/08/2016        MO RT/LT HEART CATHETERS N/A 06/16/2017    Procedure: Percutaneous Coronary Intervention;  Surgeon: Melissa Tineo MD;  Location: [facility] CATH INVASIVE LOCATION;  Service: Cardiovascular      General Information       Row Name 12/30/23 1139          Physical Therapy Time and Intention    Document Type evaluation  -ER     Mode of Treatment individual therapy;physical therapy  -ER       Row Name 12/30/23 1139          General Information    Patient Profile Reviewed yes  -ER     Prior Level of Function independent:  -ER     Existing Precautions/Restrictions cardiac  -ER     Barriers to Rehab medically complex  -ER       Row Name 12/30/23 1139          Living Environment    People in Home spouse  -ER       Row Name 12/30/23 1139          Home Main Entrance    Number of Stairs, Main Entrance two  -ER     Stair Railings, Main Entrance none  -ER       Row Name 12/30/23 1139          Stairs Within Home, Primary    Number of Stairs, Within Home, Primary none  -ER       Row Name 12/30/23 1139          Cognition    Orientation Status (Cognition) oriented x 4  -ER       Row Name 12/30/23 1139          Safety Issues, Functional Mobility    Impairments Affecting Function (Mobility) endurance/activity tolerance;pain;shortness of breath  -ER     Comment, Safety Issues/Impairments (Mobility) Pt. requiring cues to refrain from bearing down/grunting - enocuraged normal breath control  -ER               User Key  (r) = Recorded By, (t) = Taken By, (c) = Cosigned By      Initials Name Provider Type    ER Naomi Rodriguez, PT Physical Therapist                   Mobility       Row Name 12/30/23 1145          Bed Mobility    Comment, (Bed Mobility) UIC  -ER       Row  Name 12/30/23 1145          Sit-Stand Transfer    Sit-Stand Crozier (Transfers) contact guard  -ER       Row Name 12/30/23 1145          Gait/Stairs (Locomotion)    Crozier Level (Gait) contact guard;2 person assist;1 person to manage equipment  -ER     Patient was able to Ambulate yes  -ER     Distance in Feet (Gait) 40  -ER     Deviations/Abnormal Patterns (Gait) stride length decreased;weight shifting decreased;jerrica decreased  -ER     Bilateral Gait Deviations heel strike decreased  -ER     Comment, (Gait/Stairs) step to gait pattern, very slow speed  -ER               User Key  (r) = Recorded By, (t) = Taken By, (c) = Cosigned By      Initials Name Provider Type    ER Naomi Rodriguez, PT Physical Therapist                   Obj/Interventions       Row Name 12/30/23 1308          Range of Motion Comprehensive    General Range of Motion bilateral lower extremity ROM WFL  -ER       Row Name 12/30/23 1308          Strength Comprehensive (MMT)    Comment, General Manual Muscle Testing (MMT) Assessment Generalized post op weakness  -ER       Row Name 12/30/23 1308          Motor Skills    Therapeutic Exercise other (see comments)  Cardiac therex  -ER       Row Name 12/30/23 1308          Balance    Balance Assessment sitting static balance;standing static balance  -ER     Static Sitting Balance standby assist;supervision  -ER     Position, Sitting Balance sitting in chair  -ER     Static Standing Balance contact guard  -ER     Position/Device Used, Standing Balance other (see comments)  no AD  -ER     Balance Interventions sitting;sit to stand;standing;supported;dynamic;static  -ER       Row Name 12/30/23 1308          Sensory Assessment (Somatosensory)    Sensory Assessment (Somatosensory) sensation intact  -ER               User Key  (r) = Recorded By, (t) = Taken By, (c) = Cosigned By      Initials Name Provider Type    ER Naomi Rodriguez, PT Physical Therapist                   Goals/Plan       Row Name  12/30/23 1316          Bed Mobility Goal 1 (PT)    Activity/Assistive Device (Bed Mobility Goal 1, PT) bed mobility activities, all  -ER     Hickman Level/Cues Needed (Bed Mobility Goal 1, PT) supervision required  -ER     Time Frame (Bed Mobility Goal 1, PT) 2 weeks  -ER       Row Name 12/30/23 1316          Transfer Goal 1 (PT)    Activity/Assistive Device (Transfer Goal 1, PT) transfers, all  -ER     Hickman Level/Cues Needed (Transfer Goal 1, PT) standby assist;supervision required  -ER       Row Name 12/30/23 1316          Gait Training Goal 1 (PT)    Activity/Assistive Device (Gait Training Goal 1, PT) gait (walking locomotion)  -ER     Hickman Level (Gait Training Goal 1, PT) supervision required  -ER     Distance (Gait Training Goal 1, PT) 150  -ER     Time Frame (Gait Training Goal 1, PT) 2 weeks  -ER       Row Name 12/30/23 1316          Stairs Goal 1 (PT)    Activity/Assistive Device (Stairs Goal 1, PT) stairs, all skills  -ER     Hickman Level/Cues Needed (Stairs Goal 1, PT) supervision required  -ER     Number of Stairs (Stairs Goal 1, PT) 2  -ER     Time Frame (Stairs Goal 1, PT) 2 weeks  -ER       Row Name 12/30/23 1316          Problem Specific Goal 1 (PT)    Problem Specific Goal 1 (PT) Cardiac Protocol  -ER     Time Frame (Problem Specific Goal 1, PT) 2 weeks  -ER       Row Name 12/30/23 1316          Therapy Assessment/Plan (PT)    Planned Therapy Interventions (PT) balance training;bed mobility training;gait training;home exercise program;patient/family education;strengthening;stair training;ROM (range of motion);transfer training  -ER               User Key  (r) = Recorded By, (t) = Taken By, (c) = Cosigned By      Initials Name Provider Type    ER Naomi Rodriguez, PT Physical Therapist                   Clinical Impression       Row Name 12/30/23 1309          Pain    Pretreatment Pain Rating 7/10  -ER     Posttreatment Pain Rating 7/10  -ER     Pain Location generalized  -ER      Pain Location - chest  -ER     Pain Intervention(s) Rest;Repositioned;Ambulation/increased activity  -ER       Row Name 12/30/23 2229          Plan of Care Review    Plan of Care Reviewed With patient  -ER     Outcome Evaluation Kristian Chandra is a 48 year old male POD1 s/p CABGx7. Pt. is independent at baseline, lives with his wife in a one level home with 2 DARIN. He does not use AD at baseline. He reports no hx of falls. Today, pt. UIC at arrival of PT. He performs STS with CGAx2, and ambulates ~40 ft with CGAx2, extra assistance necessary for line management. Pt. reporting difficulty taking a full breath throughout, nursing aware and educated patient on appropriate breath control to avoid bearing down. PT recommending home with assist at discharge once medically stable.  -ER       Row Name 12/30/23 2783          Therapy Assessment/Plan (PT)    Criteria for Skilled Interventions Met (PT) yes  -ER     Therapy Frequency (PT) daily  -ER               User Key  (r) = Recorded By, (t) = Taken By, (c) = Cosigned By      Initials Name Provider Type    ER Naomi Rodriguez, PT Physical Therapist                   Outcome Measures       Row Name 12/30/23 2602          How much help from another person do you currently need...    Turning from your back to your side while in flat bed without using bedrails? 3  -ER     Moving from lying on back to sitting on the side of a flat bed without bedrails? 3  -ER     Moving to and from a bed to a chair (including a wheelchair)? 3  -ER     Standing up from a chair using your arms (e.g., wheelchair, bedside chair)? 3  -ER     Climbing 3-5 steps with a railing? 3  -ER     To walk in hospital room? 3  -ER     AM-PAC 6 Clicks Score (PT) 18  -ER     Highest Level of Mobility Goal 6 --> Walk 10 steps or more  -ER       Row Name 12/30/23 0091          Functional Assessment    Outcome Measure Options AM-PAC 6 Clicks Basic Mobility (PT)  -ER               User Key  (r) = Recorded By, (t) = Taken  By, (c) = Cosigned By      Initials Name Provider Type    ER Naomi Rodriguez, PT Physical Therapist                                 Physical Therapy Education       Title: PT OT SLP Therapies (Done)       Topic: Physical Therapy (Done)       Point: Mobility training (Done)       Learning Progress Summary             Patient Acceptance, E, VU by ER at 12/30/2023 1323                         Point: Home exercise program (Done)       Learning Progress Summary             Patient Acceptance, E, VU by ER at 12/30/2023 1323                         Point: Body mechanics (Done)       Learning Progress Summary             Patient Acceptance, E, VU by ER at 12/30/2023 1323                         Point: Precautions (Done)       Learning Progress Summary             Patient Acceptance, E, VU by ER at 12/30/2023 1323                                         User Key       Initials Effective Dates Name Provider Type Discipline    ER 10/15/23 -  Naomi Rodriguez, PT Physical Therapist PT                  PT Recommendation and Plan  Planned Therapy Interventions (PT): balance training, bed mobility training, gait training, home exercise program, patient/family education, strengthening, stair training, ROM (range of motion), transfer training  Plan of Care Reviewed With: patient  Outcome Evaluation: Kristian Chandra is a 48 year old male POD1 s/p CABGx7. Pt. is independent at baseline, lives with his wife in a one level home with 2 DARIN. He does not use AD at baseline. He reports no hx of falls. Today, pt. UIC at arrival of PT. He performs STS with CGAx2, and ambulates ~40 ft with CGAx2, extra assistance necessary for line management. Pt. reporting difficulty taking a full breath throughout, nursing aware and educated patient on appropriate breath control to avoid bearing down. PT recommending home with assist at discharge once medically stable.     Time Calculation:         PT Charges       Row Name 12/30/23 1324             Time Calculation     Start Time 1028  -ER      Stop Time 1100  -ER      Time Calculation (min) 32 min  -ER      PT Received On 12/30/23  -ER      PT - Next Appointment 12/31/23  -ER      PT Goal Re-Cert Due Date 01/13/24  -ER         Time Calculation- PT    Total Timed Code Minutes- PT 23 minute(s)  -ER         Timed Charges    12462 - PT Therapeutic Activity Minutes 23  -ER         Untimed Charges    PT Eval/Re-eval Minutes 9  -ER         Total Minutes    Timed Charges Total Minutes 23  -ER      Untimed Charges Total Minutes 9  -ER       Total Minutes 32  -ER                User Key  (r) = Recorded By, (t) = Taken By, (c) = Cosigned By      Initials Name Provider Type    ER Naomi Rodriguez, PT Physical Therapist                  Therapy Charges for Today       Code Description Service Date Service Provider Modifiers Qty    80473178467 HC PT THERAPEUTIC ACT EA 15 MIN 12/30/2023 Naomi Rodriguez, PT GP 2    51632282237 HC PT EVAL MOD COMPLEXITY 3 12/30/2023 Naomi Rodriguez, PT GP 1            PT G-Codes  Outcome Measure Options: AM-PAC 6 Clicks Basic Mobility (PT)  AM-PAC 6 Clicks Score (PT): 18  PT Discharge Summary  Anticipated Discharge Disposition (PT): home, home with assist, home with 24/7 care    Naomi Rodriguez PT  12/30/2023

## 2023-12-31 ENCOUNTER — APPOINTMENT (OUTPATIENT)
Dept: GENERAL RADIOLOGY | Facility: HOSPITAL | Age: 48
End: 2023-12-31
Payer: MEDICAID

## 2023-12-31 LAB
ANION GAP SERPL CALCULATED.3IONS-SCNC: 11 MMOL/L (ref 5–15)
BUN SERPL-MCNC: 10 MG/DL (ref 6–20)
BUN/CREAT SERPL: 11 (ref 7–25)
CALCIUM SPEC-SCNC: 8.5 MG/DL (ref 8.6–10.5)
CHLORIDE SERPL-SCNC: 104 MMOL/L (ref 98–107)
CO2 SERPL-SCNC: 23 MMOL/L (ref 22–29)
CREAT SERPL-MCNC: 0.91 MG/DL (ref 0.76–1.27)
DEPRECATED RDW RBC AUTO: 46.6 FL (ref 37–54)
EGFRCR SERPLBLD CKD-EPI 2021: 103.3 ML/MIN/1.73
ERYTHROCYTE [DISTWIDTH] IN BLOOD BY AUTOMATED COUNT: 14.4 % (ref 12.3–15.4)
GLUCOSE BLDC GLUCOMTR-MCNC: 144 MG/DL (ref 70–130)
GLUCOSE BLDC GLUCOMTR-MCNC: 146 MG/DL (ref 70–130)
GLUCOSE BLDC GLUCOMTR-MCNC: 155 MG/DL (ref 70–130)
GLUCOSE BLDC GLUCOMTR-MCNC: 161 MG/DL (ref 70–130)
GLUCOSE SERPL-MCNC: 170 MG/DL (ref 65–99)
HCT VFR BLD AUTO: 40.2 % (ref 37.5–51)
HGB BLD-MCNC: 13.7 G/DL (ref 13–17.7)
MCH RBC QN AUTO: 30 PG (ref 26.6–33)
MCHC RBC AUTO-ENTMCNC: 34.1 G/DL (ref 31.5–35.7)
MCV RBC AUTO: 88 FL (ref 79–97)
PLATELET # BLD AUTO: 212 10*3/MM3 (ref 140–450)
PMV BLD AUTO: 9.3 FL (ref 6–12)
POTASSIUM SERPL-SCNC: 4 MMOL/L (ref 3.5–5.2)
QT INTERVAL: 346 MS
QT INTERVAL: 389 MS
QTC INTERVAL: 441 MS
QTC INTERVAL: 449 MS
RBC # BLD AUTO: 4.57 10*6/MM3 (ref 4.14–5.8)
SODIUM SERPL-SCNC: 138 MMOL/L (ref 136–145)
WBC NRBC COR # BLD AUTO: 12.12 10*3/MM3 (ref 3.4–10.8)

## 2023-12-31 PROCEDURE — 80048 BASIC METABOLIC PNL TOTAL CA: CPT | Performed by: NURSE PRACTITIONER

## 2023-12-31 PROCEDURE — 97530 THERAPEUTIC ACTIVITIES: CPT

## 2023-12-31 PROCEDURE — 71045 X-RAY EXAM CHEST 1 VIEW: CPT

## 2023-12-31 PROCEDURE — 25010000002 ENOXAPARIN PER 10 MG: Performed by: NURSE PRACTITIONER

## 2023-12-31 PROCEDURE — 82948 REAGENT STRIP/BLOOD GLUCOSE: CPT

## 2023-12-31 PROCEDURE — 93010 ELECTROCARDIOGRAM REPORT: CPT | Performed by: INTERNAL MEDICINE

## 2023-12-31 PROCEDURE — 25010000002 FUROSEMIDE PER 20 MG: Performed by: NURSE PRACTITIONER

## 2023-12-31 PROCEDURE — 85027 COMPLETE CBC AUTOMATED: CPT | Performed by: NURSE PRACTITIONER

## 2023-12-31 PROCEDURE — 25010000002 CEFAZOLIN IN DEXTROSE 2-4 GM/100ML-% SOLUTION: Performed by: NURSE PRACTITIONER

## 2023-12-31 PROCEDURE — 93005 ELECTROCARDIOGRAM TRACING: CPT | Performed by: NURSE PRACTITIONER

## 2023-12-31 PROCEDURE — 63710000001 INSULIN LISPRO (HUMAN) PER 5 UNITS: Performed by: NURSE PRACTITIONER

## 2023-12-31 RX ORDER — LIDOCAINE 4 G/G
1 PATCH TOPICAL
Status: DISCONTINUED | OUTPATIENT
Start: 2023-12-31 | End: 2023-12-31

## 2023-12-31 RX ORDER — LIDOCAINE 4 G/G
2 PATCH TOPICAL
Status: DISCONTINUED | OUTPATIENT
Start: 2024-01-01 | End: 2023-12-31

## 2023-12-31 RX ORDER — FUROSEMIDE 10 MG/ML
40 INJECTION INTRAMUSCULAR; INTRAVENOUS ONCE
Status: COMPLETED | OUTPATIENT
Start: 2023-12-31 | End: 2023-12-31

## 2023-12-31 RX ORDER — LIDOCAINE 4 G/G
2 PATCH TOPICAL
Status: DISCONTINUED | OUTPATIENT
Start: 2023-12-31 | End: 2024-01-03 | Stop reason: HOSPADM

## 2023-12-31 RX ORDER — POTASSIUM CHLORIDE 750 MG/1
20 TABLET, FILM COATED, EXTENDED RELEASE ORAL ONCE
Status: COMPLETED | OUTPATIENT
Start: 2023-12-31 | End: 2023-12-31

## 2023-12-31 RX ORDER — LISINOPRIL 5 MG/1
5 TABLET ORAL
Status: DISCONTINUED | OUTPATIENT
Start: 2023-12-31 | End: 2024-01-03 | Stop reason: HOSPADM

## 2023-12-31 RX ORDER — GABAPENTIN 300 MG/1
300 CAPSULE ORAL EVERY 8 HOURS SCHEDULED
Status: DISCONTINUED | OUTPATIENT
Start: 2023-12-31 | End: 2024-01-03 | Stop reason: HOSPADM

## 2023-12-31 RX ORDER — METOPROLOL TARTRATE 50 MG/1
50 TABLET, FILM COATED ORAL EVERY 12 HOURS SCHEDULED
Status: DISCONTINUED | OUTPATIENT
Start: 2023-12-31 | End: 2024-01-03

## 2023-12-31 RX ADMIN — FUROSEMIDE 40 MG: 10 INJECTION, SOLUTION INTRAMUSCULAR; INTRAVENOUS at 10:31

## 2023-12-31 RX ADMIN — GABAPENTIN 300 MG: 300 CAPSULE ORAL at 12:23

## 2023-12-31 RX ADMIN — METOPROLOL TARTRATE 50 MG: 50 TABLET, FILM COATED ORAL at 21:20

## 2023-12-31 RX ADMIN — SENNOSIDES AND DOCUSATE SODIUM 2 TABLET: 50; 8.6 TABLET ORAL at 21:20

## 2023-12-31 RX ADMIN — ACETAMINOPHEN 650 MG: 325 TABLET, FILM COATED ORAL at 08:27

## 2023-12-31 RX ADMIN — LIDOCAINE 2 PATCH: 4 PATCH TOPICAL at 16:47

## 2023-12-31 RX ADMIN — ASPIRIN 81 MG: 81 TABLET, COATED ORAL at 08:27

## 2023-12-31 RX ADMIN — ALPRAZOLAM 0.25 MG: 0.25 TABLET ORAL at 00:30

## 2023-12-31 RX ADMIN — ACETAMINOPHEN 650 MG: 325 TABLET, FILM COATED ORAL at 12:26

## 2023-12-31 RX ADMIN — LIDOCAINE 1 PATCH: 4 PATCH TOPICAL at 03:54

## 2023-12-31 RX ADMIN — MUPIROCIN 1 APPLICATION: 20 OINTMENT TOPICAL at 21:20

## 2023-12-31 RX ADMIN — PANTOPRAZOLE SODIUM 40 MG: 40 TABLET, DELAYED RELEASE ORAL at 06:16

## 2023-12-31 RX ADMIN — BISACODYL 10 MG: 5 TABLET, COATED ORAL at 08:27

## 2023-12-31 RX ADMIN — ATORVASTATIN CALCIUM 40 MG: 20 TABLET, FILM COATED ORAL at 21:20

## 2023-12-31 RX ADMIN — INSULIN LISPRO 2 UNITS: 100 INJECTION, SOLUTION INTRAVENOUS; SUBCUTANEOUS at 18:20

## 2023-12-31 RX ADMIN — OXYCODONE HYDROCHLORIDE 10 MG: 5 TABLET ORAL at 21:20

## 2023-12-31 RX ADMIN — GABAPENTIN 300 MG: 300 CAPSULE ORAL at 21:20

## 2023-12-31 RX ADMIN — METOPROLOL TARTRATE 50 MG: 50 TABLET, FILM COATED ORAL at 08:27

## 2023-12-31 RX ADMIN — OXYCODONE HYDROCHLORIDE 10 MG: 5 TABLET ORAL at 08:27

## 2023-12-31 RX ADMIN — ENOXAPARIN SODIUM 40 MG: 100 INJECTION SUBCUTANEOUS at 16:47

## 2023-12-31 RX ADMIN — OXYCODONE HYDROCHLORIDE 10 MG: 5 TABLET ORAL at 04:31

## 2023-12-31 RX ADMIN — OXYCODONE HYDROCHLORIDE 10 MG: 5 TABLET ORAL at 16:46

## 2023-12-31 RX ADMIN — INSULIN LISPRO 2 UNITS: 100 INJECTION, SOLUTION INTRAVENOUS; SUBCUTANEOUS at 12:23

## 2023-12-31 RX ADMIN — POTASSIUM CHLORIDE 20 MEQ: 750 TABLET, EXTENDED RELEASE ORAL at 10:31

## 2023-12-31 RX ADMIN — CYCLOBENZAPRINE 10 MG: 10 TABLET, FILM COATED ORAL at 00:30

## 2023-12-31 RX ADMIN — CEFAZOLIN SODIUM 2 G: 2 INJECTION, SOLUTION INTRAVENOUS at 03:25

## 2023-12-31 RX ADMIN — LISINOPRIL 5 MG: 5 TABLET ORAL at 10:31

## 2023-12-31 RX ADMIN — OXYCODONE HYDROCHLORIDE 10 MG: 5 TABLET ORAL at 00:30

## 2023-12-31 RX ADMIN — OXYCODONE HYDROCHLORIDE 10 MG: 5 TABLET ORAL at 12:26

## 2023-12-31 NOTE — PLAN OF CARE
Goal Outcome Evaluation:  Plan of Care Reviewed With: patient        Progress: no change  Outcome Evaluation: A/O, pain control issues, requiring frequent medications, physician notified, lidocaine patch ordered, air leak noted in CT, physician aware, mesa to gravity, RIJ, pacer turned off, up in chair, VSS.

## 2023-12-31 NOTE — NURSING NOTE
Patient states pain is out of control, has spasms in his back, medication given without relief.  Dr. Delgado notified, order received for lidocaine patch.

## 2023-12-31 NOTE — PROGRESS NOTES
" LOS: 3 days   Patient Care Team:  Bonita Wong FNP as PCP - General (Nurse Practitioner)    Chief Complaint:   Post-op follow-up, s/p CABG    Subjective:  Symptoms:  No shortness of breath or chest pain.    Diet:  Poor intake.  No nausea or vomiting.    Activity level: Impaired due to pain.    Pain:  He complains of pain that is mild.  He reports pain is improving.  Pain is well controlled and requiring pain medication.      Sitting up in chair. Reports he has chronic neck and back muscle spasms that seem to be worse since surgery. Feels better when up in the chair and with lidocaine patch.    Vital Signs  Temp:  [98.2 °F (36.8 °C)-99.1 °F (37.3 °C)] 98.6 °F (37 °C)  Heart Rate:  [] 101  Resp:  [20-22] 20  BP: (107-149)/(72-92) 138/81      12/28/23  1451 12/29/23  0500 12/31/23  0507   Weight: 111 kg (243 lb 11.2 oz) 109 kg (241 lb 2.9 oz) 113 kg (248 lb 11.2 oz)     Body mass index is 35.68 kg/m².    Intake/Output Summary (Last 24 hours) at 12/31/2023 0929  Last data filed at 12/31/2023 0824  Gross per 24 hour   Intake 745 ml   Output 1595 ml   Net -850 ml     I/O this shift:  In: 240 [P.O.:240]  Out: 425 [Urine:425]    Chest tube drainage last 8 hours: Mediastinals 70 ml ; Pleural 90 ml    Objective:  General Appearance:  Comfortable and in no acute distress.    Vital signs: (most recent): Blood pressure 138/81, pulse 101, temperature 98.6 °F (37 °C), temperature source Oral, resp. rate 20, height 177.8 cm (70\"), weight 113 kg (248 lb 11.2 oz), SpO2 94%.  Vital signs are normal.  No fever.    Output: Producing urine.    Lungs:  Normal effort and normal respiratory rate.  He is not in respiratory distress.    Neurological: Patient is alert and oriented to person, place and time.    Skin:  Warm and dry.  (Sternal incision with drsg in place, CDI)          Results Review:      WBC WBC   Date Value Ref Range Status   12/31/2023 12.12 (H) 3.40 - 10.80 10*3/mm3 Final   12/30/2023 11.69 (H) 3.40 - 10.80 " 10*3/mm3 Final   12/29/2023 13.69 (H) 3.40 - 10.80 10*3/mm3 Final   12/29/2023 14.65 (H) 3.40 - 10.80 10*3/mm3 Final   12/28/2023 6.55 3.40 - 10.80 10*3/mm3 Final      HGB Hemoglobin   Date Value Ref Range Status   12/31/2023 13.7 13.0 - 17.7 g/dL Final   12/30/2023 13.9 13.0 - 17.7 g/dL Final   12/29/2023 15.0 13.0 - 17.7 g/dL Final   12/29/2023 14.3 13.0 - 17.7 g/dL Final   12/29/2023 11.2 (L) 12.0 - 17.0 g/dL Final   12/29/2023 11.2 (L) 12.0 - 17.0 g/dL Final   12/29/2023 11.6 (L) 12.0 - 17.0 g/dL Final   12/29/2023 11.2 (L) 12.0 - 17.0 g/dL Final   12/29/2023 11.9 (L) 12.0 - 17.0 g/dL Final   12/29/2023 11.2 (L) 12.0 - 17.0 g/dL Final   12/29/2023 11.2 (L) 12.0 - 17.0 g/dL Final   12/29/2023 10.9 (L) 12.0 - 17.0 g/dL Final   12/29/2023 13.6 12.0 - 17.0 g/dL Final   12/28/2023 15.4 13.0 - 17.7 g/dL Final      HCT Hematocrit   Date Value Ref Range Status   12/31/2023 40.2 37.5 - 51.0 % Final   12/30/2023 40.4 37.5 - 51.0 % Final   12/29/2023 42.7 37.5 - 51.0 % Final   12/29/2023 41.1 37.5 - 51.0 % Final   12/29/2023 33 (L) 38 - 51 % Final   12/29/2023 33 (L) 38 - 51 % Final   12/29/2023 34 (L) 38 - 51 % Final   12/29/2023 33 (L) 38 - 51 % Final   12/29/2023 35 (L) 38 - 51 % Final   12/29/2023 33 (L) 38 - 51 % Final   12/29/2023 33 (L) 38 - 51 % Final   12/29/2023 32 (L) 38 - 51 % Final   12/29/2023 40 38 - 51 % Final   12/28/2023 45.2 37.5 - 51.0 % Final      Platelets Platelets   Date Value Ref Range Status   12/31/2023 212 140 - 450 10*3/mm3 Final   12/30/2023 224 140 - 450 10*3/mm3 Final   12/29/2023 222 140 - 450 10*3/mm3 Final   12/29/2023 192 140 - 450 10*3/mm3 Final   12/28/2023 261 140 - 450 10*3/mm3 Final        PT/INR:    Protime   Date Value Ref Range Status   12/30/2023 15.6 (H) 11.7 - 14.2 Seconds Final   12/29/2023 17.0 (H) 11.7 - 14.2 Seconds Final   12/28/2023 14.3 (H) 11.7 - 14.2 Seconds Final   /  INR   Date Value Ref Range Status   12/30/2023 1.22 (H) 0.90 - 1.10 Final   12/29/2023 1.37 (H) 0.90  - 1.10 Final   12/28/2023 1.10 0.90 - 1.10 Final       Sodium Sodium   Date Value Ref Range Status   12/31/2023 138 136 - 145 mmol/L Final   12/30/2023 142 136 - 145 mmol/L Final   12/29/2023 141 136 - 145 mmol/L Final   12/29/2023 142 136 - 145 mmol/L Final   12/28/2023 136 136 - 145 mmol/L Final      Potassium Potassium   Date Value Ref Range Status   12/31/2023 4.0 3.5 - 5.2 mmol/L Final   12/30/2023 4.1 3.5 - 5.2 mmol/L Final   12/29/2023 4.8 3.5 - 5.2 mmol/L Final   12/29/2023 4.8 3.5 - 5.2 mmol/L Final   12/28/2023 4.1 3.5 - 5.2 mmol/L Final      Chloride Chloride   Date Value Ref Range Status   12/31/2023 104 98 - 107 mmol/L Final   12/30/2023 110 (H) 98 - 107 mmol/L Final   12/29/2023 109 (H) 98 - 107 mmol/L Final   12/29/2023 108 (H) 98 - 107 mmol/L Final   12/28/2023 105 98 - 107 mmol/L Final      Bicarbonate CO2   Date Value Ref Range Status   12/31/2023 23.0 22.0 - 29.0 mmol/L Final   12/30/2023 22.2 22.0 - 29.0 mmol/L Final   12/29/2023 23.1 22.0 - 29.0 mmol/L Final   12/29/2023 23.8 22.0 - 29.0 mmol/L Final   12/28/2023 21.2 (L) 22.0 - 29.0 mmol/L Final      BUN BUN   Date Value Ref Range Status   12/31/2023 10 6 - 20 mg/dL Final   12/30/2023 9 6 - 20 mg/dL Final   12/29/2023 9 6 - 20 mg/dL Final   12/29/2023 10 6 - 20 mg/dL Final   12/28/2023 10 6 - 20 mg/dL Final      Creatinine Creatinine   Date Value Ref Range Status   12/31/2023 0.91 0.76 - 1.27 mg/dL Final   12/30/2023 0.82 0.76 - 1.27 mg/dL Final   12/29/2023 1.03 0.76 - 1.27 mg/dL Final   12/29/2023 1.07 0.76 - 1.27 mg/dL Final   12/28/2023 1.05 0.76 - 1.27 mg/dL Final      Calcium Calcium   Date Value Ref Range Status   12/31/2023 8.5 (L) 8.6 - 10.5 mg/dL Final   12/30/2023 8.2 (L) 8.6 - 10.5 mg/dL Final   12/29/2023 8.6 8.6 - 10.5 mg/dL Final   12/29/2023 8.3 (L) 8.6 - 10.5 mg/dL Final   12/28/2023 8.9 8.6 - 10.5 mg/dL Final      Magnesium Magnesium   Date Value Ref Range Status   12/30/2023 2.1 1.6 - 2.6 mg/dL Final   12/29/2023 2.3 1.6 -  2.6 mg/dL Final   12/29/2023 2.8 (H) 1.6 - 2.6 mg/dL Final   12/28/2023 2.0 1.6 - 2.6 mg/dL Final        aspirin, 81 mg, Oral, Daily  atorvastatin, 40 mg, Oral, Nightly  chlorhexidine, 15 mL, Mouth/Throat, Q12H  enoxaparin, 40 mg, Subcutaneous, Q24H  gabapentin, 300 mg, Oral, Q8H  insulin lispro, 2-9 Units, Subcutaneous, 4x Daily AC & at Bedtime  Lidocaine, 1 patch, Transdermal, Q24H  lisinopril, 5 mg, Oral, Q24H  metoprolol tartrate, 50 mg, Oral, Q12H  mupirocin, , Each Nare, BID  pantoprazole, 40 mg, Oral, QAM  senna-docusate sodium, 2 tablet, Oral, Nightly             CAD (coronary artery disease)    Tobacco dependence    Dyslipidemia      Assessment & Plan  Multivessel CAD; s/p CABG x7, with atriclip POD#2 [place]  Hypertension  Hyperlipidemia  Obesity -- BMI 34.61  Newly diagnosed DM -- pt reports new diagnosis last week, A1C ia 8.2    POD2  Patient sitting up in chair  Pain and spasms are better controlled this morning, continue current regimen  SR to ST on tele monitor 90-100s, increased BB to lopressor 50 mg BID  Mediastinal CT with air leak noted yesterday, unable to appreciate this am, no pneumothorax seen on CXR, keep CT one more day  Remains on O2, wean as tolerated  Weight is up 3 kg, will give IV diuretics, replete electroytes  Encourage mobilization, increase activity  Encourage pulmonary toilet, discussed importance of IS   Discontinue central line and mesa catheter today    FLORI Gold  12/31/23  09:29 EST

## 2023-12-31 NOTE — THERAPY TREATMENT NOTE
Patient Name: Kristian Chandra  : 1975    MRN: [id number]                              Today's Date: 2023       Admit Date: 2023    Visit Dx:     ICD-10-CM ICD-9-CM   1. S/P CABG (coronary artery bypass graft)  Z95.1 V45.81     Patient Active Problem List   Diagnosis    Chest pain    CAD (coronary artery disease)    Tobacco dependence    Dyslipidemia     Past Medical History:   Diagnosis Date    Abnormal liver function tests     elevated    Acute bronchitis     Asthma, allergic     Blastomycosis     Chronic obstructive lung disease     Chronic persistent hepatitis     Coronary arteriosclerosis     post stent     Diabetes mellitus     Diarrhea     Dyspnea on exertion     Epigastric pain     Essential hypertension     Generalized abdominal pain     GERD (gastroesophageal reflux disease)     with esophagitis    H/O echocardiogram     Normal left ventricular systolic function with EF of 65%. Concentric left ventricular hypertrophy. 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 Postdilated using a noncompliant balloon 2.61e98wn balloon 2011        CARDIAC CATHETERIZATION N/A 2017    Procedure: Left Heart Cath;  Surgeon: Melissa Tineo MD;  Location: [facility] INVASIVE LOCATION;  Service:     COLONOSCOPY      External and internal hemorrhoids.Medium sized lipoma at the ileocecal valve. Biopsied.Several biopsies obtained in the entire colon and in the terminal ileum. 2016        CORONARY STENT PLACEMENT       ENDOSCOPY      Internal & external hemorrhoids found. 12/21/2011      ENDOSCOPY      Mildly severe esophagitis. Gastritis. Normal duodenum. Biopsied. Several biopsies obtained in the lower third of the esophagus.Several biopsies obtained in the gastric antrum. 07/08/2016        DE RT/LT HEART CATHETERS N/A 06/16/2017    Procedure: Percutaneous Coronary Intervention;  Surgeon: Melissa Tineo MD;  Location: [facility] CATH INVASIVE LOCATION;  Service: Cardiovascular      General Information       Row Name 12/31/23 1440          Physical Therapy Time and Intention    Document Type therapy note (daily note)  -ER     Mode of Treatment individual therapy;physical therapy  -ER       Row Name 12/31/23 1440          General Information    Patient Profile Reviewed yes  -ER     Existing Precautions/Restrictions cardiac;sternal  -ER       Row Name 12/31/23 1440          Cognition    Orientation Status (Cognition) oriented x 4  -ER       Row Name 12/31/23 1440          Safety Issues, Functional Mobility    Impairments Affecting Function (Mobility) endurance/activity tolerance;pain;shortness of breath  -ER     Comment, Safety Issues/Impairments (Mobility) Pt. encouraged to relax shoulders, maintain breath control, use of heart hugger and heart pillow for bracing while coughing  -ER               User Key  (r) = Recorded By, (t) = Taken By, (c) = Cosigned By      Initials Name Provider Type    ER Naomi Rodriguez, PT Physical Therapist                   Mobility       Row Name 12/31/23 1440          Bed Mobility    Comment, (Bed Mobility) UIC  -ER       Row Name 12/31/23 1440          Sit-Stand Transfer    Sit-Stand [place] (Transfers) contact guard;2 person assist;1 person to manage equipment  -ER     Assistive Device (Sit-Stand Transfers) other (see comments)  no AD  -ER       Row Name 12/31/23 1440          Gait/Stairs (Locomotion)    [place] Level (Gait) contact guard;2 person assist;1 person to manage equipment  -ER      Patient was able to Ambulate yes  -ER     Distance in Feet (Gait) 40  -ER     Deviations/Abnormal Patterns (Gait) stride length decreased;weight shifting decreased;jer[place] decreased  -ER     Bilateral Gait Deviations heel strike decreased  -ER               User Key  (r) = Recorded By, (t) = Taken By, (c) = Cosigned By      Initials Name Provider Type    ER Naomi Rodriguez, PT Physical Therapist                   Obj/Interventions       Row Name 12/31/23 1441          Balance    Balance Assessment sitting static balance;standing static balance  -ER     Static Sitting Balance standby assist;supervision  -ER     Position, Sitting Balance unsupported;sitting in chair  -ER     Static Standing Balance contact guard  -ER     Position/Device Used, Standing Balance other (see comments)  no AD  -ER     Balance Interventions sitting;standing;supported;dynamic;static  -ER               User Key  (r) = Recorded By, (t) = Taken By, (c) = Cosigned By      Initials Name Provider Type    ER Naomi Rodriguez, PT Physical Therapist                   Goals/Plan    No documentation.                  Clinical Impression       Row Name 12/31/23 1441          Pain    Pretreatment Pain Rating 5/10  -ER     Posttreatment Pain Rating 5/10  -ER     Pain Location generalized  -ER     Pain Location - back  -ER     Pain Intervention(s) Rest;Repositioned;Ambulation/increased activity  -ER       Row Name 12/31/23 1441          Plan of Care Review    Plan of Care Reviewed With patient  -ER     Outcome Evaluation Kristian Chandra is a 48 year old male seen for physical therapy treatment s/p CABGx7. Today, pt. UIC at arrival of PT. Pt. reporting reduced pain in back compared to last session stating that his main concern is his ability to cough. pt. educated on use of heart hugger and bracing w/ heart pillow. Pt. performs STS with CGA x2, one person for line management. Pt. able to ambulate ~40 ft with CGAx2 and no AD. Pt. requiring intermittent standing periods  to clear throat. Pt. left UIC with nursing aide at end of session, all neesd met and within reach. PT recommending home with asssist/ HH at d/c once medically stable.  -ER       Row Name 12/31/23 1441          Therapy Assessment/Plan (PT)    Criteria for Skilled Interventions Met (PT) yes  -ER     Therapy Frequency (PT) daily  -ER       Row Name 12/31/23 1441          Positioning and Restraints    Pre-Treatment Position sitting in chair/recliner  -ER     Post Treatment Position chair  -ER     In Chair notified nsg;call light within reach;encouraged to call for assist;exit alarm on  -ER               User Key  (r) = Recorded By, (t) = Taken By, (c) = Cosigned By      Initials Name Provider Type    Naomi Easley PT Physical Therapist                   Outcome Measures       Row Name 12/31/23 1445 12/31/23 0824       How much help from another person do you currently need...    Turning from your back to your side while in flat bed without using bedrails? 3  -ER 3  -SD    Moving from lying on back to sitting on the side of a flat bed without bedrails? 3  -ER 3  -SD    Moving to and from a bed to a chair (including a wheelchair)? 3  -ER 3  -SD    Standing up from a chair using your arms (e.g., wheelchair, bedside chair)? 3  -ER 3  -SD    Climbing 3-5 steps with a railing? 2  -ER 3  -SD    To walk in hospital room? 3  -ER 3  -SD    AM-PAC 6 Clicks Score (PT) 17  -ER 18  -SD    Highest Level of Mobility Goal 5 --> Static standing  -ER 6 --> Walk 10 steps or more  -SD      Row Name 12/31/23 1445          Functional Assessment    Outcome Measure Options AM-PAC 6 Clicks Basic Mobility (PT)  -ER               User Key  (r) = Recorded By, (t) = Taken By, (c) = Cosigned By      Initials Name Provider Type    Kenya Melendrze, RN Registered Nurse    Naomi Easley PT Physical Therapist                                 Physical Therapy Education       Title: PT OT SLP Therapies (Done)       Topic: Physical Therapy (Done)        Point: Mobility training (Done)       Learning Progress Summary             Patient Acceptance, E, VU by ER at 12/31/2023 1445    Acceptance, E, VU by ER at 12/30/2023 1323                         Point: Home exercise program (Done)       Learning Progress Summary             Patient Acceptance, E, VU by ER at 12/31/2023 1445    Acceptance, E, VU by ER at 12/30/2023 1323                         Point: Body mechanics (Done)       Learning Progress Summary             Patient Acceptance, E, VU by ER at 12/31/2023 1445    Acceptance, E, VU by ER at 12/30/2023 1323                         Point: Precautions (Done)       Learning Progress Summary             Patient Acceptance, E, VU by ER at 12/31/2023 1445    Acceptance, E, VU by ER at 12/30/2023 1323                                         User Key       Initials Effective Dates Name Provider Type Discipline    ER 10/15/23 -  Naomi Rodriguez, PT Physical Therapist PT                  PT Recommendation and Plan  Planned Therapy Interventions (PT): balance training, bed mobility training, gait training, home exercise program, patient/family education, strengthening, stair training, ROM (range of motion), transfer training  Plan of Care Reviewed With: patient  Outcome Evaluation: Kristian Chandra is a 48 year old male seen for physical therapy treatment s/p CABGx7. Today, pt. UIC at arrival of PT. Pt. reporting reduced pain in back compared to last session stating that his main concern is his ability to cough. pt. educated on use of heart hugger and bracing w/ heart pillow. Pt. performs STS with CGA x2, one person for line management. Pt. able to ambulate ~40 ft with CGAx2 and no AD. Pt. requiring intermittent standing periods to clear throat. Pt. left UIC with nursing aide at end of session, all neesd met and within reach. PT recommending home with asssist/ HH at d/c once medically stable.     Time Calculation:         PT Charges       Row Name 12/31/23 1445             Time  Calculation    Start Time 1339  -ER      Stop Time 1359  -ER      Time Calculation (min) 20 min  -ER      PT Received On 12/31/23  -ER      PT - Next Appointment 01/01/24  -ER         Time Calculation- PT    Total Timed Code Minutes- PT 20 minute(s)  -ER         Timed Charges    21993 - PT Therapeutic Activity Minutes 20  -ER         Total Minutes    Timed Charges Total Minutes 20  -ER       Total Minutes 20  -ER                User Key  (r) = Recorded By, (t) = Taken By, (c) = Cosigned By      Initials Name Provider Type    ER Naomi Rodriguez, PT Physical Therapist                  Therapy Charges for Today       Code Description Service Date Service Provider Modifiers Qty    21930682212 HC PT THERAPEUTIC ACT EA 15 MIN 12/30/2023 Naomi Rodriguez, PT GP 2    54252834596 HC PT EVAL MOD COMPLEXITY 3 12/30/2023 Naomi Rodriguez, PT GP 1    50399588531 HC PT THERAPEUTIC ACT EA 15 MIN 12/31/2023 Naomi Rodriguez, PT GP 1            PT G-Codes  Outcome Measure Options: AM-PAC 6 Clicks Basic Mobility (PT)  AM-PAC 6 Clicks Score (PT): 17  PT Discharge Summary  Anticipated Discharge Disposition (PT): home, home with assist, home with 24/7 care, home with home health    Naomi Rodriguez PT  12/31/2023

## 2023-12-31 NOTE — PLAN OF CARE
Goal Outcome Evaluation:  Plan of Care Reviewed With: patient           Outcome Evaluation: Kristian Chandra is a 48 year old male seen for physical therapy treatment s/p CABGx7. Today, pt. UIC at arrival of PT. Pt. reporting reduced pain in back compared to last session stating that his main concern is his ability to cough. pt. educated on use of heart hugger and bracing w/ heart pillow. Pt. performs STS with CGA x2, one person for line management. Pt. able to ambulate ~40 ft with CGAx2 and no AD. Pt. requiring intermittent standing periods to clear throat. Pt. left UIC with nursing aide at end of session, all neesd met and within reach. PT recommending home with asssist/ HH at d/c once medically stable.      Anticipated Discharge Disposition (PT): home, home with assist, home with 24/7 care, home with home health

## 2023-12-31 NOTE — PLAN OF CARE
Goal Outcome Evaluation:              Outcome Evaluation: S/p CABG. NSR on tele, all other VSS, weaned to RA and tolerating. IJ and mesa removed per orders. Temp pacer and chest tubes remain in place. Pain poorly controlled per pt. Gabapentin & lidocaine orders adjusted, oxycontin given q4h PRN. Ambulating x1 assist, refused walk with nursing but worked with PT this afternoon. C/o not being able to cough up secretions, educated on mobility and pulmonary toileting. Will continue with routine postop care and update as needed.

## 2024-01-01 ENCOUNTER — APPOINTMENT (OUTPATIENT)
Dept: GENERAL RADIOLOGY | Facility: HOSPITAL | Age: 49
End: 2024-01-01
Payer: MEDICAID

## 2024-01-01 LAB
ANION GAP SERPL CALCULATED.3IONS-SCNC: 10 MMOL/L (ref 5–15)
BH BB BLOOD EXPIRATION DATE: NORMAL
BH BB BLOOD EXPIRATION DATE: NORMAL
BH BB BLOOD TYPE BARCODE: 5100
BH BB BLOOD TYPE BARCODE: 5100
BH BB DISPENSE STATUS: NORMAL
BH BB DISPENSE STATUS: NORMAL
BH BB PRODUCT CODE: NORMAL
BH BB PRODUCT CODE: NORMAL
BH BB UNIT NUMBER: NORMAL
BH BB UNIT NUMBER: NORMAL
BUN SERPL-MCNC: 16 MG/DL (ref 6–20)
BUN/CREAT SERPL: 18.4 (ref 7–25)
CALCIUM SPEC-SCNC: 8.9 MG/DL (ref 8.6–10.5)
CHLORIDE SERPL-SCNC: 101 MMOL/L (ref 98–107)
CO2 SERPL-SCNC: 21 MMOL/L (ref 22–29)
CREAT SERPL-MCNC: 0.87 MG/DL (ref 0.76–1.27)
CROSSMATCH INTERPRETATION: NORMAL
CROSSMATCH INTERPRETATION: NORMAL
DEPRECATED RDW RBC AUTO: 45.8 FL (ref 37–54)
EGFRCR SERPLBLD CKD-EPI 2021: 105.8 ML/MIN/1.73
ERYTHROCYTE [DISTWIDTH] IN BLOOD BY AUTOMATED COUNT: 14.4 % (ref 12.3–15.4)
GLUCOSE BLDC GLUCOMTR-MCNC: 129 MG/DL (ref 70–130)
GLUCOSE BLDC GLUCOMTR-MCNC: 164 MG/DL (ref 70–130)
GLUCOSE BLDC GLUCOMTR-MCNC: 189 MG/DL (ref 70–130)
GLUCOSE BLDC GLUCOMTR-MCNC: 193 MG/DL (ref 70–130)
GLUCOSE SERPL-MCNC: 134 MG/DL (ref 65–99)
HCT VFR BLD AUTO: 40.2 % (ref 37.5–51)
HGB BLD-MCNC: 13.7 G/DL (ref 13–17.7)
MCH RBC QN AUTO: 29.8 PG (ref 26.6–33)
MCHC RBC AUTO-ENTMCNC: 34.1 G/DL (ref 31.5–35.7)
MCV RBC AUTO: 87.6 FL (ref 79–97)
PLATELET # BLD AUTO: 252 10*3/MM3 (ref 140–450)
PMV BLD AUTO: 9.5 FL (ref 6–12)
POTASSIUM SERPL-SCNC: 3.9 MMOL/L (ref 3.5–5.2)
RBC # BLD AUTO: 4.59 10*6/MM3 (ref 4.14–5.8)
SODIUM SERPL-SCNC: 132 MMOL/L (ref 136–145)
UNIT  ABO: NORMAL
UNIT  ABO: NORMAL
UNIT  RH: NORMAL
UNIT  RH: NORMAL
WBC NRBC COR # BLD AUTO: 11.74 10*3/MM3 (ref 3.4–10.8)

## 2024-01-01 PROCEDURE — 63710000001 INSULIN LISPRO (HUMAN) PER 5 UNITS: Performed by: NURSE PRACTITIONER

## 2024-01-01 PROCEDURE — 80048 BASIC METABOLIC PNL TOTAL CA: CPT | Performed by: NURSE PRACTITIONER

## 2024-01-01 PROCEDURE — 71045 X-RAY EXAM CHEST 1 VIEW: CPT

## 2024-01-01 PROCEDURE — 71046 X-RAY EXAM CHEST 2 VIEWS: CPT

## 2024-01-01 PROCEDURE — 85027 COMPLETE CBC AUTOMATED: CPT | Performed by: NURSE PRACTITIONER

## 2024-01-01 PROCEDURE — 82948 REAGENT STRIP/BLOOD GLUCOSE: CPT

## 2024-01-01 PROCEDURE — 25010000002 ENOXAPARIN PER 10 MG: Performed by: NURSE PRACTITIONER

## 2024-01-01 RX ORDER — POTASSIUM CHLORIDE 750 MG/1
20 TABLET, FILM COATED, EXTENDED RELEASE ORAL DAILY
Status: DISCONTINUED | OUTPATIENT
Start: 2024-01-01 | End: 2024-01-03 | Stop reason: HOSPADM

## 2024-01-01 RX ORDER — FUROSEMIDE 40 MG/1
40 TABLET ORAL DAILY
Status: DISCONTINUED | OUTPATIENT
Start: 2024-01-01 | End: 2024-01-03 | Stop reason: HOSPADM

## 2024-01-01 RX ORDER — POTASSIUM CHLORIDE 750 MG/1
20 TABLET, FILM COATED, EXTENDED RELEASE ORAL ONCE
Status: COMPLETED | OUTPATIENT
Start: 2024-01-01 | End: 2024-01-01

## 2024-01-01 RX ADMIN — ALPRAZOLAM 0.25 MG: 0.25 TABLET ORAL at 01:05

## 2024-01-01 RX ADMIN — METOPROLOL TARTRATE 50 MG: 50 TABLET, FILM COATED ORAL at 09:48

## 2024-01-01 RX ADMIN — OXYCODONE HYDROCHLORIDE 10 MG: 5 TABLET ORAL at 19:17

## 2024-01-01 RX ADMIN — OXYCODONE HYDROCHLORIDE 10 MG: 5 TABLET ORAL at 01:05

## 2024-01-01 RX ADMIN — GABAPENTIN 300 MG: 300 CAPSULE ORAL at 05:28

## 2024-01-01 RX ADMIN — CYCLOBENZAPRINE 10 MG: 10 TABLET, FILM COATED ORAL at 09:48

## 2024-01-01 RX ADMIN — POTASSIUM CHLORIDE 20 MEQ: 750 TABLET, EXTENDED RELEASE ORAL at 09:49

## 2024-01-01 RX ADMIN — LIDOCAINE 2 PATCH: 4 PATCH TOPICAL at 09:49

## 2024-01-01 RX ADMIN — METOPROLOL TARTRATE 50 MG: 50 TABLET, FILM COATED ORAL at 20:57

## 2024-01-01 RX ADMIN — MUPIROCIN 1 APPLICATION: 20 OINTMENT TOPICAL at 09:49

## 2024-01-01 RX ADMIN — FUROSEMIDE 40 MG: 40 TABLET ORAL at 09:48

## 2024-01-01 RX ADMIN — PANTOPRAZOLE SODIUM 40 MG: 40 TABLET, DELAYED RELEASE ORAL at 05:28

## 2024-01-01 RX ADMIN — CYCLOBENZAPRINE 10 MG: 10 TABLET, FILM COATED ORAL at 01:05

## 2024-01-01 RX ADMIN — GABAPENTIN 300 MG: 300 CAPSULE ORAL at 14:41

## 2024-01-01 RX ADMIN — POLYETHYLENE GLYCOL 3350 17 G: 17 POWDER, FOR SOLUTION ORAL at 14:42

## 2024-01-01 RX ADMIN — POTASSIUM CHLORIDE 20 MEQ: 750 TABLET, EXTENDED RELEASE ORAL at 05:28

## 2024-01-01 RX ADMIN — GABAPENTIN 300 MG: 300 CAPSULE ORAL at 20:54

## 2024-01-01 RX ADMIN — LISINOPRIL 5 MG: 5 TABLET ORAL at 09:48

## 2024-01-01 RX ADMIN — OXYCODONE HYDROCHLORIDE 10 MG: 5 TABLET ORAL at 14:42

## 2024-01-01 RX ADMIN — ATORVASTATIN CALCIUM 40 MG: 20 TABLET, FILM COATED ORAL at 20:54

## 2024-01-01 RX ADMIN — INSULIN LISPRO 2 UNITS: 100 INJECTION, SOLUTION INTRAVENOUS; SUBCUTANEOUS at 20:54

## 2024-01-01 RX ADMIN — ASPIRIN 81 MG: 81 TABLET, COATED ORAL at 09:48

## 2024-01-01 RX ADMIN — INSULIN LISPRO 2 UNITS: 100 INJECTION, SOLUTION INTRAVENOUS; SUBCUTANEOUS at 17:18

## 2024-01-01 RX ADMIN — OXYCODONE HYDROCHLORIDE 10 MG: 5 TABLET ORAL at 09:49

## 2024-01-01 RX ADMIN — ENOXAPARIN SODIUM 40 MG: 100 INJECTION SUBCUTANEOUS at 17:18

## 2024-01-01 RX ADMIN — OXYCODONE HYDROCHLORIDE 10 MG: 5 TABLET ORAL at 05:28

## 2024-01-01 RX ADMIN — 0.12% CHLORHEXIDINE GLUCONATE 15 ML: 1.2 RINSE ORAL at 09:49

## 2024-01-01 RX ADMIN — MUPIROCIN 1 APPLICATION: 20 OINTMENT TOPICAL at 20:54

## 2024-01-01 RX ADMIN — SENNOSIDES AND DOCUSATE SODIUM 2 TABLET: 50; 8.6 TABLET ORAL at 20:54

## 2024-01-01 NOTE — SIGNIFICANT NOTE
01/01/24 1506   OTHER   Discipline physical therapist   Rehab Time/Intention   Session Not Performed patient/family declined treatment  (Attempted PT treatment session this date. Pt. declining ambulation this date, reports hes been up several times this date. PT will follow up next service date as appropriate. Encouraged to continue ambulating with nsg.)   Therapy Assessment/Plan (PT)   Criteria for Skilled Interventions Met (PT) yes   Recommendation   PT - Next Appointment 01/02/24

## 2024-01-01 NOTE — PLAN OF CARE
Goal Outcome Evaluation:   Pt alert and oriented and follows commands. Pain meds given every 4 hrs as per pt request. Voided well and had BM today. DC chest tubes, and epi wires today, pt tolerated procedure well ambulated with 1 asst plan of care in progress

## 2024-01-01 NOTE — PROGRESS NOTES
He slept much better in the recliner.  His chest x-ray looks clear with no pneumothorax on waterseal and the PA and lateral.  If he has no airleak then we can discontinue his tubes.  I am looking at probable Wednesday discharge.

## 2024-01-01 NOTE — PLAN OF CARE
Goal Outcome Evaluation:  Plan of Care Reviewed With: patient, spouse        Progress: no change  Outcome Evaluation: A/O, pain control issues ongoing, asking for meds every 4 hours, refusing to walk with nursing this AM, still having difficulty coughing up secretions, wife at bedside, VSS.

## 2024-01-02 LAB
ANION GAP SERPL CALCULATED.3IONS-SCNC: 13.5 MMOL/L (ref 5–15)
BUN SERPL-MCNC: 17 MG/DL (ref 6–20)
BUN/CREAT SERPL: 19.1 (ref 7–25)
CALCIUM SPEC-SCNC: 8.8 MG/DL (ref 8.6–10.5)
CHLORIDE SERPL-SCNC: 98 MMOL/L (ref 98–107)
CO2 SERPL-SCNC: 22.5 MMOL/L (ref 22–29)
CREAT SERPL-MCNC: 0.89 MG/DL (ref 0.76–1.27)
DEPRECATED RDW RBC AUTO: 46.9 FL (ref 37–54)
EGFRCR SERPLBLD CKD-EPI 2021: 105.1 ML/MIN/1.73
ERYTHROCYTE [DISTWIDTH] IN BLOOD BY AUTOMATED COUNT: 14.6 % (ref 12.3–15.4)
GLUCOSE BLDC GLUCOMTR-MCNC: 142 MG/DL (ref 70–130)
GLUCOSE BLDC GLUCOMTR-MCNC: 143 MG/DL (ref 70–130)
GLUCOSE BLDC GLUCOMTR-MCNC: 146 MG/DL (ref 70–130)
GLUCOSE BLDC GLUCOMTR-MCNC: 175 MG/DL (ref 70–130)
GLUCOSE SERPL-MCNC: 133 MG/DL (ref 65–99)
HCT VFR BLD AUTO: 40.1 % (ref 37.5–51)
HGB BLD-MCNC: 13.8 G/DL (ref 13–17.7)
MCH RBC QN AUTO: 30.3 PG (ref 26.6–33)
MCHC RBC AUTO-ENTMCNC: 34.4 G/DL (ref 31.5–35.7)
MCV RBC AUTO: 88.1 FL (ref 79–97)
PLATELET # BLD AUTO: 258 10*3/MM3 (ref 140–450)
PMV BLD AUTO: 9.8 FL (ref 6–12)
POTASSIUM SERPL-SCNC: 3.7 MMOL/L (ref 3.5–5.2)
POTASSIUM SERPL-SCNC: 4.2 MMOL/L (ref 3.5–5.2)
RBC # BLD AUTO: 4.55 10*6/MM3 (ref 4.14–5.8)
SODIUM SERPL-SCNC: 134 MMOL/L (ref 136–145)
WBC NRBC COR # BLD AUTO: 10.08 10*3/MM3 (ref 3.4–10.8)

## 2024-01-02 PROCEDURE — 63710000001 INSULIN LISPRO (HUMAN) PER 5 UNITS: Performed by: NURSE PRACTITIONER

## 2024-01-02 PROCEDURE — 85027 COMPLETE CBC AUTOMATED: CPT | Performed by: NURSE PRACTITIONER

## 2024-01-02 PROCEDURE — 84132 ASSAY OF SERUM POTASSIUM: CPT | Performed by: THORACIC SURGERY (CARDIOTHORACIC VASCULAR SURGERY)

## 2024-01-02 PROCEDURE — 80048 BASIC METABOLIC PNL TOTAL CA: CPT | Performed by: NURSE PRACTITIONER

## 2024-01-02 PROCEDURE — 94762 N-INVAS EAR/PLS OXIMTRY CONT: CPT

## 2024-01-02 PROCEDURE — 94799 UNLISTED PULMONARY SVC/PX: CPT

## 2024-01-02 PROCEDURE — 97110 THERAPEUTIC EXERCISES: CPT

## 2024-01-02 PROCEDURE — 82948 REAGENT STRIP/BLOOD GLUCOSE: CPT

## 2024-01-02 PROCEDURE — 25010000002 ENOXAPARIN PER 10 MG: Performed by: NURSE PRACTITIONER

## 2024-01-02 PROCEDURE — 25010000002 MAGNESIUM SULFATE 2 GM/50ML SOLUTION: Performed by: NURSE PRACTITIONER

## 2024-01-02 RX ORDER — POTASSIUM CHLORIDE 750 MG/1
20 TABLET, FILM COATED, EXTENDED RELEASE ORAL ONCE
Status: COMPLETED | OUTPATIENT
Start: 2024-01-02 | End: 2024-01-02

## 2024-01-02 RX ORDER — FUROSEMIDE 40 MG/1
40 TABLET ORAL ONCE
Status: COMPLETED | OUTPATIENT
Start: 2024-01-02 | End: 2024-01-02

## 2024-01-02 RX ORDER — GUAIFENESIN 600 MG/1
1200 TABLET, EXTENDED RELEASE ORAL EVERY 12 HOURS SCHEDULED
Status: DISCONTINUED | OUTPATIENT
Start: 2024-01-02 | End: 2024-01-03 | Stop reason: HOSPADM

## 2024-01-02 RX ORDER — MAGNESIUM SULFATE HEPTAHYDRATE 40 MG/ML
2 INJECTION, SOLUTION INTRAVENOUS ONCE
Status: COMPLETED | OUTPATIENT
Start: 2024-01-02 | End: 2024-01-02

## 2024-01-02 RX ADMIN — ATORVASTATIN CALCIUM 40 MG: 20 TABLET, FILM COATED ORAL at 20:32

## 2024-01-02 RX ADMIN — SENNOSIDES AND DOCUSATE SODIUM 2 TABLET: 50; 8.6 TABLET ORAL at 20:32

## 2024-01-02 RX ADMIN — INSULIN LISPRO 2 UNITS: 100 INJECTION, SOLUTION INTRAVENOUS; SUBCUTANEOUS at 11:21

## 2024-01-02 RX ADMIN — GABAPENTIN 300 MG: 300 CAPSULE ORAL at 21:29

## 2024-01-02 RX ADMIN — GABAPENTIN 300 MG: 300 CAPSULE ORAL at 14:45

## 2024-01-02 RX ADMIN — ASPIRIN 81 MG: 81 TABLET, COATED ORAL at 08:43

## 2024-01-02 RX ADMIN — POTASSIUM CHLORIDE 20 MEQ: 750 TABLET, EXTENDED RELEASE ORAL at 06:01

## 2024-01-02 RX ADMIN — GUAIFENESIN 1200 MG: 600 TABLET, EXTENDED RELEASE ORAL at 20:31

## 2024-01-02 RX ADMIN — METOPROLOL TARTRATE 50 MG: 50 TABLET, FILM COATED ORAL at 20:32

## 2024-01-02 RX ADMIN — HYDROCODONE BITARTRATE AND ACETAMINOPHEN 2 TABLET: 5; 325 TABLET ORAL at 10:35

## 2024-01-02 RX ADMIN — CYCLOBENZAPRINE 10 MG: 10 TABLET, FILM COATED ORAL at 08:46

## 2024-01-02 RX ADMIN — LIDOCAINE 2 PATCH: 4 PATCH TOPICAL at 08:46

## 2024-01-02 RX ADMIN — MAGNESIUM SULFATE HEPTAHYDRATE 2 G: 40 INJECTION, SOLUTION INTRAVENOUS at 08:45

## 2024-01-02 RX ADMIN — FUROSEMIDE 40 MG: 40 TABLET ORAL at 17:24

## 2024-01-02 RX ADMIN — LISINOPRIL 5 MG: 5 TABLET ORAL at 08:43

## 2024-01-02 RX ADMIN — METOPROLOL TARTRATE 50 MG: 50 TABLET, FILM COATED ORAL at 08:43

## 2024-01-02 RX ADMIN — OXYCODONE HYDROCHLORIDE 10 MG: 5 TABLET ORAL at 20:32

## 2024-01-02 RX ADMIN — OXYCODONE HYDROCHLORIDE 10 MG: 5 TABLET ORAL at 00:57

## 2024-01-02 RX ADMIN — ENOXAPARIN SODIUM 40 MG: 100 INJECTION SUBCUTANEOUS at 17:24

## 2024-01-02 RX ADMIN — CYCLOBENZAPRINE 10 MG: 10 TABLET, FILM COATED ORAL at 20:32

## 2024-01-02 RX ADMIN — HYDROCODONE BITARTRATE AND ACETAMINOPHEN 2 TABLET: 5; 325 TABLET ORAL at 14:41

## 2024-01-02 RX ADMIN — GABAPENTIN 300 MG: 300 CAPSULE ORAL at 06:01

## 2024-01-02 RX ADMIN — PANTOPRAZOLE SODIUM 40 MG: 40 TABLET, DELAYED RELEASE ORAL at 06:01

## 2024-01-02 RX ADMIN — OXYCODONE HYDROCHLORIDE 10 MG: 5 TABLET ORAL at 06:01

## 2024-01-02 RX ADMIN — GUAIFENESIN 1200 MG: 600 TABLET, EXTENDED RELEASE ORAL at 08:42

## 2024-01-02 RX ADMIN — POTASSIUM CHLORIDE 20 MEQ: 750 TABLET, EXTENDED RELEASE ORAL at 08:48

## 2024-01-02 RX ADMIN — MUPIROCIN 1 APPLICATION: 20 OINTMENT TOPICAL at 20:32

## 2024-01-02 RX ADMIN — POTASSIUM CHLORIDE 20 MEQ: 750 TABLET, EXTENDED RELEASE ORAL at 17:24

## 2024-01-02 RX ADMIN — FUROSEMIDE 40 MG: 40 TABLET ORAL at 08:42

## 2024-01-02 NOTE — THERAPY TREATMENT NOTE
Patient Name: Kristian Chandra  : 1975    MRN: [id number]                              Today's Date: 2024       Admit Date: 2023    Visit Dx:     ICD-10-CM ICD-9-CM   1. S/P CABG (coronary artery bypass graft)  Z95.1 V45.81     Patient Active Problem List   Diagnosis    Chest pain    CAD (coronary artery disease)    Tobacco dependence    Dyslipidemia     Past Medical History:   Diagnosis Date    Abnormal liver function tests     elevated    Acute bronchitis     Asthma, allergic     Blastomycosis     Chronic obstructive lung disease     Chronic persistent hepatitis     Coronary arteriosclerosis     post stent     Diabetes mellitus     Diarrhea     Dyspnea on exertion     Epigastric pain     Essential hypertension     Generalized abdominal pain     GERD (gastroesophageal reflux disease)     with esophagitis    H/O echocardiogram     Normal left ventricular systolic function with EF of 65%. Concentric left ventricular hypertrophy. 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 Postdilated using a noncompliant balloon 2.01z01vo balloon 2011        CARDIAC CATHETERIZATION N/A 2017    Procedure: Left Heart Cath;  Surgeon: Melissa Tineo MD;  Location: [facility] INVASIVE LOCATION;  Service:     COLONOSCOPY      External and internal hemorrhoids.Medium sized lipoma at the ileocecal valve. Biopsied.Several biopsies obtained in the entire colon and in the terminal ileum. 2016        CORONARY ARTERY BYPASS GRAFT N/A  12/29/2023    Procedure: STERNOTOMY, CORONARY ARTERY BYPASS WITH INTERNAL MAMMARY ARTERY GRAFT X 7, UTILIZING THE LEFT JESSE AND ENDOSCOPICALLY HARVESTED LEFT SAPHENOUS VEIN, LEFT ATRIAL APPENDAGE CLOSURE, TRANSESOPHAGEAL ECHOCARDIOGRAM WITH ANESTHESIA, PRP;  Surgeon: Jr Jimmy Delgado MD;  Location: [facility];  Service: Cardiothoracic;  Laterality: N/A;    CORONARY STENT PLACEMENT  2017    ENDOSCOPY      Internal & external hemorrhoids found. 12/21/2011      ENDOSCOPY      Mildly severe esophagitis. Gastritis. Normal duodenum. Biopsied. Several biopsies obtained in the lower third of the esophagus.Several biopsies obtained in the gastric antrum. 07/08/2016        UT RT/LT HEART CATHETERS N/A 06/16/2017    Procedure: Percutaneous Coronary Intervention;  Surgeon: Melissa Tineo MD;  Location: [facility] INVASIVE LOCATION;  Service: Cardiovascular      General Information       Row Name 01/02/24 1027          Physical Therapy Time and Intention    Document Type therapy note (daily note)  -PC     Mode of Treatment physical therapy  -PC       Row Name 01/02/24 1027          General Information    Existing Precautions/Restrictions cardiac;sternal  -PC       Row Name 01/02/24 1027          Cognition    Orientation Status (Cognition) oriented x 4  -PC               User Key  (r) = Recorded By, (t) = Taken By, (c) = Cosigned By      Initials Name Provider Type    PC Magalis Saravia PT Physical Therapist                   Mobility       Row Name 01/02/24 1027          Bed Mobility    Comment, (Bed Mobility) up in chair  -PC       Row Name 01/02/24 1027          Sit-Stand Transfer    Sit-Stand [place] (Transfers) standby assist  -PC       Row Name 01/02/24 1027          Gait/Stairs (Locomotion)    [place] Level (Gait) standby assist  -PC     Distance in Feet (Gait) 150 ft  -PC     Comment, (Gait/Stairs) gait pattern normalizing  -PC               User Key  (r) = Recorded By, (t) = Taken By, (c) = Cosigned By       Initials Name Provider Type    PC Magalis Saravia, PT Physical Therapist                   Obj/Interventions       Row Name 01/02/24 1029          Motor Skills    Therapeutic Exercise --  5 reps cardiac protocol  -PC               User Key  (r) = Recorded By, (t) = Taken By, (c) = Cosigned By      Initials Name Provider Type    PC Magalis Saravia, PT Physical Therapist                   Goals/Plan    No documentation.                  Clinical Impression       Row Name 01/02/24 1029          Pain    Pre/Posttreatment Pain Comment pt states he is working with medical staff on pain control, right now he is doing ok  -PC       Row Name 01/02/24 1029          Plan of Care Review    Plan of Care Reviewed With patient  -PC     Progress improving  -PC     Outcome Evaluation Pt showing progress with mobility, able to walk in [place] today, 150 ft with SBA, slow gait pattern and pt took 2-3 standing rests, overall showing good progress  -PC       Row Name 01/02/24 1029          Therapy Assessment/Plan (PT)    Therapy Frequency (PT) 6 times/wk  -PC       Row Name 01/02/24 1029          Positioning and Restraints    Pre-Treatment Position sitting in chair/recliner  -PC     Post Treatment Position bathroom  -PC     Bathroom sitting;call light within reach;encouraged to call for assist;with family/caregiver  -PC               User Key  (r) = Recorded By, (t) = Taken By, (c) = Cosigned By      Initials Name Provider Type    PC Magalis Saravia, PT Physical Therapist                   Outcome Measures       Row Name 01/02/24 1031 01/02/24 0843       How much help from another person do you currently need...    Turning from your back to your side while in flat bed without using bedrails? 3  -PC 4 (P)   -MB    Moving from lying on back to sitting on the side of a flat bed without bedrails? 3  -PC 4 (P)   -MB    Moving to and from a bed to a chair (including a wheelchair)? 3  -PC 3 (P)   -MB    Standing up from a chair using your  arms (e.g., wheelchair, bedside chair)? 3  -PC 3 (P)   -MB    Climbing 3-5 steps with a railing? 3  -PC 3 (P)   -MB    To walk in hospital room? 3  -PC 3 (P)   -MB    AM-PAC 6 Clicks Score (PT) 18  -PC 20 (P)   -MB    Highest Level of Mobility Goal 6 --> Walk 10 steps or more  -PC 6 --> Walk 10 steps or more (P)   -MB      Row Name 01/01/24 2300          How much help from another person do you currently need...    Turning from your back to your side while in flat bed without using bedrails? 3  -RR     Moving from lying on back to sitting on the side of a flat bed without bedrails? 3  -RR     Moving to and from a bed to a chair (including a wheelchair)? 3  -RR     Standing up from a chair using your arms (e.g., wheelchair, bedside chair)? 3  -RR     Climbing 3-5 steps with a railing? 2  -RR     To walk in hospital room? 3  -RR     AM-PAC 6 Clicks Score (PT) 17  -RR     Highest Level of Mobility Goal 5 --> Static standing  -RR               User Key  (r) = Recorded By, (t) = Taken By, (c) = Cosigned By      Initials Name Provider Type    Magalis Matias, PT Physical Therapist    RR Chris Coffman, RN Registered Nurse    Akila Dotson RN Extern Registered Nurse                                 Physical Therapy Education       Title: PT OT SLP Therapies (Done)       Topic: Physical Therapy (Done)       Point: Mobility training (Done)       Learning Progress Summary             Patient Acceptance, E,D, DU by PC at 1/2/2024 1031    Acceptance, E, VU by ER at 12/31/2023 1445    Acceptance, E, VU by ER at 12/30/2023 1323                         Point: Home exercise program (Done)       Learning Progress Summary             Patient Acceptance, E,D, DU by PC at 1/2/2024 1031    Acceptance, E, VU by ER at 12/31/2023 1445    Acceptance, E, VU by ER at 12/30/2023 1323                         Point: Body mechanics (Done)       Learning Progress Summary             Patient Acceptance, E,D, DU by  at 1/2/2024 1031     Acceptance, E, VU by ER at 12/31/2023 1445    Acceptance, E, VU by ER at 12/30/2023 1323                         Point: Precautions (Done)       Learning Progress Summary             Patient Acceptance, E,D, DU by PC at 1/2/2024 1031    Acceptance, E, VU by ER at 12/31/2023 1445    Acceptance, E, VU by ER at 12/30/2023 1323                                         User Key       Initials Effective Dates Name Provider Type Discipline    PC 06/16/21 -  Magalis Saravia, PT Physical Therapist PT    ER 10/15/23 -  Naomi Rodriguez PT Physical Therapist PT                  PT Recommendation and Plan     Plan of Care Reviewed With: patient  Progress: improving  Outcome Evaluation: Pt showing progress with mobility, able to walk in [place] today, 150 ft with SBA, slow gait pattern and pt took 2-3 standing rests, overall showing good progress     Time Calculation:         PT Charges       Row Name 01/02/24 1032             Time Calculation    Start Time 0953  -PC      Stop Time 1003  -PC      Time Calculation (min) 10 min  -PC      PT Received On 01/02/24  -PC      PT - Next Appointment 01/03/24  -PC                User Key  (r) = Recorded By, (t) = Taken By, (c) = Cosigned By      Initials Name Provider Type    PC Magalis Saravia, PT Physical Therapist                  Therapy Charges for Today       Code Description Service Date Service Provider Modifiers Qty    39484275219 HC PT THER PROC EA 15 MIN 1/2/2024 Magalis Saravia, PT GP 1            PT G-Codes  Outcome Measure Options: AM-PAC 6 Clicks Basic Mobility (PT)  AM-PAC 6 Clicks Score (PT): 18  PT Discharge Summary  Anticipated Discharge Disposition (PT): home with assist    Magalis Saravia, PT  1/2/2024

## 2024-01-02 NOTE — CASE MANAGEMENT/SOCIAL WORK
Discharge Planning Assessment  [facility]     Patient Name: Kristian Chandra  MRN: [id number]  Today's Date: 1/2/2024    Admit Date: 12/28/2023    Plan: Home with family support & [facility].   Discharge Needs Assessment       Row Name 01/02/24 1629       Living Environment    People in Home spouse    Name(s) of People in Home Wife/Lori.    Current Living Arrangements home    Primary Care Provided by self    Provides Primary Care For no one    Family Caregiver if Needed spouse    Quality of Family Relationships helpful;involved;supportive    Able to Return to Prior Arrangements yes       Resource/Environmental Concerns    Transportation Concerns none       Transition Planning    Patient/Family Anticipates Transition to home with family;home with help/services    Patient/Family Anticipated Services at Transition     Transportation Anticipated family or friend will provide       Discharge Needs Assessment    Readmission Within the Last 30 Days no previous admission in last 30 days    Equipment Currently Used at Home glucometer    Discharge Facility/Level of Care Needs home with home health    Provided Post Acute Provider List? N/A    N/A Provider List Comment Declined; requests [facility].    Patient's Choice of Community Agency(s) [facility].                   Discharge Plan       Row Name 01/02/24 1630       Plan    Plan Home with family support & [facility].    Patient/Family in Agreement with Plan yes    Plan Comments Spoke with the patient and his wife/Lori, verified current information and explained the role of the CCP. Patient lives with Lori and has family support. He's IADL and owns a glucometer. He has no history with RH/HH. Patient plans to d/c home with family support and HH. Both he and Lori request a referral to [facility]. Referral sent in [employer]. Spoke with León [facility] ([phone]) and faxed the referral to the fax number provided by her  ([phone]; fax delivery receipt received). CCP will follow.                  Continued Care and Services - Admitted Since 12/28/2023       Home Medical Care       Service Provider Request Status Selected Services Address Phone Fax Patient Preferred    [facility] Pending - Request Sent N/A [address] [facility] [address] [phone] --                  Expected Discharge Date and Time       Expected Discharge Date Expected Discharge Time    Javier 3, 2024            Demographic Summary       Row Name 01/02/24 1628       General Information    Admission Type inpatient    Reason for Consult discharge planning    Preferred Language English       Contact Information    Permission Granted to Share Info With ;family/designee                   Functional Status       Row Name 01/02/24 1629       Functional Status    Usual Activity Tolerance good       Functional Status, IADL    Medications independent    Meal Preparation independent    Housekeeping independent    Laundry independent    Shopping independent       Mental Status    General Appearance WDL WDL       Mental Status Summary    Recent Changes in Mental Status/Cognitive Functioning no changes                   Psychosocial       Row Name 01/02/24 1629       Intellectual Performance WDL    Level of Consciousness Alert       Coping/Stress    Patient Personal Strengths able to adapt    Sources of Support spouse    Reaction to Health Status accepting    Understanding of Condition and Treatment adequate understanding of medical condition;adequate understanding of treatment       Developmental Stage (Eriksson's)    Developmental Stage Stage 7 (35-65 years/Middle Adulthood) Generativity vs. Stagnation                    Nikki HONG RN

## 2024-01-02 NOTE — DISCHARGE PLACEMENT REQUEST
"Kristian Chandra (48 y.o. Male)       Date of Birth   1975    Social Security Number       Address   [address] [place] [address]    Home Phone   [phone]    MRN   [id number]       Religious   Patient Refused    Marital Status                               Admission Date   12/28/23    Admission Type   Urgent    Admitting Provider   Jr Jimmy Delgado MD    Attending Provider   Jr Jimmy Delgado MD    Department, Room/Bed   [facility] CARDIOVASC UNIT, 2210/1       Discharge Date       Discharge Disposition       Discharge Destination                                 Attending Provider: Jr Jimmy Delgado MD    Allergies: Wound Dressing Adhesive    Isolation: None   Infection: None   Code Status: CPR    Ht: 177.8 cm (70\")   Wt: 111 kg (244 lb 1.6 oz)    Admission Cmt: None   Principal Problem: CAD (coronary artery disease) [I25.10]                   Active Insurance as of 12/28/2023       Primary Coverage       Payor Plan Insurance Group Employer/Plan Group    [facility] COMMUNITY PLAN [facility] COMMUNITY PLAN [facility]       Payor Plan Address Payor Plan Phone Number Payor Plan Fax Number Effective Dates    [address]   1/1/2023 - None Entered    [facility] [address]         Subscriber Name Subscriber Birth Date Member ID       KRISTIAN CHANDRA 1975 [id number]                     Emergency Contacts        (Rel.) Home Phone Work Phone Mobile Phone    NIKKOROSAS (Spouse) [phone] -- [phone]    AdeleRosalinda dumont (Mother) [phone] -- --              "

## 2024-01-02 NOTE — PROGRESS NOTES
" LOS: 5 days   Patient Care Team:  Bonita Wong FNP as PCP - General (Nurse Practitioner)    Chief Complaint: post op    Subjective      Vital Signs  Temp:  [97.7 °F (36.5 °C)-98.3 °F (36.8 °C)] 98.1 °F (36.7 °C)  Heart Rate:  [] 107  Resp:  [18] 18  BP: ()/(67-82) 125/77  Body mass index is 34.88 kg/m².    Intake/Output Summary (Last 24 hours) at 1/2/2024 0743  Last data filed at 1/2/2024 0600  Gross per 24 hour   Intake 480 ml   Output 350 ml   Net 130 ml     No intake/output data recorded.    Chest tube drainage last 8 hours         12/29/23  0500 12/31/23  0507 01/01/24  0629   Weight: 109 kg (241 lb 2.9 oz) 113 kg (248 lb 11.2 oz) 110 kg (243 lb 1.6 oz)         Objective:  Vital signs: (most recent): Blood pressure 104/77, pulse 111, temperature 98.1 °F (36.7 °C), temperature source Oral, resp. rate 18, height 177.8 cm (70\"), weight 111 kg (244 lb 1.6 oz), SpO2 94%.                Results Review:        WBC WBC   Date Value Ref Range Status   01/02/2024 10.08 3.40 - 10.80 10*3/mm3 Final   01/01/2024 11.74 (H) 3.40 - 10.80 10*3/mm3 Final   12/31/2023 12.12 (H) 3.40 - 10.80 10*3/mm3 Final      HGB Hemoglobin   Date Value Ref Range Status   01/02/2024 13.8 13.0 - 17.7 g/dL Final   01/01/2024 13.7 13.0 - 17.7 g/dL Final   12/31/2023 13.7 13.0 - 17.7 g/dL Final      HCT Hematocrit   Date Value Ref Range Status   01/02/2024 40.1 37.5 - 51.0 % Final   01/01/2024 40.2 37.5 - 51.0 % Final   12/31/2023 40.2 37.5 - 51.0 % Final      Platelets Platelets   Date Value Ref Range Status   01/02/2024 258 140 - 450 10*3/mm3 Final   01/01/2024 252 140 - 450 10*3/mm3 Final   12/31/2023 212 140 - 450 10*3/mm3 Final        PT/INR:  No results found for: \"PROTIME\"/No results found for: \"INR\"    Sodium Sodium   Date Value Ref Range Status   01/02/2024 134 (L) 136 - 145 mmol/L Final   01/01/2024 132 (L) 136 - 145 mmol/L Final   12/31/2023 138 136 - 145 mmol/L Final      Potassium Potassium   Date Value Ref Range " "Status   01/02/2024 3.7 3.5 - 5.2 mmol/L Final     Comment:     Slight hemolysis detected by analyzer. Result may be falsely elevated.   01/01/2024 3.9 3.5 - 5.2 mmol/L Final   12/31/2023 4.0 3.5 - 5.2 mmol/L Final      Chloride Chloride   Date Value Ref Range Status   01/02/2024 98 98 - 107 mmol/L Final   01/01/2024 101 98 - 107 mmol/L Final   12/31/2023 104 98 - 107 mmol/L Final      Bicarbonate CO2   Date Value Ref Range Status   01/02/2024 22.5 22.0 - 29.0 mmol/L Final   01/01/2024 21.0 (L) 22.0 - 29.0 mmol/L Final   12/31/2023 23.0 22.0 - 29.0 mmol/L Final      BUN BUN   Date Value Ref Range Status   01/02/2024 17 6 - 20 mg/dL Final   01/01/2024 16 6 - 20 mg/dL Final   12/31/2023 10 6 - 20 mg/dL Final      Creatinine Creatinine   Date Value Ref Range Status   01/02/2024 0.89 0.76 - 1.27 mg/dL Final   01/01/2024 0.87 0.76 - 1.27 mg/dL Final   12/31/2023 0.91 0.76 - 1.27 mg/dL Final      Calcium Calcium   Date Value Ref Range Status   01/02/2024 8.8 8.6 - 10.5 mg/dL Final   01/01/2024 8.9 8.6 - 10.5 mg/dL Final   12/31/2023 8.5 (L) 8.6 - 10.5 mg/dL Final      Magnesium No results found for: \"MG\"       aspirin, 81 mg, Oral, Daily  atorvastatin, 40 mg, Oral, Nightly  chlorhexidine, 15 mL, Mouth/Throat, Q12H  enoxaparin, 40 mg, Subcutaneous, Q24H  furosemide, 40 mg, Oral, Daily  gabapentin, 300 mg, Oral, Q8H  insulin lispro, 2-9 Units, Subcutaneous, 4x Daily AC & at Bedtime  Lidocaine, 2 patch, Transdermal, Q24H  lisinopril, 5 mg, Oral, Q24H  metoprolol tartrate, 50 mg, Oral, Q12H  mupirocin, , Each Nare, BID  pantoprazole, 40 mg, Oral, QAM  potassium chloride ER, 20 mEq, Oral, Daily  senna-docusate sodium, 2 tablet, Oral, Nightly                 CAD (coronary artery disease)    Tobacco dependence    Dyslipidemia      Assessment & Plan    -Multivessel CAD; s/p CABG x7, with atriclip POD#4 [place]  -Hypertension  -Hyperlipidemia  -Obesity -- BMI 34.61  -Newly diagnosed DM -- pt reports new diagnosis last week, A1C ia " 8.2        Patient sitting up in chair, looks good this morning  2L NC-- wean able  Remains in sinus -- he is a little tachy this morning-- has not received his beta blocker yet-- will see how he does after -- will give 2g of mag as well-- replete potassium  Encourage pulmonary toilet    Increase activity/mobilization  Will give an additional PO dose of lasix this afternoon  Will check an overnight tonight  May be ready for discharge home with home health tomorrow             FLORI Nobles  01/02/24  07:43 EST

## 2024-01-02 NOTE — PLAN OF CARE
Goal Outcome Evaluation:  Plan of Care Reviewed With: patient        Progress: improving  Outcome Evaluation: Pt showing progress with mobility, able to walk in [place] today, 150 ft with SBA, slow gait pattern and pt took 2-3 standing rests, overall showing good progress      Anticipated Discharge Disposition (PT): home with assist

## 2024-01-02 NOTE — PLAN OF CARE
Goal Outcome Evaluation:  Plan of Care Reviewed With: (P) patient        Progress: (P) improving  Outcome Evaluation: (P) Pt is SR on tele. HR is 90-100s. -120s. Room air. Pt ambulation is improving, walks as SBA with nursing staff. Pain being managed per MAR. Plan is to do overnight pulse ox tonight. Pt has no further complaints as of the present.

## 2024-01-02 NOTE — PLAN OF CARE
Goal Outcome Evaluation:  Plan of Care Reviewed With: patient, spouse        Progress: no change  Outcome Evaluation: A/O, requiring pain meds with longer interval between doses, up with assist X1, possible DC on Wednesday, VSS.

## 2024-01-03 ENCOUNTER — APPOINTMENT (OUTPATIENT)
Dept: GENERAL RADIOLOGY | Facility: HOSPITAL | Age: 49
End: 2024-01-03
Payer: MEDICAID

## 2024-01-03 VITALS
SYSTOLIC BLOOD PRESSURE: 109 MMHG | RESPIRATION RATE: 18 BRPM | HEART RATE: 88 BPM | DIASTOLIC BLOOD PRESSURE: 68 MMHG | TEMPERATURE: 98.6 F | WEIGHT: 242.6 LBS | OXYGEN SATURATION: 95 % | BODY MASS INDEX: 34.73 KG/M2 | HEIGHT: 70 IN

## 2024-01-03 LAB
ANION GAP SERPL CALCULATED.3IONS-SCNC: 10.1 MMOL/L (ref 5–15)
BUN SERPL-MCNC: 16 MG/DL (ref 6–20)
BUN/CREAT SERPL: 18.4 (ref 7–25)
CALCIUM SPEC-SCNC: 8.4 MG/DL (ref 8.6–10.5)
CHLORIDE SERPL-SCNC: 101 MMOL/L (ref 98–107)
CO2 SERPL-SCNC: 24.9 MMOL/L (ref 22–29)
CREAT SERPL-MCNC: 0.87 MG/DL (ref 0.76–1.27)
EGFRCR SERPLBLD CKD-EPI 2021: 105.8 ML/MIN/1.73
GLUCOSE BLDC GLUCOMTR-MCNC: 111 MG/DL (ref 70–130)
GLUCOSE BLDC GLUCOMTR-MCNC: 131 MG/DL (ref 70–130)
GLUCOSE SERPL-MCNC: 133 MG/DL (ref 65–99)
POTASSIUM SERPL-SCNC: 3.9 MMOL/L (ref 3.5–5.2)
POTASSIUM SERPL-SCNC: 4.1 MMOL/L (ref 3.5–5.2)
SODIUM SERPL-SCNC: 136 MMOL/L (ref 136–145)

## 2024-01-03 PROCEDURE — 94799 UNLISTED PULMONARY SVC/PX: CPT

## 2024-01-03 PROCEDURE — 84132 ASSAY OF SERUM POTASSIUM: CPT | Performed by: THORACIC SURGERY (CARDIOTHORACIC VASCULAR SURGERY)

## 2024-01-03 PROCEDURE — 71045 X-RAY EXAM CHEST 1 VIEW: CPT

## 2024-01-03 PROCEDURE — 80048 BASIC METABOLIC PNL TOTAL CA: CPT | Performed by: NURSE PRACTITIONER

## 2024-01-03 PROCEDURE — 82948 REAGENT STRIP/BLOOD GLUCOSE: CPT

## 2024-01-03 RX ORDER — ASPIRIN 81 MG/1
81 TABLET ORAL DAILY
Qty: 30 TABLET | Refills: 2 | Status: SHIPPED | OUTPATIENT
Start: 2024-01-03 | End: 2024-01-03 | Stop reason: SDUPTHER

## 2024-01-03 RX ORDER — METOPROLOL TARTRATE 50 MG/1
50 TABLET, FILM COATED ORAL EVERY 12 HOURS SCHEDULED
Qty: 60 TABLET | Refills: 2 | Status: CANCELLED | OUTPATIENT
Start: 2024-01-03 | End: 2024-04-02

## 2024-01-03 RX ORDER — POTASSIUM CHLORIDE 20 MEQ/1
20 TABLET, EXTENDED RELEASE ORAL DAILY
Qty: 30 TABLET | Refills: 0 | Status: SHIPPED | OUTPATIENT
Start: 2024-01-03 | End: 2024-01-03 | Stop reason: SDUPTHER

## 2024-01-03 RX ORDER — GABAPENTIN 300 MG/1
300 CAPSULE ORAL EVERY 8 HOURS SCHEDULED
Qty: 21 CAPSULE | Refills: 0 | Status: SHIPPED | OUTPATIENT
Start: 2024-01-03 | End: 2024-01-03 | Stop reason: SDUPTHER

## 2024-01-03 RX ORDER — CYCLOBENZAPRINE HCL 5 MG
5 TABLET ORAL EVERY 8 HOURS PRN
Qty: 30 TABLET | Refills: 0 | Status: SHIPPED | OUTPATIENT
Start: 2024-01-03

## 2024-01-03 RX ORDER — CYCLOBENZAPRINE HCL 5 MG
5 TABLET ORAL EVERY 8 HOURS PRN
Qty: 30 TABLET | Refills: 0 | Status: SHIPPED | OUTPATIENT
Start: 2024-01-03 | End: 2024-01-03 | Stop reason: SDUPTHER

## 2024-01-03 RX ORDER — ATENOLOL 25 MG/1
25 TABLET ORAL EVERY 12 HOURS SCHEDULED
Qty: 60 TABLET | Refills: 2 | Status: SHIPPED | OUTPATIENT
Start: 2024-01-03 | End: 2024-04-02

## 2024-01-03 RX ORDER — OXYCODONE HYDROCHLORIDE 10 MG/1
10 TABLET ORAL EVERY 4 HOURS PRN
Qty: 60 TABLET | Refills: 0 | Status: SHIPPED | OUTPATIENT
Start: 2024-01-03 | End: 2024-01-13

## 2024-01-03 RX ORDER — GABAPENTIN 300 MG/1
300 CAPSULE ORAL EVERY 8 HOURS SCHEDULED
Qty: 21 CAPSULE | Refills: 0 | Status: SHIPPED | OUTPATIENT
Start: 2024-01-03 | End: 2024-01-10

## 2024-01-03 RX ORDER — FUROSEMIDE 40 MG/1
40 TABLET ORAL DAILY
Qty: 30 TABLET | Refills: 0 | Status: SHIPPED | OUTPATIENT
Start: 2024-01-03 | End: 2024-02-02

## 2024-01-03 RX ORDER — POTASSIUM CHLORIDE 750 MG/1
20 TABLET, FILM COATED, EXTENDED RELEASE ORAL ONCE
Status: COMPLETED | OUTPATIENT
Start: 2024-01-03 | End: 2024-01-03

## 2024-01-03 RX ORDER — FUROSEMIDE 40 MG/1
40 TABLET ORAL DAILY
Qty: 30 TABLET | Refills: 0 | Status: SHIPPED | OUTPATIENT
Start: 2024-01-03 | End: 2024-01-03 | Stop reason: SDUPTHER

## 2024-01-03 RX ORDER — ATENOLOL 25 MG/1
25 TABLET ORAL EVERY 12 HOURS SCHEDULED
Status: DISCONTINUED | OUTPATIENT
Start: 2024-01-03 | End: 2024-01-03 | Stop reason: HOSPADM

## 2024-01-03 RX ORDER — POTASSIUM CHLORIDE 20 MEQ/1
20 TABLET, EXTENDED RELEASE ORAL DAILY
Qty: 30 TABLET | Refills: 0 | Status: SHIPPED | OUTPATIENT
Start: 2024-01-03 | End: 2024-02-02

## 2024-01-03 RX ORDER — ASPIRIN 81 MG/1
81 TABLET ORAL DAILY
Qty: 30 TABLET | Refills: 2 | Status: SHIPPED | OUTPATIENT
Start: 2024-01-03 | End: 2024-04-02

## 2024-01-03 RX ORDER — ATENOLOL 25 MG/1
25 TABLET ORAL EVERY 12 HOURS SCHEDULED
Qty: 60 TABLET | Refills: 2 | Status: SHIPPED | OUTPATIENT
Start: 2024-01-03 | End: 2024-01-03 | Stop reason: SDUPTHER

## 2024-01-03 RX ORDER — OXYCODONE HYDROCHLORIDE 10 MG/1
10 TABLET ORAL EVERY 4 HOURS PRN
Qty: 42 TABLET | Refills: 0 | Status: SHIPPED | OUTPATIENT
Start: 2024-01-03 | End: 2024-01-03 | Stop reason: SDUPTHER

## 2024-01-03 RX ADMIN — METFORMIN HYDROCHLORIDE 500 MG: 500 TABLET, FILM COATED ORAL at 11:31

## 2024-01-03 RX ADMIN — POTASSIUM CHLORIDE 20 MEQ: 750 TABLET, EXTENDED RELEASE ORAL at 06:47

## 2024-01-03 RX ADMIN — LIDOCAINE 2 PATCH: 4 PATCH TOPICAL at 09:18

## 2024-01-03 RX ADMIN — OXYCODONE HYDROCHLORIDE 10 MG: 5 TABLET ORAL at 03:50

## 2024-01-03 RX ADMIN — FUROSEMIDE 40 MG: 40 TABLET ORAL at 09:18

## 2024-01-03 RX ADMIN — ATENOLOL 25 MG: 25 TABLET ORAL at 11:31

## 2024-01-03 RX ADMIN — LISINOPRIL 5 MG: 5 TABLET ORAL at 09:18

## 2024-01-03 RX ADMIN — GABAPENTIN 300 MG: 300 CAPSULE ORAL at 06:09

## 2024-01-03 RX ADMIN — POTASSIUM CHLORIDE 20 MEQ: 750 TABLET, EXTENDED RELEASE ORAL at 09:18

## 2024-01-03 RX ADMIN — PANTOPRAZOLE SODIUM 40 MG: 40 TABLET, DELAYED RELEASE ORAL at 06:09

## 2024-01-03 RX ADMIN — ACETAMINOPHEN 650 MG: 325 TABLET, FILM COATED ORAL at 11:35

## 2024-01-03 RX ADMIN — CYCLOBENZAPRINE 10 MG: 10 TABLET, FILM COATED ORAL at 11:35

## 2024-01-03 RX ADMIN — ASPIRIN 81 MG: 81 TABLET, COATED ORAL at 09:18

## 2024-01-03 RX ADMIN — OXYCODONE HYDROCHLORIDE 10 MG: 5 TABLET ORAL at 13:47

## 2024-01-03 NOTE — CONSULTS
"Diabetes Education  Assessment/Teaching    Patient Name:  Kristian Chandra  YOB: 1975  MRN: [id number]  Admit Date:  12/28/2023      Assessment Date:  1/3/2024  Flowsheet Row Most Recent Value   General Information     Referral From: A1c, Database  [A1C 8.2%]   Height 177.8 cm (70\")   Height Method Stated   Weight 110 kg (242 lb 9.6 oz)   Weight Method Standing scale   How would you rate your current health? fair   Patient expressed need pt drives a truck,so sedentary lifestyle   Pregnancy Assessment    Diabetes History    What type of diabetes do you have? Type 2   Length of Diabetes Diagnosis Newly diagnosed <6 months   Current DM knowledge poor   Have you had diabetes education/teaching in the past? yes   When and where was your diabetes education? couple weeks ago   Do you test your blood sugar at home? yes   Frequency of checks couple times day   Have you had low blood sugar? (<70mg/dl) no   Have you had high blood sugar? (>140mg/dl) no   How would you rate your diabetes control? 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 heart disease   Education Preferences    What areas of diabetes would you like to learn about? avoiding high blood sugar, medications for diabetes, testing my blood sugar at home, diet information   Nutrition Information    Assessment Topics    Healthy Eating - Assessment Needs education   Being Active - Assessment Needs education   Taking Medication - Assessment Needs education   Problem Solving - Assessment Needs education   Reducing Risk - Assessment Needs education   Healthy Coping - Assessment Needs education   Monitoring - Assessment Needs education   DM Goals    Healthy Eating - Goal 0-7 days from discharge   Being Active - Goal 0-7 days from discharge   Taking Medication - Goal 0-7 days from discharge   Problem Solving - Goal 0-7 days from discharge   Monitoring - Goal " 0-7 days from discharge            Flowsheet Row Most Recent Value   DM Education Needs    Meter Has own   Frequency of Testing Daily   Medication Oral  [pt tp start on metformin]   Physical Activity Walking   Physical Activity Frequency Discussed exercise importance   Healthy Coping Appropriate   Discharge Plan Home, Follow-up with MD   Motivation Moderate   Teaching Method Explanation, Discussion, Teach back   Patient Response Verbalized understanding, Needs reinforcement, Other (comment)  [wife at visit]              Other Comments:  pt newly diagnosed in last few weeks. States he has been making diet adjustments. States he has a meter and will use that to test a couple times day. Pt has a PCP he can report to. We discussed several possible oral agents that they may be prescribing for him. Wife is an RN,so she is good resource. No written material given because they had gotten lots recently.        Electronically signed by:  Daisy Machado RN  01/03/24 11:26 EST

## 2024-01-03 NOTE — DISCHARGE SUMMARY
Date of Admission:   Date of Discharge:  1/3/2024    Discharge Diagnosis:   -Multivessel CAD; s/p CABG x7, with atriclip [place]  -Hypertension  -Hyperlipidemia  -Obesity -- BMI 34.61  -Newly diagnosed DM -- pt reports new diagnosis last week, A1C ia 8.2    Presenting Problem/History of Present Illness  CAD (coronary artery disease) [I25.10]     Hospital Course  Patient is a 48 y.o. male presented with for cardiac surgery.  On 12/29 he underwent a CABG x 7.  Skeletonized LIMA x 2 to diagonal branch and then LAD.  Sequential vein graft to RV marginal branch PDA and then OM 3.  Vein graft to D1.  Vein graft to OM1.  Atrial clip ligation of the left atrial appendage.  Left posterolateral pericardial window for drainage.  Temporary cardiopulmonary bypass.  Antegrade and retrograde cold blood cardioplegia with warm reperfusion.  Neurologic monitoring.  Endoscopic vein harvest of the left greater saphenous vein with Saphena by Dr. Delgado (see op report for full details).  Post operatively he did well. Weaned from ventilator on the day of surgery.  Tolerated medication therapy.  He was newly diagnosed with diabetes and needed a prior auth for janumet which was prescribed by his PCP,  he will be sent home with metformin and follow up with his PCP.  All lines tubes and wires removed without issue.  Weaned from supplemental oxygen. Adequate PO intake.  Urinating and defecating normally. Met PT goals.  On post operative day 5 he was deemed ready for discharge home with home health.  Follow up appointments as below.  F/u with Dr. Dickey on 2/14 at 1:45pm. Patient was provided with appropriate discharge education. He was instructed to call office with any questions or concerns.      Procedures Performed  Procedure(s):  STERNOTOMY, CORONARY ARTERY BYPASS WITH INTERNAL MAMMARY ARTERY GRAFT X 7, UTILIZING THE LEFT JESSE AND ENDOSCOPICALLY HARVESTED LEFT SAPHENOUS VEIN, LEFT ATRIAL APPENDAGE CLOSURE, TRANSESOPHAGEAL ECHOCARDIOGRAM  WITH ANESTHESIA, PRP       Consults:   Consults       No orders found from 11/29/2023 to 12/29/2023.            Pertinent Test Results:    Lab Results   Component Value Date    WBC 10.08 01/02/2024    HGB 13.8 01/02/2024    HCT 40.1 01/02/2024    MCV 88.1 01/02/2024     01/02/2024      Lab Results   Component Value Date    GLUCOSE 133 (H) 01/03/2024    CALCIUM 8.4 (L) 01/03/2024     01/03/2024    K 3.9 01/03/2024    CO2 24.9 01/03/2024     01/03/2024    BUN 16 01/03/2024    CREATININE 0.87 01/03/2024    EGFRIFNONA 89 06/17/2017    BCR 18.4 01/03/2024    ANIONGAP 10.1 01/03/2024     Lab Results   Component Value Date    INR 1.22 (H) 12/30/2023    PROTIME 15.6 (H) 12/30/2023         Condition on Discharge:  good    Vital Signs  Temp:  [97.4 °F (36.3 °C)-98.7 °F (37.1 °C)] 98.6 °F (37 °C)  Heart Rate:  [] 111  Resp:  [17-18] 18  BP: (105-120)/(65-87) 105/87      Discharge Disposition  Home-Health Care [facility]    Discharge Medications     Discharge Medications        New Medications        Instructions Start Date   aspirin 81 MG EC tablet  Replaces: Aspirin 325 MG capsule   81 mg, Oral, Daily      atenolol 25 MG tablet  Commonly known as: TENORMIN   Take 1 tablet by mouth Every 12 (Twelve) Hours      cyclobenzaprine 5 MG tablet  Commonly known as: FLEXERIL   5 mg, Oral, Every 8 Hours PRN      furosemide 40 MG tablet  Commonly known as: LASIX   40 mg, Oral, Daily      gabapentin 300 MG capsule  Commonly known as: NEURONTIN   300 mg, Oral, Every 8 Hours Scheduled      metFORMIN 500 MG tablet  Commonly known as: GLUCOPHAGE   500 mg, Oral, 2 Times Daily With Meals      oxyCODONE 10 MG tablet  Commonly known as: ROXICODONE   10 mg, Oral, Every 4 Hours PRN      potassium chloride 20 MEQ CR tablet  Commonly known as: K-DUR,KLOR-CON   20 mEq, Oral, Daily             Continue These Medications        Instructions Start Date   atorvastatin 40 MG tablet  Commonly known as: LIPITOR   40 mg, Oral, Nightly       esomeprazole 40 MG capsule  Commonly known as: nexIUM   40 mg, Oral      freestyle lancets   3 TIMES A DAY      FREESTYLE LITE test strip  Generic drug: glucose blood   USE 1 STRIP 4 TIMES DAILY      lisinopril 5 MG tablet  Commonly known as: PRINIVIL,ZESTRIL   5 mg, Oral, Daily      loratadine 10 MG tablet  Commonly known as: CLARITIN   10 mg, Oral      Trelegy Ellipta 200-62.5-25 MCG/ACT aerosol powder   Generic drug: Fluticasone-Umeclidin-Vilant   Inhalation             Stop These Medications      Aspirin 325 MG capsule  Replaced by: aspirin 81 MG EC tablet     nitroglycerin 0.4 MG SL tablet  Commonly known as: NITROSTAT              Discharge Diet:     Activity at Discharge:   1. No driving for 2 weeks and off narcotic pain medications.  2. Shower daily. Clean incisions with warm water and antibacterial soap only. Do not put any lotion or ointments on incisions.  3. Ambulate for 10 minutes at least 3 times a day.  4. No heavy lifting > 10lbs until seen in office.   5. Take all medications as prescribed.       Follow-up Appointments  Future Appointments   Date Time Provider Department Center   1/31/2024 11:15 AM Anitra Perez APRN [facility]     [unfilled]    Test Results Pending at Discharge       FLORI Nobles  01/03/24  10:09 EST

## 2024-01-03 NOTE — PLAN OF CARE
Goal Outcome Evaluation:  Plan of Care Reviewed With: patient, spouse        Progress: no change  Outcome Evaluation: A/O, still requesting oxycontin for pain, up with assist, incision BERNIE, overnight 02 study completed, possible dc today, VSS.

## 2024-01-03 NOTE — PROGRESS NOTES
" LOS: 6 days   Patient Care Team:  Bonita Wong FNP as PCP - General (Nurse Practitioner)    Chief Complaint: post op    Subjective      Vital Signs  Temp:  [97.4 °F (36.3 °C)-98.7 °F (37.1 °C)] 98.7 °F (37.1 °C)  Heart Rate:  [] 107  Resp:  [17-18] 18  BP: (104-120)/(65-77) 111/74  Body mass index is 34.81 kg/m².    Intake/Output Summary (Last 24 hours) at 1/3/2024 0818  Last data filed at 1/3/2024 0347  Gross per 24 hour   Intake 1080 ml   Output 500 ml   Net 580 ml     No intake/output data recorded.    Chest tube drainage last 8 hours         01/01/24  0629 01/02/24  0552 01/03/24  0346   Weight: 110 kg (243 lb 1.6 oz) 111 kg (244 lb 1.6 oz) 110 kg (242 lb 9.6 oz)         Objective:  Vital signs: (most recent): Blood pressure 105/87, pulse 111, temperature 98.6 °F (37 °C), temperature source Oral, resp. rate 18, height 177.8 cm (70\"), weight 110 kg (242 lb 9.6 oz), SpO2 98%.                Results Review:        WBC WBC   Date Value Ref Range Status   01/02/2024 10.08 3.40 - 10.80 10*3/mm3 Final   01/01/2024 11.74 (H) 3.40 - 10.80 10*3/mm3 Final      HGB Hemoglobin   Date Value Ref Range Status   01/02/2024 13.8 13.0 - 17.7 g/dL Final   01/01/2024 13.7 13.0 - 17.7 g/dL Final      HCT Hematocrit   Date Value Ref Range Status   01/02/2024 40.1 37.5 - 51.0 % Final   01/01/2024 40.2 37.5 - 51.0 % Final      Platelets Platelets   Date Value Ref Range Status   01/02/2024 258 140 - 450 10*3/mm3 Final   01/01/2024 252 140 - 450 10*3/mm3 Final        PT/INR:  No results found for: \"PROTIME\"/No results found for: \"INR\"    Sodium Sodium   Date Value Ref Range Status   01/03/2024 136 136 - 145 mmol/L Final   01/02/2024 134 (L) 136 - 145 mmol/L Final   01/01/2024 132 (L) 136 - 145 mmol/L Final      Potassium Potassium   Date Value Ref Range Status   01/03/2024 3.9 3.5 - 5.2 mmol/L Final   01/02/2024 4.2 3.5 - 5.2 mmol/L Final     Comment:     Slight hemolysis detected by analyzer. Result may be falsely elevated. " "  01/02/2024 3.7 3.5 - 5.2 mmol/L Final     Comment:     Slight hemolysis detected by analyzer. Result may be falsely elevated.   01/01/2024 3.9 3.5 - 5.2 mmol/L Final      Chloride Chloride   Date Value Ref Range Status   01/03/2024 101 98 - 107 mmol/L Final   01/02/2024 98 98 - 107 mmol/L Final   01/01/2024 101 98 - 107 mmol/L Final      Bicarbonate CO2   Date Value Ref Range Status   01/03/2024 24.9 22.0 - 29.0 mmol/L Final   01/02/2024 22.5 22.0 - 29.0 mmol/L Final   01/01/2024 21.0 (L) 22.0 - 29.0 mmol/L Final      BUN BUN   Date Value Ref Range Status   01/03/2024 16 6 - 20 mg/dL Final   01/02/2024 17 6 - 20 mg/dL Final   01/01/2024 16 6 - 20 mg/dL Final      Creatinine Creatinine   Date Value Ref Range Status   01/03/2024 0.87 0.76 - 1.27 mg/dL Final   01/02/2024 0.89 0.76 - 1.27 mg/dL Final   01/01/2024 0.87 0.76 - 1.27 mg/dL Final      Calcium Calcium   Date Value Ref Range Status   01/03/2024 8.4 (L) 8.6 - 10.5 mg/dL Final   01/02/2024 8.8 8.6 - 10.5 mg/dL Final   01/01/2024 8.9 8.6 - 10.5 mg/dL Final      Magnesium No results found for: \"MG\"       aspirin, 81 mg, Oral, Daily  atorvastatin, 40 mg, Oral, Nightly  chlorhexidine, 15 mL, Mouth/Throat, Q12H  enoxaparin, 40 mg, Subcutaneous, Q24H  furosemide, 40 mg, Oral, Daily  gabapentin, 300 mg, Oral, Q8H  guaiFENesin, 1,200 mg, Oral, Q12H  insulin lispro, 2-9 Units, Subcutaneous, 4x Daily AC & at Bedtime  Lidocaine, 2 patch, Transdermal, Q24H  lisinopril, 5 mg, Oral, Q24H  metoprolol tartrate, 50 mg, Oral, Q12H  mupirocin, , Each Nare, BID  pantoprazole, 40 mg, Oral, QAM  potassium chloride ER, 20 mEq, Oral, Daily  senna-docusate sodium, 2 tablet, Oral, Nightly                 CAD (coronary artery disease)    Tobacco dependence    Dyslipidemia      Assessment & Plan    -Multivessel CAD; s/p CABG x7, with atriclip POD#5 [place]  -Hypertension  -Hyperlipidemia  -Obesity -- BMI 34.61  -Newly diagnosed DM -- pt reports new diagnosis last week, A1C ia 8.2      "   Patient sitting up in chair, looks good this morning  On room air-- tolerating  Remains in sinus -- still tachy-- will switch him to atenolol as his blood pressure would not tolerate increase in beta blocker  Encourage pulmonary toilet    Increase activity/mobilization  No need for nocturnal oxygen at home  Newly diagnosed diabetes-- will start a small dose of metformin for the morning  Probable home with home health later this afternoon      FLORI Nobles  01/03/24  08:18 EST

## 2024-01-03 NOTE — PROGRESS NOTES
[facility] Clinical Pharmacy Services: Patient Counseling Consult    Kristian Chandra and family have been counseled on the following medications: aspirin, atenolol, cyclobenzaprine, furosemide, gabapentin, metformin, oxycodone, and potassium. Counseling points included the following:    Explained indication of each medication, and patient's need for these medications.  Went over dosing and frequency of each medication.  Discussed any special administration, storage, or monitoring instructions with medications.  Discussed all important drug interactions, including over-the-counter medications and supplements.  Explained possible side effects for each medication.  Instructed the patient not to begin or discontinue any medications without informing his/her physician/pharmacist.  Talked about keeping a week-long blood pressure journal to bring to her next appointment with the nurse practitioner. Spoke to the importance of taking blood pressure the same time every day.     Patient expressed understanding and had no further questions.      Dior Mendosa, Pharm.D., [facility]   Clinical Pharmacist  Phone Extension #[phone]

## 2024-01-04 ENCOUNTER — READMISSION MANAGEMENT (OUTPATIENT)
Dept: CALL CENTER | Facility: HOSPITAL | Age: 49
End: 2024-01-04
Payer: MEDICAID

## 2024-01-04 NOTE — CASE MANAGEMENT/SOCIAL WORK
Case Management Discharge Note      Final Note: Pt discharged home and no home health was secured prior to d/c.  CCP called and spoke with [facility] and they cannot accept due to payer source.  CCP called Pt spouse/Lori Coleman to discuss.  Lori advised she is an RN and that she can assess Pt at home.........Darleen ELAINE/GILBERT    Provided Post Acute Provider List?: N/A  N/A Provider List Comment: Declined; requests [facility].    Selected Continued Care - Discharged on 1/3/2024 Admission date: 12/28/2023 - Discharge disposition: Home-Health Care Svc      Destination    No services have been selected for the patient.                Durable Medical Equipment    No services have been selected for the patient.                Dialysis/Infusion    No services have been selected for the patient.                Home Medical Care    No services have been selected for the patient.                Therapy    No services have been selected for the patient.                Community Resources    No services have been selected for the patient.                Community & DME    No services have been selected for the patient.                         Final Discharge Disposition Code: 01 - home or self-care

## 2024-01-04 NOTE — OUTREACH NOTE
Prep Survey      Flowsheet Row Responses   [facility] [facility] patient discharged from? [place]   Is LACE score < 7 ? No   Eligibility Readm Mgmt   Discharge diagnosis CAD, CABG X7   Does the patient have one of the following disease processes/diagnoses(primary or secondary)? Cardiothoracic surgery   Does the patient have Home health ordered? Yes   What is the Home health agency?  [facility]   Is there a DME ordered? No   Medication alerts for this patient see avs   Prep survey completed? Yes            Odalys SAENZ - Registered Nurse

## 2024-01-10 ENCOUNTER — READMISSION MANAGEMENT (OUTPATIENT)
Dept: CALL CENTER | Facility: HOSPITAL | Age: 49
End: 2024-01-10
Payer: MEDICAID

## 2024-01-10 NOTE — OUTREACH NOTE
CT Surgery Week 1 Survey      Flowsheet Row Responses   [facility] patient discharged from? [place]   Does the patient have one of the following disease processes/diagnoses(primary or secondary)? Cardiothoracic surgery   Week 1 attempt successful? Yes   Call start time 0945   Call end time 0953   Discharge diagnosis CAD, CABG X7   Person spoke with today (if not patient) and relationship Patient   Meds reviewed with patient/caregiver? Yes   Does the patient have all medications related to this admission filled (includes all antibiotics, pain medications, cardiac medications, etc.) Yes   Is the patient taking all medications as directed (includes completed medication regime)? Yes  [patient reports that he took the metformin a couple of times, but is controlling blood sugar with his diet.]   Does the patient have a primary care provider?  Yes   Does the patient have an appointment scheduled with their C/T surgeon? Yes   Comments regarding PCP Patient has PCP appt today   Has the patient kept scheduled appointments due by today? N/A   Has home health visited the patient within 72 hours of discharge? N/A   Psychosocial issues? No   Did the patient receive a copy of their discharge instructions? Yes   Nursing interventions Reviewed instructions with patient   What is the patient's perception of their health status since discharge? Improving   Is the patient /caregiver able to teach back basic post-op care? Continue use of incentive spirometry at least 1 week post discharge, Practice cough and deep breath every 4 hours while awake, Drive as instructed by MD in discharge instructions, No tub bath, swimming, or hot tub until instructed by MD, Use a clean wash cloth and antibacterial bar or liquid soap to clean incisions, Lifting as instructed by MD in discharge instructions   Is the patient/caregiver able to teach back signs and symptoms of incisional infection? Increased redness, swelling or pain at the incisonal  site, Increased drainage or bleeding, Pus or odor from incision, Fever   Is the patient/caregiver able to teach back steps to recovery at home? Eat a well-balance diet, Rest and rebuild strength, gradually increase activity, Set small, achievable goals for return to baseline health   Is the patient /caregiver able to teach back the importance of cardiac rehab? Yes  [Patient reports that his wife is RN and used to work in cardiac rehab.]   If the patient is a current smoker, are they able to teach back resources for cessation? Not a smoker   Is the patient/caregiver able to teach back the hierarchy of who to call/visit for symptoms/problems? PCP, Specialist, Home health nurse, Urgent Care, ED, 911 Yes   Week 1 call completed? Yes   Graduated Yes   Is the patient interested in additional calls from an ambulatory ? No   Would this patient benefit from a Referral to [facility] Social Work? No   Graduated/Revoked comments Patient reports that he is doing very well. He states that his wife is a nurse and denies any questions or needs today. No further calls needed.   Call end time [id number]              CASSIE OSWALD - Registered Nurse

## 2024-01-16 DIAGNOSIS — Z95.1 S/P CABG (CORONARY ARTERY BYPASS GRAFT): Primary | ICD-10-CM

## 2024-01-16 RX ORDER — OXYCODONE HYDROCHLORIDE 10 MG/1
10 TABLET ORAL EVERY 4 HOURS PRN
Qty: 18 TABLET | Refills: 0 | Status: SHIPPED | OUTPATIENT
Start: 2024-01-16

## 2024-01-31 ENCOUNTER — TELEMEDICINE (OUTPATIENT)
Dept: CARDIAC SURGERY | Facility: CLINIC | Age: 49
End: 2024-01-31
Payer: MEDICAID

## 2024-01-31 VITALS
HEART RATE: 67 BPM | BODY MASS INDEX: 34.07 KG/M2 | HEIGHT: 69 IN | RESPIRATION RATE: 18 BRPM | SYSTOLIC BLOOD PRESSURE: 110 MMHG | WEIGHT: 230 LBS | DIASTOLIC BLOOD PRESSURE: 60 MMHG

## 2024-01-31 DIAGNOSIS — Z95.1 S/P CABG (CORONARY ARTERY BYPASS GRAFT): Primary | ICD-10-CM

## 2024-01-31 PROCEDURE — 1159F MED LIST DOCD IN RCRD: CPT | Performed by: NURSE PRACTITIONER

## 2024-01-31 PROCEDURE — 99024 POSTOP FOLLOW-UP VISIT: CPT | Performed by: NURSE PRACTITIONER

## 2024-01-31 PROCEDURE — 1160F RVW MEDS BY RX/DR IN RCRD: CPT | Performed by: NURSE PRACTITIONER

## 2024-01-31 RX ORDER — CYCLOBENZAPRINE HCL 5 MG
5 TABLET ORAL EVERY 8 HOURS PRN
Qty: 15 TABLET | Refills: 0 | Status: SHIPPED | OUTPATIENT
Start: 2024-01-31

## 2024-01-31 NOTE — PROGRESS NOTES
"CARDIOVASCULAR SURGERY FOLLOW-UP PROGRESS NOTE    Chief Complaint: post op    HPI:   Dear Bonita Carlson, ARIANA and colleagues:    It was nice to see Kristian Chandra in follow up 4 weeks after surgery.  As you know, he is a 48 y.o. male with a history of CAD, hypertension, hyperlipidemia, and DM who underwent CABGx7 utilizing LIMA/LSVG and GRAYSON closure with Dr. Delgado on 12/29/23. He did well postoperatively and continues to do well. He comes in today complaining of incision soreness. He feels that the muscle relaxants help with this.  He can also use Tylenol as needed for discomfort. His activity level has been good.  From a surgical standpoint, the sternal incision is well approximated without erythema, edema, or drainage. The patient denies any popping or clicking with deep inspiration or coughing. He has not followed up with cardiology, but has an appointment coming up in 2 weeks with Dr. Dickey.    Physical Exam:         /60 (BP Location: Left arm, Patient Position: Sitting, Cuff Size: Adult)   Pulse 67   Resp 18   Ht 175.3 cm (69\")   Wt 104 kg (230 lb)   BMI 33.97 kg/m²   Heart: unable to assess  Lungs: unable to assess  Extremities:  no edema  Incision(s):  mid chest healing well, no significant drainage, and no dehiscence. left leg healing well, no significant drainage, no dehiscence, and no significant erythema. He does have a small amount of erythema around the left sided chest tube exit. There is no drainage    Assessment/Plan:     S/P CABG. Overall, he is doing well.    No significant post-op complications  Instructed to apply betadine to the chest tube exit site until well healed  He has no plans for cardiac rehab due to issues with insurance, but he reports that his activity level has been good    No heavy lifting > 10 pounds for 2 more weeks  Keep incisions clean and dry  OK to drive if not taking narcotic pain medicine  Follow-up as scheduled with cardiology  Follow-up as " scheduled with PCP  Follow-up with CT surgery prn  Okay to return to work at 8 weeks    Continue lifting restriction of 10 lbs until 6 weeks and 50 lbs until 12 weeks from the date of surgery, no excessive jarring motions or twisting motions until 12 weeks from the date of surgery    Return to clinic if any signs or symptoms of infection or sternal instability develop     Thank you for allowing me to participate in the care of your patient.    Regards,  Valentina Ambrocio, FLORI    **This patient has consented to a telehealth visit via [employer]. The visit was scheduled as a video visit due to transportation issues. All vitals recorded within this visit are reported by the patient. I spent 15 minutes in total including but not limited to the 8 minutes spent in direct conversation with this patient.

## 2024-04-02 RX ORDER — ATENOLOL 25 MG/1
25 TABLET ORAL EVERY 12 HOURS
Qty: 60 TABLET | Refills: 1 | OUTPATIENT
Start: 2024-04-02

## 2024-04-02 RX ORDER — ASPIRIN 81 MG/1
81 TABLET, COATED ORAL DAILY
Qty: 30 TABLET | Refills: 1 | OUTPATIENT
Start: 2024-04-02

## 2024-06-20 RX ORDER — ASPIRIN 81 MG/1
81 TABLET, COATED ORAL DAILY
Qty: 30 TABLET | Refills: 1 | OUTPATIENT
Start: 2024-06-20

## (undated) DEVICE — ARTERIAL NEEDLE: Brand: UNBRANDED

## (undated) DEVICE — Device: Brand: LEVEL 1

## (undated) DEVICE — 12 FOOT DISPOSABLE EXTENSION CABLE WITH SAFE CONNECT / SCREW-DOWN

## (undated) DEVICE — TOWEL,OR,DSP,ST,WHITE,DLX,4/PK,20PK/CS: Brand: MEDLINE

## (undated) DEVICE — NDL ANGIOGR ADV THN SMOTH SGLWALL 21G 1.5

## (undated) DEVICE — DRSNG WND GZ PAD BORDERED 4X8IN STRL

## (undated) DEVICE — CLAMP INSERT: Brand: STEALTH® CLAMP INSERT

## (undated) DEVICE — SUT SILK 0 CT1 CR8 18IN C021D

## (undated) DEVICE — SYR LUERLOK 20CC BX/50

## (undated) DEVICE — GUIDE SELECT ATRICLIP

## (undated) DEVICE — ADHS SKIN SURG TISS VISC PREMIERPRO EXOFIN HI/VISC FAST/DRY

## (undated) DEVICE — NEEDLE, QUINCKE, 20GX3.5": Brand: MEDLINE

## (undated) DEVICE — DRN WND CH RND FUL/FLUT NO/TROC 3/8IN 28F

## (undated) DEVICE — Device

## (undated) DEVICE — MODEL BT2000 P/N 700287-012KIT CONTENTS: MANIFOLD WITH SALINE AND CONTRAST PORTS, SALINE TUBING WITH SPIKE AND HAND SYRINGE, TRANSDUCER: Brand: BT2000 AUTOMATED MANIFOLD KIT

## (undated) DEVICE — ELECTRODE,RT,STRESS,FOAM,50PK: Brand: MEDLINE

## (undated) DEVICE — ST. SORBAVIEW ULTIMATE IJ SYSTEM A,C: Brand: CENTURION

## (undated) DEVICE — BLOWER/MISTER AXIOUS OPCAB W/TBG

## (undated) DEVICE — TR BAND RADIAL ARTERY COMPRESSION DEVICE: Brand: TR BAND

## (undated) DEVICE — DRSNG WND GEL FIBR OPTICELL AG PLS W/SLV LF 4X5IN  STRL

## (undated) DEVICE — INTRO SHEATH ART/FEM ENGAGE .038 6F12CM

## (undated) DEVICE — SOL IRR H2O BTL 1000ML STRL

## (undated) DEVICE — PK PERFUS CUST W/CARDIOPLEGIA

## (undated) DEVICE — BIOPATCH™ ANTIMICROBIAL DRESSING WITH CHLORHEXIDINE GLUCONATE IS A HYDROPHILLIC POLYURETHANE ABSORPTIVE FOAM WITH CHLORHEXIDINE GLUCONATE (CHG) WHICH INHIBITS BACTERIAL GROWTH UNDER THE DRESSING. THE DRESSING IS INTENDED TO BE USED TO ABSORB EXUDATE, COVER A WOUND CAUSED BY VASCULAR AND NONVASCULAR PERCUTANEOUS MEDICAL DEVICES DURING SURGERY, AS WELL AS REDUCE LOCAL INFECTION AND COLONIZATION OF MICROORGANISMS.: Brand: BIOPATCH

## (undated) DEVICE — HEMOCONCENTRATOR PERFUS LPS06

## (undated) DEVICE — SUT VIC 0 CT1 CR8 27IN JJ41G

## (undated) DEVICE — DEV INFL MONARCH 20ML

## (undated) DEVICE — LOU OPEN HEART DR POLLOCK: Brand: MEDLINE INDUSTRIES, INC.

## (undated) DEVICE — MODEL AT P65, P/N 701554-001KIT CONTENTS: HAND CONTROLLER, 3-WAY HIGH-PRESSURE STOPCOCK WITH ROTATING END AND PREMIUM HIGH-PRESSURE TUBING: Brand: ANGIOTOUCH® KIT

## (undated) DEVICE — 6F .070 XB LAD 3.5 100CM: Brand: VISTA BRITE TIP

## (undated) DEVICE — BG TRANSF W/COUPLER SPK 600ML

## (undated) DEVICE — OASIS DRAIN, SINGLE, INLINE & ATS COMPATIBLE: Brand: OASIS

## (undated) DEVICE — CVR PROB 96IN LF STRL

## (undated) DEVICE — SENSR CERBRL O2 PK/2

## (undated) DEVICE — GLIDESHEATH BASIC HYDROPHILIC COATED INTRODUCER SHEATH: Brand: GLIDESHEATH

## (undated) DEVICE — CATH DIAG EXPO M/ PK 6FR FL4/FR4 PIG 3PK

## (undated) DEVICE — PK SUT OPN HEART POLLOCK CUST

## (undated) DEVICE — ROTATING SURGICAL PUNCHES, 1 PER POUCH: Brand: A&E MEDICAL / ROTATING SURGICAL PUNCHES

## (undated) DEVICE — CANN VESL FREE FLO 2MM

## (undated) DEVICE — PK HEART OPN 40

## (undated) DEVICE — RUNTHROUGH NS EXTRA FLOPPY PTCA GUIDEWIRE: Brand: RUNTHROUGH

## (undated) DEVICE — A2000 MULTI-USE SYRINGE KIT, P/N 701277-003KIT CONTENTS: 100ML CONTRAST RESERVOIR AND TUBING WITH CONTRAST SPIKE AND CLAMP: Brand: A2000 MULTI-USE SYRINGE KIT

## (undated) DEVICE — DECANTER BAG 9": Brand: MEDLINE INDUSTRIES, INC.

## (undated) DEVICE — GW PERIPH GUIDERIGHT STD/J/TP PTFE/PCOAT SS 0.038IN 5X150CM

## (undated) DEVICE — LABEL SHEET CUSTOM 2X2 YELLOW: Brand: MEDLINE INDUSTRIES, INC.

## (undated) DEVICE — HARMONIC SYNERGY DISSECTING HOOK WITH TORQUE WRENCH. FOR USE WITH BLUE HAND PIECE ONLY: Brand: HARMONIC SYNERGY

## (undated) DEVICE — SPLNT WRST RAD 3560

## (undated) DEVICE — SYS VASOVIEW HEMOPRO ENDOSCOPIC HARVST VESL

## (undated) DEVICE — PK CATH LAB 60

## (undated) DEVICE — DRP SLUSH WARMR MACH CIR 44X44IN

## (undated) DEVICE — GLV SURG BIOGEL M LTX PF 7 1/2

## (undated) DEVICE — MEDICINE CUP, GRADUATED, STER: Brand: MEDLINE

## (undated) DEVICE — SYS PERFUS SEP PLATLT W TIPS CUST

## (undated) DEVICE — DRSNG SURESITE WNDW 2.38X2.75

## (undated) DEVICE — SOL ISO/ALC 70PCT 4OZ

## (undated) DEVICE — 32 FR RIGHT ANGLE – SOFT PVC CATHETER: Brand: PVC THORACIC CATHETERS

## (undated) DEVICE — TREK CORONARY DILATATION CATHETER 2.50 MM X 15 MM / RAPID-EXCHANGE: Brand: TREK

## (undated) DEVICE — TBG INSUFFLATION LUER LOCK: Brand: MEDLINE INDUSTRIES, INC.

## (undated) DEVICE — ST CVR PROB PULLUP ULTRASND 5X48IN

## (undated) DEVICE — COPILOT KIT INCLUDES BLEEDBACK CONTROL VALVE / GUIDE WIRE INTRODUCER / TORQUE DEVICE: Brand: ACCESSORIES

## (undated) DEVICE — SOL IRR NACL 0.9PCT BT 1000ML

## (undated) DEVICE — CANN ART EOPA 3D NV W/CONN 22F

## (undated) DEVICE — SUT PROLN MO.5 7/0 DBLARM BV175 6 2X30 BX/12

## (undated) DEVICE — CORONARY ARTERY BYPASS GRAFT MARKERS, STAINLESS STEEL, DISTAL, WITHOUT HOLDER: Brand: ANASTOMARK CORONARY ARTERY BYPASS GRAFT MARKERS, STAINLESS STEEL, DISTAL

## (undated) DEVICE — PK ATS CUST W CARDIOTOMY RESEVOIR